# Patient Record
Sex: FEMALE | Race: OTHER | Employment: PART TIME | URBAN - METROPOLITAN AREA
[De-identification: names, ages, dates, MRNs, and addresses within clinical notes are randomized per-mention and may not be internally consistent; named-entity substitution may affect disease eponyms.]

---

## 2017-03-10 ENCOUNTER — HOSPITAL ENCOUNTER (OUTPATIENT)
Age: 27
Discharge: HOME | End: 2017-03-10
Attending: EMERGENCY MEDICINE
Payer: COMMERCIAL

## 2017-03-10 VITALS
BODY MASS INDEX: 28.32 KG/M2 | OXYGEN SATURATION: 99 % | RESPIRATION RATE: 20 BRPM | HEIGHT: 61 IN | TEMPERATURE: 98 F | SYSTOLIC BLOOD PRESSURE: 115 MMHG | DIASTOLIC BLOOD PRESSURE: 76 MMHG | WEIGHT: 150 LBS | HEART RATE: 78 BPM

## 2017-03-10 DIAGNOSIS — J06.9 VIRAL URI: Primary | ICD-10-CM

## 2017-03-10 PROCEDURE — 99204 OFFICE O/P NEW MOD 45 MIN: CPT

## 2017-03-10 PROCEDURE — 99213 OFFICE O/P EST LOW 20 MIN: CPT

## 2017-03-10 RX ORDER — METHYLPREDNISOLONE 4 MG/1
TABLET ORAL
Qty: 1 PACKAGE | Refills: 0 | Status: SHIPPED | OUTPATIENT
Start: 2017-03-10 | End: 2017-08-21 | Stop reason: ALTCHOICE

## 2017-03-10 RX ORDER — BENZONATATE 200 MG/1
200 CAPSULE ORAL 3 TIMES DAILY PRN
Qty: 30 CAPSULE | Refills: 0 | Status: SHIPPED | OUTPATIENT
Start: 2017-03-10 | End: 2017-04-09

## 2017-03-10 RX ORDER — ALBUTEROL SULFATE 90 UG/1
2 AEROSOL, METERED RESPIRATORY (INHALATION) EVERY 4 HOURS PRN
Qty: 1 INHALER | Refills: 0 | Status: SHIPPED | OUTPATIENT
Start: 2017-03-10 | End: 2017-04-09

## 2017-03-10 RX ORDER — HYDROCODONE POLISTIREX AND CHLORPHENIRAMINE POLISTIREX 10; 8 MG/5ML; MG/5ML
5 SUSPENSION, EXTENDED RELEASE ORAL 2 TIMES DAILY PRN
Qty: 115 ML | Refills: 0 | Status: SHIPPED | OUTPATIENT
Start: 2017-03-10 | End: 2017-08-21 | Stop reason: ALTCHOICE

## 2017-03-11 NOTE — ED PROVIDER NOTES
Patient presents with:  Cough/URI    HPI:     Reyna Zhong is a 31year old female who presents with chief complaint of cough. Started 2-3 days ago with a scratchy throat. No sore throat. Has progressed to congestion and a cough. No CP or SOB.   Has PCP in 3-5 days for re-evaluation, sooner if symptoms worsen (fever, CP or SOB)      All results reviewed and discussed with patient. See AVS for detailed discharge instructions.

## 2017-03-11 NOTE — ED INITIAL ASSESSMENT (HPI)
Started w scratchy throat, Coughed up brown secretions once this AM.  Minimal relief w mucimex.   Chills on & off.

## 2017-08-17 ENCOUNTER — TELEPHONE (OUTPATIENT)
Dept: FAMILY MEDICINE CLINIC | Facility: CLINIC | Age: 27
End: 2017-08-17

## 2017-08-17 DIAGNOSIS — Z00.00 ANNUAL PHYSICAL EXAM: Primary | ICD-10-CM

## 2017-08-17 NOTE — TELEPHONE ENCOUNTER
Pt called she has a px with Dr Gonzalez Headley on Monday and would like to get her labs drawn before then. 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

## 2017-08-18 ENCOUNTER — LAB ENCOUNTER (OUTPATIENT)
Dept: LAB | Facility: HOSPITAL | Age: 27
End: 2017-08-18
Attending: FAMILY MEDICINE
Payer: COMMERCIAL

## 2017-08-18 DIAGNOSIS — Z00.00 ANNUAL PHYSICAL EXAM: ICD-10-CM

## 2017-08-18 LAB
25-HYDROXYVITAMIN D (TOTAL): 30.8 NG/ML (ref 30–100)
ALBUMIN SERPL-MCNC: 3.4 G/DL (ref 3.5–4.8)
ALP LIVER SERPL-CCNC: 84 U/L (ref 37–98)
ALT SERPL-CCNC: 23 U/L (ref 14–54)
AST SERPL-CCNC: 12 U/L (ref 15–41)
BASOPHILS # BLD AUTO: 0.05 X10(3) UL (ref 0–0.1)
BASOPHILS NFR BLD AUTO: 0.6 %
BILIRUB SERPL-MCNC: 0.4 MG/DL (ref 0.1–2)
BUN BLD-MCNC: 13 MG/DL (ref 8–20)
CALCIUM BLD-MCNC: 8.7 MG/DL (ref 8.3–10.3)
CHLORIDE: 107 MMOL/L (ref 101–111)
CHOLEST SMN-MCNC: 151 MG/DL (ref ?–200)
CO2: 25 MMOL/L (ref 22–32)
CREAT BLD-MCNC: 0.63 MG/DL (ref 0.55–1.02)
EOSINOPHIL # BLD AUTO: 0.21 X10(3) UL (ref 0–0.3)
EOSINOPHIL NFR BLD AUTO: 2.7 %
ERYTHROCYTE [DISTWIDTH] IN BLOOD BY AUTOMATED COUNT: 13.5 % (ref 11.5–16)
GLUCOSE BLD-MCNC: 87 MG/DL (ref 70–99)
HCT VFR BLD AUTO: 37.2 % (ref 34–50)
HDLC SERPL-MCNC: 50 MG/DL (ref 45–?)
HDLC SERPL: 3.02 {RATIO} (ref ?–4.44)
HGB BLD-MCNC: 12.2 G/DL (ref 12–16)
IMMATURE GRANULOCYTE COUNT: 0.01 X10(3) UL (ref 0–1)
IMMATURE GRANULOCYTE RATIO %: 0.1 %
LDLC SERPL CALC-MCNC: 83 MG/DL (ref ?–130)
LDLC SERPL-MCNC: 18 MG/DL (ref 5–40)
LYMPHOCYTES # BLD AUTO: 2.59 X10(3) UL (ref 0.9–4)
LYMPHOCYTES NFR BLD AUTO: 33.5 %
M PROTEIN MFR SERPL ELPH: 7 G/DL (ref 6.1–8.3)
MCH RBC QN AUTO: 27.5 PG (ref 27–33.2)
MCHC RBC AUTO-ENTMCNC: 32.8 G/DL (ref 31–37)
MCV RBC AUTO: 84 FL (ref 81–100)
MONOCYTES # BLD AUTO: 0.58 X10(3) UL (ref 0.1–0.6)
MONOCYTES NFR BLD AUTO: 7.5 %
NEUTROPHIL ABS PRELIM: 4.3 X10 (3) UL (ref 1.3–6.7)
NEUTROPHILS # BLD AUTO: 4.3 X10(3) UL (ref 1.3–6.7)
NEUTROPHILS NFR BLD AUTO: 55.6 %
NONHDLC SERPL-MCNC: 101 MG/DL (ref ?–130)
PLATELET # BLD AUTO: 240 10(3)UL (ref 150–450)
POTASSIUM SERPL-SCNC: 4.1 MMOL/L (ref 3.6–5.1)
RBC # BLD AUTO: 4.43 X10(6)UL (ref 3.8–5.1)
RED CELL DISTRIBUTION WIDTH-SD: 41.8 FL (ref 35.1–46.3)
SODIUM SERPL-SCNC: 140 MMOL/L (ref 136–144)
TRIGLYCERIDES: 88 MG/DL (ref ?–150)
TSI SER-ACNC: 1.23 MIU/ML (ref 0.35–5.5)
WBC # BLD AUTO: 7.7 X10(3) UL (ref 4–13)

## 2017-08-18 PROCEDURE — 80061 LIPID PANEL: CPT

## 2017-08-18 PROCEDURE — 84443 ASSAY THYROID STIM HORMONE: CPT

## 2017-08-18 PROCEDURE — 82306 VITAMIN D 25 HYDROXY: CPT

## 2017-08-18 PROCEDURE — 85025 COMPLETE CBC W/AUTO DIFF WBC: CPT

## 2017-08-18 PROCEDURE — 80053 COMPREHEN METABOLIC PANEL: CPT

## 2017-08-21 ENCOUNTER — OFFICE VISIT (OUTPATIENT)
Dept: FAMILY MEDICINE CLINIC | Facility: CLINIC | Age: 27
End: 2017-08-21

## 2017-08-21 VITALS
HEIGHT: 62 IN | TEMPERATURE: 99 F | WEIGHT: 173.19 LBS | DIASTOLIC BLOOD PRESSURE: 74 MMHG | RESPIRATION RATE: 14 BRPM | OXYGEN SATURATION: 98 % | SYSTOLIC BLOOD PRESSURE: 116 MMHG | HEART RATE: 96 BPM | BODY MASS INDEX: 31.87 KG/M2

## 2017-08-21 DIAGNOSIS — Z23 NEED FOR TDAP VACCINATION: ICD-10-CM

## 2017-08-21 DIAGNOSIS — Z00.00 ANNUAL PHYSICAL EXAM: Primary | ICD-10-CM

## 2017-08-21 PROCEDURE — 99395 PREV VISIT EST AGE 18-39: CPT | Performed by: FAMILY MEDICINE

## 2017-08-21 PROCEDURE — 90471 IMMUNIZATION ADMIN: CPT | Performed by: FAMILY MEDICINE

## 2017-08-21 PROCEDURE — 90715 TDAP VACCINE 7 YRS/> IM: CPT | Performed by: FAMILY MEDICINE

## 2017-08-21 NOTE — PROGRESS NOTES
HPI:   Pamela Bueno is a 28year old female who presents for a complete physical exam.   UTD on pap. Has gyne. Dr. Martinez Renee. No concerns today.       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 Children: no.   Exercise: has personal training, cardio and strenght   Diet: low carb.  Less sugars     REVIEW OF SYSTEMS:   GENERAL: feels well otherwise  SKIN: denies the plan. The patient is asked to return for CPX in 1 year.

## 2017-08-30 ENCOUNTER — OFFICE VISIT (OUTPATIENT)
Dept: OBGYN CLINIC | Facility: CLINIC | Age: 27
End: 2017-08-30

## 2017-08-30 VITALS — WEIGHT: 174 LBS | DIASTOLIC BLOOD PRESSURE: 68 MMHG | SYSTOLIC BLOOD PRESSURE: 94 MMHG | BODY MASS INDEX: 32 KG/M2

## 2017-08-30 DIAGNOSIS — Z30.011 ENCOUNTER FOR INITIAL PRESCRIPTION OF CONTRACEPTIVE PILLS: Primary | ICD-10-CM

## 2017-08-30 DIAGNOSIS — Z01.419 WELL WOMAN EXAM WITH ROUTINE GYNECOLOGICAL EXAM: ICD-10-CM

## 2017-08-30 DIAGNOSIS — Z12.4 ENCOUNTER FOR PAPANICOLAOU SMEAR FOR CERVICAL CANCER SCREENING: ICD-10-CM

## 2017-08-30 DIAGNOSIS — Z30.432 ENCOUNTER FOR IUD REMOVAL: ICD-10-CM

## 2017-08-30 PROCEDURE — 88175 CYTOPATH C/V AUTO FLUID REDO: CPT | Performed by: OBSTETRICS & GYNECOLOGY

## 2017-08-30 PROCEDURE — 99214 OFFICE O/P EST MOD 30 MIN: CPT | Performed by: OBSTETRICS & GYNECOLOGY

## 2017-08-30 RX ORDER — NORETHINDRONE ACETATE AND ETHINYL ESTRADIOL 1MG-20(21)
1 KIT ORAL DAILY
Qty: 3 PACKAGE | Refills: 3 | Status: SHIPPED | OUTPATIENT
Start: 2017-08-30 | End: 2017-11-28

## 2017-08-30 NOTE — PROGRESS NOTES
[facility]  Obstetrics and Gynecology  History & Physical    CC: Patient is here for annual well woman exam     Subjective:     HPI: Isrrael Mcnamara is a 28year old [place] [facility] female here for annual well women exam and IUD removal. The patient was N/A    PSH:  Past Surgical History:  2013: COLPOSCOPY, CERVIX W/UPPER ADJACENT VAGINA; W/*  2007: OTHER SURGICAL HISTORY      Comment: LEFT BREAST CYST REMOVED  2013: OTHER SURGICAL HISTORY      Comment: Lasik    Social History:    Social History  Social H skin changes, pain, lumps or discharge   Respiratory:  denies SOB, dyspnea, cough or wheezing  Cardiovascular:  denies chest pain, palpitations  GI: denies abdominal pain, diarrhea, constipation  :  denies dysuria, hematuria, increased urinary frequency. to IUD   Health maintenance  - encouraged to maintain weight at healthy BMI  - discussed importance of exercise and healthy eating    All of the findings and plan were discussed with the patient. She notes understanding and agrees with the plan of care.

## 2017-08-30 NOTE — PROCEDURES
Procedure Note: IUD removal     Preoperative Diagnosis:  IUD in place     Postoperative Diagnosis:  S/p IUD removal     Indications:  29 y/o [place] female with [facility] IUD in placed since 10/2015 who presents for IUD removal per patient request.     Procedu

## 2017-09-01 NOTE — PROGRESS NOTES
Please informed patient that pap smear is normal and routine screening recommended.  Repeat pap smear in 3 years and return in 1 year for an annual well woman exam.

## 2017-10-31 ENCOUNTER — HOSPITAL ENCOUNTER (OUTPATIENT)
Age: 27
Discharge: HOME | End: 2017-10-31
Attending: FAMILY MEDICINE
Payer: COMMERCIAL

## 2017-10-31 VITALS
DIASTOLIC BLOOD PRESSURE: 74 MMHG | TEMPERATURE: 98 F | HEART RATE: 71 BPM | WEIGHT: 173.94 LBS | RESPIRATION RATE: 16 BRPM | OXYGEN SATURATION: 100 % | SYSTOLIC BLOOD PRESSURE: 125 MMHG | BODY MASS INDEX: 32 KG/M2

## 2017-10-31 DIAGNOSIS — H65.02 ACUTE SEROUS OTITIS MEDIA OF LEFT EAR, RECURRENCE NOT SPECIFIED: Primary | ICD-10-CM

## 2017-10-31 PROCEDURE — 99214 OFFICE O/P EST MOD 30 MIN: CPT

## 2017-10-31 PROCEDURE — 99213 OFFICE O/P EST LOW 20 MIN: CPT

## 2017-10-31 RX ORDER — AMOXICILLIN 875 MG/1
875 TABLET, COATED ORAL EVERY 12 HOURS
Qty: 20 TABLET | Refills: 0 | Status: SHIPPED | OUTPATIENT
Start: 2017-10-31 | End: 2017-11-10

## 2017-10-31 NOTE — ED INITIAL ASSESSMENT (HPI)
Recent air flight felt left ear getting plugged up, no going to r no relief w OTC swimmer ear drops.

## 2017-11-01 NOTE — ED PROVIDER NOTES
Patient Seen in: [facility]    History   Patient presents with:  Ear Problem Pain (neurosensory): left ear plugged    Stated Complaint: Both ear problem x 3 days    HPI    24-year-old female presents for both ears plugged. agreed except as otherwise stated in HPI. Physical Exam   ED Triage Vitals [10/31/17 2714]  BP: 125/74  Pulse: 71  Resp: 16  Temp: 98.4 °F (36.9 °C)  Temp src: Temporal  SpO2: 100 %  O2 Device: None (Room air)    Current:/74   Pulse 71   Temp 98. 4 (primary encounter diagnosis)    Disposition:  Discharge    Follow-up:  Hola Polanco, [facility] 25     In 1 week  If symptoms worsen      Medications Prescribed:  Discharge Medication List as of 10/31/2017  6:09 PM

## 2017-12-12 ENCOUNTER — OFFICE VISIT (OUTPATIENT)
Dept: FAMILY MEDICINE CLINIC | Facility: CLINIC | Age: 27
End: 2017-12-12

## 2017-12-12 VITALS
OXYGEN SATURATION: 98 % | TEMPERATURE: 98 F | RESPIRATION RATE: 16 BRPM | HEART RATE: 83 BPM | SYSTOLIC BLOOD PRESSURE: 102 MMHG | DIASTOLIC BLOOD PRESSURE: 60 MMHG

## 2017-12-12 DIAGNOSIS — J01.10 ACUTE NON-RECURRENT FRONTAL SINUSITIS: Primary | ICD-10-CM

## 2017-12-12 DIAGNOSIS — R51.9 ACUTE NONINTRACTABLE HEADACHE, UNSPECIFIED HEADACHE TYPE: ICD-10-CM

## 2017-12-12 DIAGNOSIS — R05.9 COUGH: ICD-10-CM

## 2017-12-12 PROCEDURE — 99213 OFFICE O/P EST LOW 20 MIN: CPT | Performed by: PHYSICIAN ASSISTANT

## 2017-12-12 RX ORDER — FLUTICASONE PROPIONATE 50 MCG
2 SPRAY, SUSPENSION (ML) NASAL DAILY
Qty: 3 BOTTLE | Refills: 3 | Status: SHIPPED | OUTPATIENT
Start: 2017-12-12 | End: 2018-07-23 | Stop reason: ALTCHOICE

## 2017-12-12 RX ORDER — AMOXICILLIN AND CLAVULANATE POTASSIUM 875; 125 MG/1; MG/1
1 TABLET, FILM COATED ORAL 2 TIMES DAILY
Qty: 20 TABLET | Refills: 0 | Status: SHIPPED | OUTPATIENT
Start: 2017-12-12 | End: 2018-05-12 | Stop reason: ALTCHOICE

## 2017-12-12 RX ORDER — BENZONATATE 200 MG/1
200 CAPSULE ORAL 3 TIMES DAILY PRN
Qty: 30 CAPSULE | Refills: 0 | Status: SHIPPED | OUTPATIENT
Start: 2017-12-12 | End: 2018-05-12 | Stop reason: ALTCHOICE

## 2017-12-12 RX ORDER — ALBUTEROL SULFATE 90 UG/1
AEROSOL, METERED RESPIRATORY (INHALATION) EVERY 6 HOURS PRN
COMMUNITY
End: 2018-07-23 | Stop reason: ALTCHOICE

## 2017-12-12 NOTE — PROGRESS NOTES
CHIEF COMPLAINT:   Patient presents with:  URI: cough, congestion for past week and now getting worse.           HPI:   Lawerance Ryan is a 28year old female who presents for cough and congestion for past  Week but in the past few days cough has gotten wo 102/60   Pulse 83   Temp 97.9 °F (36.6 °C) (Oral)   Resp 16   LMP 11/28/2017   SpO2 98%   GENERAL: well developed, well nourished,in no apparent distress  SKIN: no rashes, no suspicious lesions  EYES: Conjunctiva clear. No scleral icterus.   HENT: Atraumat

## 2018-05-12 ENCOUNTER — OFFICE VISIT (OUTPATIENT)
Dept: FAMILY MEDICINE CLINIC | Facility: CLINIC | Age: 28
End: 2018-05-12

## 2018-05-12 VITALS
DIASTOLIC BLOOD PRESSURE: 62 MMHG | OXYGEN SATURATION: 97 % | HEIGHT: 63 IN | TEMPERATURE: 99 F | SYSTOLIC BLOOD PRESSURE: 102 MMHG | BODY MASS INDEX: 29.23 KG/M2 | RESPIRATION RATE: 16 BRPM | WEIGHT: 165 LBS | HEART RATE: 74 BPM

## 2018-05-12 DIAGNOSIS — Z00.00 ANNUAL PHYSICAL EXAM: Primary | ICD-10-CM

## 2018-05-12 DIAGNOSIS — N92.3 SPOTTING BETWEEN MENSES: ICD-10-CM

## 2018-05-12 DIAGNOSIS — Z30.41 ENCOUNTER FOR SURVEILLANCE OF CONTRACEPTIVE PILLS: ICD-10-CM

## 2018-05-12 DIAGNOSIS — Z11.3 SCREENING FOR STD (SEXUALLY TRANSMITTED DISEASE): ICD-10-CM

## 2018-05-12 PROCEDURE — 81025 URINE PREGNANCY TEST: CPT | Performed by: FAMILY MEDICINE

## 2018-05-12 PROCEDURE — 99395 PREV VISIT EST AGE 18-39: CPT | Performed by: FAMILY MEDICINE

## 2018-05-12 PROCEDURE — 87491 CHLMYD TRACH DNA AMP PROBE: CPT | Performed by: FAMILY MEDICINE

## 2018-05-12 PROCEDURE — 87591 N.GONORRHOEAE DNA AMP PROB: CPT | Performed by: FAMILY MEDICINE

## 2018-05-12 PROCEDURE — 87389 HIV-1 AG W/HIV-1&-2 AB AG IA: CPT | Performed by: FAMILY MEDICINE

## 2018-05-12 PROCEDURE — 36415 COLL VENOUS BLD VENIPUNCTURE: CPT | Performed by: FAMILY MEDICINE

## 2018-05-12 PROCEDURE — 80053 COMPREHEN METABOLIC PANEL: CPT | Performed by: FAMILY MEDICINE

## 2018-05-12 PROCEDURE — 85025 COMPLETE CBC W/AUTO DIFF WBC: CPT | Performed by: FAMILY MEDICINE

## 2018-05-12 RX ORDER — NORETHINDRONE ACETATE AND ETHINYL ESTRADIOL AND FERROUS FUMARATE 1MG-20(21)
KIT ORAL
COMMUNITY
Start: 2018-04-13 | End: 2018-05-12

## 2018-05-12 RX ORDER — NORETHINDRONE ACETATE AND ETHINYL ESTRADIOL AND FERROUS FUMARATE 1MG-20(21)
1 KIT ORAL DAILY
Qty: 1 PACKAGE | Refills: 0 | Status: SHIPPED | OUTPATIENT
Start: 2018-05-12 | End: 2018-06-18

## 2018-05-12 NOTE — PROGRESS NOTES
HPI:   Ester Sagastume is a 28year old female who presents for a complete physical exam.   UTD on pap.     c/o Mole on breast.  Right breast.  Onset: 1 month  No change in size. Not itchy or painful. No nipple discharge.     Had IUD removed in 8/2017  Cur Comment: LEFT BREAST CYST REMOVED  2013: OTHER SURGICAL HISTORY      Comment: Cesar   Family History   Problem Relation Age of Onset   • Heart Disease Maternal Grandfather    • Diabetes Maternal Grandfather    • Lipids Father    • Hypertension Father    • discharge. 4mm dark brown mole medial to areola. Flat. Border regular. Homogenous color. No erythema or dryness. Non-tender. No axillary lymphadenopathy.   LUNGS: clear to auscultation  CARDIO: RRR without murmur  GI: soft, good BS's,no masses, HSM or tend

## 2018-05-14 ENCOUNTER — TELEPHONE (OUTPATIENT)
Dept: FAMILY MEDICINE CLINIC | Facility: CLINIC | Age: 28
End: 2018-05-14

## 2018-05-14 RX ORDER — BUTALBITAL, ACETAMINOPHEN AND CAFFEINE 300; 40; 50 MG/1; MG/1; MG/1
1 CAPSULE ORAL DAILY PRN
Qty: 10 CAPSULE | Refills: 0 | Status: SHIPPED | OUTPATIENT
Start: 2018-05-14 | End: 2019-03-09 | Stop reason: ALTCHOICE

## 2018-06-09 ENCOUNTER — OFFICE VISIT (OUTPATIENT)
Dept: FAMILY MEDICINE CLINIC | Facility: CLINIC | Age: 28
End: 2018-06-09

## 2018-06-09 ENCOUNTER — TELEPHONE (OUTPATIENT)
Dept: FAMILY MEDICINE CLINIC | Facility: CLINIC | Age: 28
End: 2018-06-09

## 2018-06-09 VITALS
TEMPERATURE: 98 F | SYSTOLIC BLOOD PRESSURE: 102 MMHG | RESPIRATION RATE: 16 BRPM | DIASTOLIC BLOOD PRESSURE: 70 MMHG | HEART RATE: 68 BPM

## 2018-06-09 DIAGNOSIS — N76.0 ACUTE VAGINITIS: ICD-10-CM

## 2018-06-09 DIAGNOSIS — N30.01 ACUTE CYSTITIS WITH HEMATURIA: Primary | ICD-10-CM

## 2018-06-09 PROCEDURE — 99214 OFFICE O/P EST MOD 30 MIN: CPT | Performed by: NURSE PRACTITIONER

## 2018-06-09 PROCEDURE — 81003 URINALYSIS AUTO W/O SCOPE: CPT | Performed by: NURSE PRACTITIONER

## 2018-06-09 RX ORDER — FLUCONAZOLE 150 MG/1
TABLET ORAL
Qty: 2 TABLET | Refills: 0 | Status: SHIPPED | OUTPATIENT
Start: 2018-06-09 | End: 2018-07-23 | Stop reason: ALTCHOICE

## 2018-06-09 RX ORDER — NITROFURANTOIN 25; 75 MG/1; MG/1
CAPSULE ORAL
Qty: 14 CAPSULE | Refills: 0 | Status: SHIPPED | OUTPATIENT
Start: 2018-06-09 | End: 2018-07-23 | Stop reason: ALTCHOICE

## 2018-06-09 RX ORDER — NITROFURANTOIN 25; 75 MG/1; MG/1
CAPSULE ORAL
Qty: 14 CAPSULE | Refills: 0 | Status: SHIPPED | OUTPATIENT
Start: 2018-06-09 | End: 2018-06-09 | Stop reason: CLARIF

## 2018-06-09 RX ORDER — FLUCONAZOLE 150 MG/1
TABLET ORAL
Qty: 2 TABLET | Refills: 0 | Status: SHIPPED | OUTPATIENT
Start: 2018-06-09 | End: 2018-06-09

## 2018-06-09 NOTE — TELEPHONE ENCOUNTER
Patient states that she is having urine frequency and pain with urination on Wednesday. Patient has a friend that called in [address] for her. She has been taking Cipro since Wednesday.  Patient states Urinary symptoms are better but now she has vaginal dischar

## 2018-06-09 NOTE — PROGRESS NOTES
CHIEF COMPLAINT:   Patient presents with:  UTI    HPI:   Andi Prado is a 28year old female who presents with symptoms of UTI. Patient states that she developed urinary urgency, frequency and burning 6 days ago.   She also has a sense of incomplete Alcohol use: Yes           0.0 oz/week     Comment: ONCE EVERY 2-3 MONTHS        REVIEW OF SYSTEMS:   GENERAL: See above  SKIN: no rashes, no skin wounds or ulcers. GI: See HPI. : See HPI. NEURO: no headaches.     EXAM:   /70   Pulse 68   Temp - fluconazole (DIFLUCAN) 150 MG Oral Tab; Take 1 tablet po today. May repeat in 3 days if needed. Dispense: 2 tablet; Refill: 0  - CHLAMYDIA/GONOCOCCUS, JANINA  - Pt sexually active with male partner.   Pt requests GC/Chlamydia test.    Advised patient to fo

## 2018-06-10 ENCOUNTER — TELEPHONE (OUTPATIENT)
Dept: FAMILY MEDICINE CLINIC | Facility: CLINIC | Age: 28
End: 2018-06-10

## 2018-06-10 NOTE — TELEPHONE ENCOUNTER
Pt reports she was seen at the [facility] yesterday for UTI. States her partner is now having symptoms and is wondering if he needs to be treated. Advised results of gonorrhea and chlamydia culture are still pending.  Advised pt we will call her with those results

## 2018-06-11 ENCOUNTER — TELEPHONE (OUTPATIENT)
Dept: FAMILY MEDICINE CLINIC | Facility: CLINIC | Age: 28
End: 2018-06-11

## 2018-06-11 NOTE — TELEPHONE ENCOUNTER
Patient called back clinic. She reports her UTI symptoms are improved. From previous note, it appears patient already completed 3 days of 500mg ciprofloxacin. Given urine culture, bacteria appears to be contamination rather than true UTI.   May stop macro

## 2018-06-14 ENCOUNTER — PATIENT MESSAGE (OUTPATIENT)
Dept: FAMILY MEDICINE CLINIC | Facility: CLINIC | Age: 28
End: 2018-06-14

## 2018-06-14 NOTE — TELEPHONE ENCOUNTER
From: Emanuel Jerez  To: Janes Ngo MD  Sent: 6/14/2018 6:03 AM CDT  Subject: Test Results Question    Where can I see my test results for the STD panel? I had one with you on May 12th and just another one on June 9.

## 2018-06-15 NOTE — TELEPHONE ENCOUNTER
From: Dusty Vargas  To: Talya Patel MD  Sent: 6/14/2018 8:07 PM CDT  Subject: Test Results Question    Renae Camejo the RN stated I could have my STD panel mailed to me if we could make this happen that would be great thank you.  I’d like the most up to [employer]

## 2018-06-18 DIAGNOSIS — Z11.3 SCREENING FOR STD (SEXUALLY TRANSMITTED DISEASE): ICD-10-CM

## 2018-06-18 DIAGNOSIS — Z30.41 ENCOUNTER FOR SURVEILLANCE OF CONTRACEPTIVE PILLS: ICD-10-CM

## 2018-06-18 DIAGNOSIS — N92.3 SPOTTING BETWEEN MENSES: ICD-10-CM

## 2018-06-18 DIAGNOSIS — Z00.00 ANNUAL PHYSICAL EXAM: ICD-10-CM

## 2018-06-18 NOTE — TELEPHONE ENCOUNTER
LOV: 5/12/18 for annual physical  Last pap: 8/30/17      JUNEL FE 1/20 1-20 MG-MCG Oral Tab 1 Package 0 5/12/2018    Sig :  Take 1 tablet by mouth daily. Route:   Oral     ALLEN:   Yes       No future appointments. Please advise.

## 2018-06-18 NOTE — TELEPHONE ENCOUNTER
From: Marilia Moore  Sent: 7/08/0689 12:05 PM CDT  Subject: Medication Renewal Request    Marilia Moore would like a refill of the following medications:     Teodoro Kwon FE 1/20 1-20 MG-MCG Oral Tab Khurram Pena MD]    [facility]erred pharmacy: [facility]

## 2018-06-20 RX ORDER — NORETHINDRONE ACETATE AND ETHINYL ESTRADIOL AND FERROUS FUMARATE 1MG-20(21)
1 KIT ORAL DAILY
Qty: 1 PACKAGE | Refills: 5 | Status: SHIPPED
Start: 2018-06-20 | End: 2018-06-20

## 2018-06-20 RX ORDER — NORETHINDRONE ACETATE AND ETHINYL ESTRADIOL AND FERROUS FUMARATE 1MG-20(21)
1 KIT ORAL DAILY
Qty: 1 PACKAGE | Refills: 5 | Status: SHIPPED | OUTPATIENT
Start: 2018-06-20 | End: 2018-12-08

## 2018-06-20 NOTE — TELEPHONE ENCOUNTER
Pt stated the rx needs to be rewritten to take 3 months consecutively and then stop for 1 week and then start it again. Pt stated the Ins is giving her a hard time because they telling her she is picking it up to early.

## 2018-07-11 ENCOUNTER — TELEPHONE (OUTPATIENT)
Dept: FAMILY MEDICINE CLINIC | Facility: CLINIC | Age: 28
End: 2018-07-11

## 2018-07-11 NOTE — TELEPHONE ENCOUNTER
Spoke with pt,  Pt states she had yeast infection in June and she went to [facility] for this. Pt states now she is having like bloody brownish discharge x 2 days. Pt states is a little itching and burning when she whip her self bot no odor or pain.   Pt states s

## 2018-07-11 NOTE — TELEPHONE ENCOUNTER
Patient advised and verbalized understanding. Patient is going to try the monistat cream on outer vaginal area  Patient states the irritation is not inside it is on the outside. Patient will call with an update if symptoms do no resolve.

## 2018-07-11 NOTE — TELEPHONE ENCOUNTER
I can see her on Friday (noon) and test for yeast/bacterial infection. Sounds like the discharge may also be remnant from her period. If she can't come in on Friday, she can try OTC monistat and monitor for few more days, then call with update next week.

## 2018-07-11 NOTE — TELEPHONE ENCOUNTER
Pt called stated she had a yeast infection in June and was seen in the [address] [place] [address] for this. Now it has been 2 days since her period finished and she does use pads. Pt stated now she is getting dark discharge and it's more coming out of the sides.  She stated she is

## 2018-07-20 ENCOUNTER — TELEPHONE (OUTPATIENT)
Dept: INTERNAL MEDICINE CLINIC | Facility: CLINIC | Age: 28
End: 2018-07-20

## 2018-07-20 NOTE — TELEPHONE ENCOUNTER
Following up on yeast infection symptoms. Symptoms went away and have now returned. Looking for Monday night appt at 6:30 which is the only time she can come in. Not in any medication right now.   IC put her on antiobiotic in June but was told it was not

## 2018-07-20 NOTE — TELEPHONE ENCOUNTER
Patient advised and verbalized understanding. Appointment scheduled.   Future Appointments  Date Time Provider Best Laura   7/23/2018 6:20 PM Martha Myrick MD [facility]

## 2018-07-20 NOTE — TELEPHONE ENCOUNTER
She can be squeezed in at 6:30pm on Monday but please make sure pt understands it will be focused visit for vaginitis panel only.

## 2018-07-23 ENCOUNTER — OFFICE VISIT (OUTPATIENT)
Dept: FAMILY MEDICINE CLINIC | Facility: CLINIC | Age: 28
End: 2018-07-23
Payer: COMMERCIAL

## 2018-07-23 VITALS
RESPIRATION RATE: 16 BRPM | BODY MASS INDEX: 29 KG/M2 | WEIGHT: 166 LBS | HEART RATE: 80 BPM | TEMPERATURE: 99 F | SYSTOLIC BLOOD PRESSURE: 100 MMHG | DIASTOLIC BLOOD PRESSURE: 76 MMHG

## 2018-07-23 DIAGNOSIS — N89.8 VAGINAL DISCHARGE: Primary | ICD-10-CM

## 2018-07-23 PROCEDURE — 87480 CANDIDA DNA DIR PROBE: CPT | Performed by: FAMILY MEDICINE

## 2018-07-23 PROCEDURE — 87660 TRICHOMONAS VAGIN DIR PROBE: CPT | Performed by: FAMILY MEDICINE

## 2018-07-23 PROCEDURE — 99213 OFFICE O/P EST LOW 20 MIN: CPT | Performed by: FAMILY MEDICINE

## 2018-07-23 PROCEDURE — 87510 GARDNER VAG DNA DIR PROBE: CPT | Performed by: FAMILY MEDICINE

## 2018-07-23 NOTE — PROGRESS NOTES
HPI:    Patient ID: Micheal Rogers is a 33year old female. Patient presents with:  Vaginal Discharge    HPI  C/o vaginal discharge for 5 days. White , mucoid. No odor. No itching. Started after menses. Sexually active. No new partners.     Review of S Mouth/Throat: Oropharynx is clear and moist.   Cardiovascular: Normal rate. Pulmonary/Chest: Effort normal and breath sounds normal.   Abdominal: Soft. There is no tenderness. Genitourinary: Vagina normal. There is no lesion on the right labia.  Ther

## 2018-07-24 RX ORDER — FLUCONAZOLE 150 MG/1
150 TABLET ORAL ONCE
Qty: 1 TABLET | Refills: 0 | Status: SHIPPED | OUTPATIENT
Start: 2018-07-24 | End: 2018-07-24

## 2018-07-24 RX ORDER — METRONIDAZOLE 500 MG/1
500 TABLET ORAL 2 TIMES DAILY
Qty: 14 TABLET | Refills: 0 | Status: SHIPPED | OUTPATIENT
Start: 2018-07-24 | End: 2018-12-08 | Stop reason: ALTCHOICE

## 2018-12-04 ENCOUNTER — TELEPHONE (OUTPATIENT)
Dept: FAMILY MEDICINE CLINIC | Facility: CLINIC | Age: 28
End: 2018-12-04

## 2018-12-04 NOTE — TELEPHONE ENCOUNTER
Menstrual cycle started Sunday night  Patient states this menstrual cycle has been more \"clotty\"  Patient states this started last night - when patient is urinating she can feel clots coming out. Patient has been off of birth control since septJeni Keenan

## 2018-12-04 NOTE — TELEPHONE ENCOUNTER
Pt states her Menstrual Cycle is has lots of clots,  Does have an appt with Dr Chantelle Rice this Saturday,  Dr Dejan West is more familiar with her history and wants to talk to the nurse. Please call.

## 2018-12-08 ENCOUNTER — OFFICE VISIT (OUTPATIENT)
Dept: OBGYN CLINIC | Facility: CLINIC | Age: 28
End: 2018-12-08
Payer: COMMERCIAL

## 2018-12-08 VITALS
DIASTOLIC BLOOD PRESSURE: 72 MMHG | WEIGHT: 173 LBS | HEART RATE: 84 BPM | SYSTOLIC BLOOD PRESSURE: 120 MMHG | BODY MASS INDEX: 31.83 KG/M2 | HEIGHT: 62 IN

## 2018-12-08 DIAGNOSIS — N89.8 VAGINAL LEUKORRHEA: ICD-10-CM

## 2018-12-08 DIAGNOSIS — Z11.3 SCREEN FOR STD (SEXUALLY TRANSMITTED DISEASE): Primary | ICD-10-CM

## 2018-12-08 DIAGNOSIS — Z30.011 BCP (BIRTH CONTROL PILLS) INITIATION: ICD-10-CM

## 2018-12-08 PROCEDURE — 99213 OFFICE O/P EST LOW 20 MIN: CPT | Performed by: NURSE PRACTITIONER

## 2018-12-08 NOTE — PROGRESS NOTES
Gyne note     S:   Patient is a 33year old yo  here for a number of concerns. She was treated initially for a UTI with her PCP 6 months ago. She had a friend treat her with medications for a yeast and bacterial infection as well.  She was seen by he noted.                                Vagina: normal pink mucosa, no lesions, moderate white discharge.                       Cervix: nulliparous, no lesions                                  A/P:  1. Screen for STD (sexually transmitted disease)    - CHLAMY

## 2018-12-11 RX ORDER — METRONIDAZOLE 500 MG/1
500 TABLET ORAL 2 TIMES DAILY WITH MEALS
Qty: 14 TABLET | Refills: 1 | Status: SHIPPED | OUTPATIENT
Start: 2018-12-11 | End: 2018-12-18

## 2019-01-02 ENCOUNTER — TELEPHONE (OUTPATIENT)
Dept: OBGYN CLINIC | Facility: CLINIC | Age: 29
End: 2019-01-02

## 2019-01-02 RX ORDER — METRONIDAZOLE 7.5 MG/G
1 GEL VAGINAL NIGHTLY
Qty: 1 TUBE | Refills: 0 | Status: SHIPPED | OUTPATIENT
Start: 2019-01-02 | End: 2019-01-07

## 2019-01-02 NOTE — TELEPHONE ENCOUNTER
Last OV date: 12/8/18 with Edwin Birch for gyn problem      Spoke with patient. Edwin Birch had instructed her to treat her yeast and BV a second time after finishing her menses.  She just finished her menses and would like to know if something different could be ordered t

## 2019-02-28 ENCOUNTER — HOSPITAL ENCOUNTER (OUTPATIENT)
Age: 29
Discharge: HOME | End: 2019-02-28
Attending: FAMILY MEDICINE
Payer: COMMERCIAL

## 2019-02-28 VITALS
TEMPERATURE: 100 F | DIASTOLIC BLOOD PRESSURE: 68 MMHG | SYSTOLIC BLOOD PRESSURE: 110 MMHG | OXYGEN SATURATION: 100 % | RESPIRATION RATE: 16 BRPM | HEART RATE: 66 BPM

## 2019-02-28 DIAGNOSIS — G43.009 MIGRAINE WITHOUT AURA AND WITHOUT STATUS MIGRAINOSUS, NOT INTRACTABLE: Primary | ICD-10-CM

## 2019-02-28 LAB — POCT URINE PREGNANCY: NEGATIVE

## 2019-02-28 PROCEDURE — 81025 URINE PREGNANCY TEST: CPT | Performed by: FAMILY MEDICINE

## 2019-02-28 PROCEDURE — 99214 OFFICE O/P EST MOD 30 MIN: CPT

## 2019-02-28 PROCEDURE — 96372 THER/PROPH/DIAG INJ SC/IM: CPT

## 2019-02-28 RX ORDER — METHYLPREDNISOLONE 4 MG/1
TABLET ORAL
Qty: 1 PACKAGE | Refills: 0 | Status: SHIPPED | OUTPATIENT
Start: 2019-02-28 | End: 2019-03-09 | Stop reason: ALTCHOICE

## 2019-02-28 RX ORDER — KETOROLAC TROMETHAMINE 30 MG/ML
30 INJECTION, SOLUTION INTRAMUSCULAR; INTRAVENOUS ONCE
Status: COMPLETED | OUTPATIENT
Start: 2019-02-28 | End: 2019-02-28

## 2019-03-01 NOTE — ED PROVIDER NOTES
Patient Seen in: [id number]    History   Patient presents with:  Headache (neurologic)    Stated Complaint: Headache    HPI    25-year-old female with previous history of migraine headaches presents with complaint of right-sided head °C) (Temporal)   Resp 16   LMP 01/28/2019   SpO2 100%         Physical Exam    Patient is alert oriented ×3 in no acute distress   conjunctiva clear no icterus, pupils equally react to light bilaterally, extraocular movement intact  Oropharynx: No erythema

## 2019-03-01 NOTE — ED INITIAL ASSESSMENT (HPI)
Pt states hx of migraines. Took her migraine med with no relief. 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 States that worsened her migraine.  Also noticed a knot in a muscle in the right side of her neck 2 weeks ago that also

## 2019-03-09 ENCOUNTER — OFFICE VISIT (OUTPATIENT)
Dept: OBGYN CLINIC | Facility: CLINIC | Age: 29
End: 2019-03-09
Payer: COMMERCIAL

## 2019-03-09 VITALS
SYSTOLIC BLOOD PRESSURE: 116 MMHG | BODY MASS INDEX: 33.34 KG/M2 | HEIGHT: 62 IN | DIASTOLIC BLOOD PRESSURE: 68 MMHG | WEIGHT: 181.19 LBS | HEART RATE: 85 BPM

## 2019-03-09 DIAGNOSIS — Z30.41 SURVEILLANCE FOR BIRTH CONTROL, ORAL CONTRACEPTIVES: Primary | ICD-10-CM

## 2019-03-09 PROCEDURE — 99213 OFFICE O/P EST LOW 20 MIN: CPT | Performed by: NURSE PRACTITIONER

## 2019-03-09 RX ORDER — NORETHINDRONE ACETATE AND ETHINYL ESTRADIOL 1MG-20(21)
KIT ORAL
Qty: 84 TABLET | Refills: 3 | Status: SHIPPED | OUTPATIENT
Start: 2019-03-09 | End: 2019-06-18

## 2019-03-09 RX ORDER — NORETHINDRONE ACETATE AND ETHINYL ESTRADIOL 1MG-20(21)
1 KIT ORAL
COMMUNITY
Start: 2018-05-12 | End: 2019-03-09

## 2019-03-09 RX ORDER — ONDANSETRON 4 MG/1
4 TABLET, ORALLY DISINTEGRATING ORAL EVERY 8 HOURS PRN
COMMUNITY

## 2019-03-09 RX ORDER — SUMATRIPTAN 25 MG/1
25 TABLET, FILM COATED ORAL EVERY 2 HOUR PRN
COMMUNITY

## 2019-03-09 NOTE — PROGRESS NOTES
S:  Patient was here to follow up after starting oral contraception. She is happy, has not noticed any side effects. She notes she has had breakthrough bleeding. She has not been compliant with her pill taking so she has experienced breakthrough bleeding.

## 2019-05-03 ENCOUNTER — TELEPHONE (OUTPATIENT)
Dept: FAMILY MEDICINE CLINIC | Facility: CLINIC | Age: 29
End: 2019-05-03

## 2019-05-03 NOTE — TELEPHONE ENCOUNTER
Spoke to patient regarding FMLA paperwork, patient states that she has appt with Dr. Altagracia Godinez 05/11/2019. Her employer states she needs FMLA paperwork done for her migraines.

## 2019-05-03 NOTE — TELEPHONE ENCOUNTER
Received [facility] paperwork for patient from [facility]  Called to check what paperwork was regarding. Patient has not been seen since 7/23/18.

## 2019-05-04 NOTE — TELEPHONE ENCOUNTER
Has ainsley  Future Appointments   Date Time Provider Best Laura   5/11/2019 10:30 AM Roberto Carlos George MD [facility] [place]   12/14/2019 11:30 AM ARELI Montano [facility]

## 2019-05-11 ENCOUNTER — OFFICE VISIT (OUTPATIENT)
Dept: FAMILY MEDICINE CLINIC | Facility: CLINIC | Age: 29
End: 2019-05-11
Payer: COMMERCIAL

## 2019-05-11 ENCOUNTER — TELEPHONE (OUTPATIENT)
Dept: FAMILY MEDICINE CLINIC | Facility: CLINIC | Age: 29
End: 2019-05-11

## 2019-05-11 VITALS
WEIGHT: 183 LBS | SYSTOLIC BLOOD PRESSURE: 118 MMHG | BODY MASS INDEX: 33.68 KG/M2 | HEART RATE: 86 BPM | DIASTOLIC BLOOD PRESSURE: 78 MMHG | RESPIRATION RATE: 18 BRPM | TEMPERATURE: 98 F | HEIGHT: 62 IN

## 2019-05-11 DIAGNOSIS — G43.909 MIGRAINE WITHOUT STATUS MIGRAINOSUS, NOT INTRACTABLE, UNSPECIFIED MIGRAINE TYPE: Primary | ICD-10-CM

## 2019-05-11 PROCEDURE — 99213 OFFICE O/P EST LOW 20 MIN: CPT | Performed by: FAMILY MEDICINE

## 2019-05-11 NOTE — TELEPHONE ENCOUNTER
Pt signed a release of records at her OV today. I faxed the request to the [address] [place] [address] at [phone].

## 2019-05-11 NOTE — PROGRESS NOTES
Patient presents with:  Complete Form: [employer] FMLA forms       HPI:    Patient ID: Aubrie Argueta is a 33year old female. Requesting FMLA for migraine headaches. Here to discuss migraines. Asymptomatic currently. No other concerns today.     Frequency: Grandmother    • Hypertension Maternal Grandmother    • Cancer Maternal Grandmother 72        colon cancer   • Diabetes Paternal Grandmother    • Diabetes Paternal Grandfather    • Stroke Neg       Social History: Social History    Tobacco Use      Smoking

## 2019-05-11 NOTE — TELEPHONE ENCOUNTER
Pt saw Neurologist Dr Kendrick Mendes on June 4th 2018  [facility]    Also Pt saw Sachin Munroe on July 10, 2018  [facility]. [phone]

## 2019-05-23 ENCOUNTER — TELEPHONE (OUTPATIENT)
Dept: FAMILY MEDICINE CLINIC | Facility: CLINIC | Age: 29
End: 2019-05-23

## 2019-05-23 NOTE — TELEPHONE ENCOUNTER
Patient advised still waiting on neuro consult for paperwork to be completed.   Patient states she will call and get notes from neurologist.

## 2019-06-18 ENCOUNTER — TELEPHONE (OUTPATIENT)
Dept: OBGYN CLINIC | Facility: CLINIC | Age: 29
End: 2019-06-18

## 2019-06-18 DIAGNOSIS — Z30.011 BCP (BIRTH CONTROL PILLS) INITIATION: ICD-10-CM

## 2019-06-18 NOTE — TELEPHONE ENCOUNTER
rx sent for Lo Loestrin Fe 90-day supply with one refill to get pt to next annual.    Contacted patient and notified her that rx has been sent.

## 2019-06-18 NOTE — TELEPHONE ENCOUNTER
33year old patient complaining of feeling acosta with current pill. States that the pharmacy filled Junel Fe this last time.  She would like to go back to the one Veena Martin ordered in December- Lo Loestrin Fe  Last OV date: 3/9/19 with Veena Martin for medication f/u; ne

## 2019-06-18 NOTE — TELEPHONE ENCOUNTER
Patient saw Guillermina Ramirez and she put her on a new birth control medication in March. She does not like how it makes her feel and would like to be put back on the previous medication she was on before.

## 2019-10-15 DIAGNOSIS — Z30.011 BCP (BIRTH CONTROL PILLS) INITIATION: ICD-10-CM

## 2019-10-15 NOTE — TELEPHONE ENCOUNTER
Last OV: 3/9/19 with Kalyani Patient for med f/u  Last refill date: 6/18/19  Follow-up: 12/2019 for annual  Next appt.: 12/14/19    Refill sent.

## 2019-12-21 ENCOUNTER — OFFICE VISIT (OUTPATIENT)
Dept: FAMILY MEDICINE CLINIC | Facility: CLINIC | Age: 29
End: 2019-12-21
Payer: COMMERCIAL

## 2019-12-21 VITALS
HEART RATE: 74 BPM | RESPIRATION RATE: 16 BRPM | OXYGEN SATURATION: 99 % | DIASTOLIC BLOOD PRESSURE: 66 MMHG | BODY MASS INDEX: 34.41 KG/M2 | WEIGHT: 187 LBS | TEMPERATURE: 98 F | HEIGHT: 62 IN | SYSTOLIC BLOOD PRESSURE: 100 MMHG

## 2019-12-21 DIAGNOSIS — Z00.00 ANNUAL PHYSICAL EXAM: Primary | ICD-10-CM

## 2019-12-21 DIAGNOSIS — Z30.41 ENCOUNTER FOR SURVEILLANCE OF CONTRACEPTIVE PILLS: ICD-10-CM

## 2019-12-21 PROCEDURE — 99395 PREV VISIT EST AGE 18-39: CPT | Performed by: FAMILY MEDICINE

## 2019-12-21 NOTE — PROGRESS NOTES
HPI:   Aubrie Argueta is a 26year old female who presents for a complete physical exam.   UTD on pap. Has gyne. Needs refill on birth control. No med s/e. Hx of RAD  Uses albuterol occ with exertion  No nocturnal cough.   ACT 25    No concerns today CERVIX W/UPPER ADJACENT VAGINA; W/BIOPSY(S), CERVIX  2013   • OTHER SURGICAL HISTORY  2007    LEFT BREAST CYST REMOVED   • OTHER SURGICAL HISTORY  2013    Lasik      Family History   Problem Relation Age of Onset   • Heart Disease Maternal Grandfather    • clear  NECK: supple,no adenopathy,no bruits, no thyromegaly  BREAST: no masses. No nipple discharge. 4mm dark brown mole medial to areola. Flat. Border regular. Homogenous color. No erythema or dryness. Non-tender. No axillary lymphadenopathy.   LUNGS: rashad

## 2019-12-23 ENCOUNTER — TELEPHONE (OUTPATIENT)
Dept: FAMILY MEDICINE CLINIC | Facility: CLINIC | Age: 29
End: 2019-12-23

## 2019-12-23 NOTE — TELEPHONE ENCOUNTER
Pt notified and verbalized understanding.    Reminder placed to repeat TSH - per pt she will go to [facility].

## 2019-12-23 NOTE — TELEPHONE ENCOUNTER
----- Message from Giovanni Severe, MD sent at 12/23/2019 11:42 AM CST -----  TSH slightly now. Repeat in 4 weeks. Will refer to endo if still low. Rest of BW wnl.

## 2020-01-15 ENCOUNTER — TELEPHONE (OUTPATIENT)
Dept: FAMILY MEDICINE CLINIC | Facility: CLINIC | Age: 30
End: 2020-01-15

## 2020-01-15 DIAGNOSIS — R79.89 LOW TSH LEVEL: Primary | ICD-10-CM

## 2020-01-15 NOTE — TELEPHONE ENCOUNTER
Pt called stated she is supposed to repeat labs in a month and has not received a call about doing these again. So if she needs to get these done then she needs the orders sent to [address] [place] [address] in [facility].

## 2020-01-15 NOTE — TELEPHONE ENCOUNTER
See lab results note from 12/21/19. Repeat TSH in 4 weeks. Order placed.  Faxed to [facility]    [address] [place] [address] Fax: [phone]    LM for patient to call back

## 2020-01-19 LAB — TSH W/REFLEX TO FT4: 0.59 MIU/L

## 2020-01-20 ENCOUNTER — TELEPHONE (OUTPATIENT)
Dept: FAMILY MEDICINE CLINIC | Facility: CLINIC | Age: 30
End: 2020-01-20

## 2020-01-20 NOTE — TELEPHONE ENCOUNTER
----- Message from Emily Starks MD sent at 1/20/2020 12:20 PM CST -----  TSH now wnl. No med needed.  Recheck in 1 year

## 2020-03-09 NOTE — TELEPHONE ENCOUNTER
Gynecology Medication Protocol Passed3/9 2:03 PM   PASS-PENDING LAST PAP WNL--VIA MANUAL LOOKUP    Physical or Pelvic/Breast in past 12 or next 3 mos--VIA MANUAL LOOKUP     Last refilled on 12/21/19 for # 84 with 0 refills  Last OV 12/23/19 for physical  N

## 2020-04-15 ENCOUNTER — TELEPHONE (OUTPATIENT)
Dept: FAMILY MEDICINE CLINIC | Facility: CLINIC | Age: 30
End: 2020-04-15

## 2020-04-15 NOTE — TELEPHONE ENCOUNTER
Spoke to patient. Needs medical documentation from PCP that states she has asthma. Wants it sent to [address] [place] [address] [address].

## 2020-04-15 NOTE — TELEPHONE ENCOUNTER
Pt states she has to fill out a form online for her employer because she has asthma,  States she needs medical documentation from her PCP to go along with the form stating she has Asthma,  Please advise

## 2020-06-02 NOTE — TELEPHONE ENCOUNTER
Please advise if ok to send script for migraine.
Pt stated she had another migrane after she saw you Saturday. She is working on finding a new neurologist but in the interim is there a script you can give her?
Rx for Fioricet left for pt .
Spoke with patient.   Patient would like script faxed to [facility] in [place] [address]  Rx faxed to patient's preferred pharmacy [phone]
Patient requests all Lab and Radiology Results on their Discharge Instructions

## 2020-06-11 NOTE — TELEPHONE ENCOUNTER
Medication Quantity Refills Start End   Albuterol Sulfate 108 (90 Base) MCG/ACT Inhalation Aerosol Powder, Breath Activated 1 each 0 12/21/2019      Last OV 12/21/19 for physical   No future appointments.       Asthma & COPD Medication Protocol Failed6/11 1

## 2020-06-11 NOTE — TELEPHONE ENCOUNTER
[facility] from [employer] called in on behalf of mutual patient to request refill of :     Albuterol Sulfate 108 (90 Base) MCG/ACT Inhalation Aerosol Powder, Breath Activated    To be sent to:  [facility]  [address] Aneta Vickers

## 2020-06-17 ENCOUNTER — TELEPHONE (OUTPATIENT)
Dept: FAMILY MEDICINE CLINIC | Facility: CLINIC | Age: 30
End: 2020-06-17

## 2020-06-17 RX ORDER — ALBUTEROL SULFATE 90 UG/1
2 AEROSOL, METERED RESPIRATORY (INHALATION) EVERY 4 HOURS PRN
Qty: 1 INHALER | Refills: 0 | Status: SHIPPED | OUTPATIENT
Start: 2020-06-17

## 2020-06-17 NOTE — TELEPHONE ENCOUNTER
Pt called stated the pharmacy still has not heard anything regarding her rx for the Albuterol (Pro air) Pt stated it was written for the powder base not the aerosol. She stated she does not want the powder base.

## 2020-10-28 ENCOUNTER — TELEPHONE (OUTPATIENT)
Dept: FAMILY MEDICINE CLINIC | Facility: CLINIC | Age: 30
End: 2020-10-28

## 2020-10-28 DIAGNOSIS — Z01.89 PATIENT REQUESTED DIAGNOSTIC TESTING: Primary | ICD-10-CM

## 2020-10-28 NOTE — TELEPHONE ENCOUNTER
Pt notified.   Order faxed to [id number] in the heart [facility] building per pts request.

## 2020-10-28 NOTE — TELEPHONE ENCOUNTER
Pt states she could not get a hold of anyone at "[employer].  She is willing to pay out of pocket for this test if they do not cover it. I looked at the "[employer] website and ordered the test below that best matched what pt is looking for.   Please review, unsure wh

## 2020-10-28 NOTE — TELEPHONE ENCOUNTER
Pt notified we do not routinely do blood typing. Pt wondering her blood type due to the studies showing certain blood types are more susceptible to COVID.   She is wondering how she can go about checking her blood type - she does not want to donate blood a

## 2020-12-19 ENCOUNTER — IMMUNIZATION (OUTPATIENT)
Dept: LAB | Age: 30
End: 2020-12-19

## 2020-12-19 DIAGNOSIS — Z23 NEED FOR VACCINATION: Primary | ICD-10-CM

## 2020-12-19 PROCEDURE — 0001A COVID 19 PFIZER-BIONTECH: CPT

## 2020-12-19 PROCEDURE — 91300 COVID 19 PFIZER-BIONTECH: CPT

## 2021-01-06 ENCOUNTER — IMMUNIZATION (OUTPATIENT)
Dept: LAB | Age: 31
End: 2021-01-06

## 2021-01-06 DIAGNOSIS — Z23 NEED FOR VACCINATION: Primary | ICD-10-CM

## 2021-01-06 PROCEDURE — 0002A COVID 19 PFIZER-BIONTECH: CPT

## 2021-01-06 PROCEDURE — 91300 COVID 19 PFIZER-BIONTECH: CPT

## 2021-03-03 ENCOUNTER — EMPLOYEE HEALTH (OUTPATIENT)
Dept: OTHER | Age: 31
End: 2021-03-03

## 2021-09-28 ENCOUNTER — TELEPHONE (OUTPATIENT)
Dept: SCHEDULING | Age: 31
End: 2021-09-28

## 2021-09-28 ENCOUNTER — OFFICE VISIT (OUTPATIENT)
Dept: FAMILY MEDICINE | Age: 31
End: 2021-09-28

## 2021-09-28 VITALS — RESPIRATION RATE: 16 BRPM | WEIGHT: 187 LBS | HEIGHT: 63 IN | BODY MASS INDEX: 33.13 KG/M2

## 2021-09-28 DIAGNOSIS — J45.20 MILD INTERMITTENT ASTHMA WITHOUT COMPLICATION: ICD-10-CM

## 2021-09-28 DIAGNOSIS — E01.0 THYROMEGALY: ICD-10-CM

## 2021-09-28 DIAGNOSIS — Z23 FLU VACCINE NEED: ICD-10-CM

## 2021-09-28 DIAGNOSIS — Z00.00 ANNUAL PHYSICAL EXAM: Primary | ICD-10-CM

## 2021-09-28 DIAGNOSIS — G43.909 MIGRAINE WITHOUT STATUS MIGRAINOSUS, NOT INTRACTABLE, UNSPECIFIED MIGRAINE TYPE: ICD-10-CM

## 2021-09-28 PROBLEM — M25.371 RIGHT ANKLE INSTABILITY: Status: ACTIVE | Noted: 2020-09-30

## 2021-09-28 PROBLEM — J45.909 ASTHMA: Status: ACTIVE | Noted: 2021-09-28

## 2021-09-28 PROCEDURE — 83036 HEMOGLOBIN GLYCOSYLATED A1C: CPT | Performed by: FAMILY MEDICINE

## 2021-09-28 PROCEDURE — 80050 GENERAL HEALTH PANEL: CPT | Performed by: FAMILY MEDICINE

## 2021-09-28 PROCEDURE — 99385 PREV VISIT NEW AGE 18-39: CPT | Performed by: FAMILY MEDICINE

## 2021-09-28 PROCEDURE — 36415 COLL VENOUS BLD VENIPUNCTURE: CPT

## 2021-09-28 PROCEDURE — 82607 VITAMIN B-12: CPT | Performed by: FAMILY MEDICINE

## 2021-09-28 PROCEDURE — 80061 LIPID PANEL: CPT | Performed by: FAMILY MEDICINE

## 2021-09-28 PROCEDURE — 82306 VITAMIN D 25 HYDROXY: CPT | Performed by: FAMILY MEDICINE

## 2021-09-28 PROCEDURE — 90686 IIV4 VACC NO PRSV 0.5 ML IM: CPT

## 2021-09-28 PROCEDURE — 90471 IMMUNIZATION ADMIN: CPT

## 2021-09-28 RX ORDER — ALBUTEROL SULFATE 90 UG/1
2 AEROSOL, METERED RESPIRATORY (INHALATION) EVERY 4 HOURS PRN
COMMUNITY
End: 2021-09-28 | Stop reason: SDUPTHER

## 2021-09-28 RX ORDER — IBUPROFEN AND FAMOTIDINE 26.6; 8 MG/1; MG/1
TABLET ORAL
COMMUNITY

## 2021-09-28 RX ORDER — ONDANSETRON 4 MG/1
4 TABLET, ORALLY DISINTEGRATING ORAL EVERY 8 HOURS PRN
COMMUNITY
End: 2021-09-29 | Stop reason: SDUPTHER

## 2021-09-28 RX ORDER — ALBUTEROL SULFATE 90 UG/1
2 AEROSOL, METERED RESPIRATORY (INHALATION) EVERY 4 HOURS PRN
Qty: 1 EACH | Refills: 3 | Status: SHIPPED | OUTPATIENT
Start: 2021-09-28

## 2021-09-28 ASSESSMENT — ENCOUNTER SYMPTOMS
SEIZURES: 0
SINUS PRESSURE: 0
DIZZINESS: 0
HALLUCINATIONS: 0
FACIAL ASYMMETRY: 0
FACIAL SWELLING: 0
HEADACHES: 1
WHEEZING: 0
CHILLS: 0
CONFUSION: 0
NAUSEA: 0
AGITATION: 0
NUMBNESS: 0
ABDOMINAL DISTENTION: 0
WOUND: 0
CONSTIPATION: 0
ADENOPATHY: 0
SHORTNESS OF BREATH: 0
SLEEP DISTURBANCE: 0
BACK PAIN: 0
SPEECH DIFFICULTY: 0
APPETITE CHANGE: 0
SORE THROAT: 0
SINUS PAIN: 0
COUGH: 0
CHEST TIGHTNESS: 0
VOMITING: 0
FEVER: 0
TROUBLE SWALLOWING: 0
RHINORRHEA: 0
ABDOMINAL PAIN: 0
FATIGUE: 0
LIGHT-HEADEDNESS: 0
ACTIVITY CHANGE: 0
NERVOUS/ANXIOUS: 0
EYES NEGATIVE: 1
WEAKNESS: 0
DIARRHEA: 0
BLOOD IN STOOL: 0

## 2021-09-28 ASSESSMENT — PATIENT HEALTH QUESTIONNAIRE - PHQ9
2. FEELING DOWN, DEPRESSED OR HOPELESS: NOT AT ALL
SUM OF ALL RESPONSES TO PHQ9 QUESTIONS 1 AND 2: 0
1. LITTLE INTEREST OR PLEASURE IN DOING THINGS: NOT AT ALL
SUM OF ALL RESPONSES TO PHQ9 QUESTIONS 1 AND 2: 0
CLINICAL INTERPRETATION OF PHQ2 SCORE: NO FURTHER SCREENING NEEDED
CLINICAL INTERPRETATION OF PHQ9 SCORE: NO FURTHER SCREENING NEEDED

## 2021-09-29 LAB
25(OH)D3+25(OH)D2 SERPL-MCNC: 23 NG/ML (ref 30–100)
ALBUMIN SERPL-MCNC: 3.7 G/DL (ref 3.6–5.1)
ALBUMIN/GLOB SERPL: 1.1 {RATIO} (ref 1–2.4)
ALP SERPL-CCNC: 81 UNITS/L (ref 45–117)
ALT SERPL-CCNC: 30 UNITS/L
ANION GAP SERPL CALC-SCNC: 13 MMOL/L (ref 10–20)
AST SERPL-CCNC: 15 UNITS/L
BASOPHILS # BLD: 0.1 K/MCL (ref 0–0.3)
BASOPHILS NFR BLD: 1 %
BILIRUB SERPL-MCNC: 0.4 MG/DL (ref 0.2–1)
BUN SERPL-MCNC: 14 MG/DL (ref 6–20)
BUN/CREAT SERPL: 21 (ref 7–25)
CALCIUM SERPL-MCNC: 8.9 MG/DL (ref 8.4–10.2)
CHLORIDE SERPL-SCNC: 109 MMOL/L (ref 98–107)
CHOLEST SERPL-MCNC: 188 MG/DL
CHOLEST/HDLC SERPL: 3.4 {RATIO}
CO2 SERPL-SCNC: 23 MMOL/L (ref 21–32)
CREAT SERPL-MCNC: 0.66 MG/DL (ref 0.51–0.95)
DEPRECATED RDW RBC: 44.5 FL (ref 39–50)
EOSINOPHIL # BLD: 0.1 K/MCL (ref 0–0.5)
EOSINOPHIL NFR BLD: 2 %
ERYTHROCYTE [DISTWIDTH] IN BLOOD: 13.8 % (ref 11–15)
FASTING DURATION TIME PATIENT: 12 HOURS (ref 0–999)
FASTING DURATION TIME PATIENT: NORMAL H
GFR SERPLBLD BASED ON 1.73 SQ M-ARVRAT: >90 ML/MIN
GLOBULIN SER-MCNC: 3.5 G/DL (ref 2–4)
GLUCOSE SERPL-MCNC: 83 MG/DL (ref 70–99)
HBA1C MFR BLD: 5.4 % (ref 4.5–5.6)
HCT VFR BLD CALC: 40.9 % (ref 36–46.5)
HDLC SERPL-MCNC: 55 MG/DL
HGB BLD-MCNC: 13 G/DL (ref 12–15.5)
IMM GRANULOCYTES # BLD AUTO: 0 K/MCL (ref 0–0.2)
IMM GRANULOCYTES # BLD: 0 %
LDLC SERPL CALC-MCNC: 115 MG/DL
LYMPHOCYTES # BLD: 1.8 K/MCL (ref 1–4.8)
LYMPHOCYTES NFR BLD: 30 %
MCH RBC QN AUTO: 27.7 PG (ref 26–34)
MCHC RBC AUTO-ENTMCNC: 31.8 G/DL (ref 32–36.5)
MCV RBC AUTO: 87 FL (ref 78–100)
MONOCYTES # BLD: 0.4 K/MCL (ref 0.3–0.9)
MONOCYTES NFR BLD: 6 %
NEUTROPHILS # BLD: 3.8 K/MCL (ref 1.8–7.7)
NEUTROPHILS NFR BLD: 61 %
NONHDLC SERPL-MCNC: 133 MG/DL
NRBC BLD MANUAL-RTO: 0 /100 WBC
PLATELET # BLD AUTO: 281 K/MCL (ref 140–450)
POTASSIUM SERPL-SCNC: 4.3 MMOL/L (ref 3.4–5.1)
PROT SERPL-MCNC: 7.2 G/DL (ref 6.4–8.2)
RBC # BLD: 4.7 MIL/MCL (ref 4–5.2)
SODIUM SERPL-SCNC: 141 MMOL/L (ref 135–145)
TRIGL SERPL-MCNC: 88 MG/DL
TSH SERPL-ACNC: 0.83 MCUNITS/ML (ref 0.35–5)
VIT B12 SERPL-MCNC: 674 PG/ML (ref 211–911)
WBC # BLD: 6.1 K/MCL (ref 4.2–11)

## 2021-09-29 RX ORDER — ONDANSETRON 4 MG/1
4 TABLET, ORALLY DISINTEGRATING ORAL EVERY 8 HOURS PRN
Qty: 30 TABLET | Refills: 1 | Status: SHIPPED | OUTPATIENT
Start: 2021-09-29

## 2021-10-05 ENCOUNTER — IMMUNIZATION (OUTPATIENT)
Dept: LAB | Age: 31
End: 2021-10-05

## 2021-10-05 DIAGNOSIS — Z23 NEED FOR VACCINATION: Primary | ICD-10-CM

## 2021-10-05 PROCEDURE — 91300 COVID 19 PFIZER-BIONTECH: CPT | Performed by: HOSPITALIST

## 2021-10-05 PROCEDURE — 0003A COVID 19 PFIZER-BIONTECH: CPT | Performed by: HOSPITALIST

## 2021-12-02 ENCOUNTER — NURSE ONLY (OUTPATIENT)
Dept: INTERNAL MEDICINE CLINIC | Facility: HOSPITAL | Age: 31
End: 2021-12-02
Attending: EMERGENCY MEDICINE

## 2021-12-02 DIAGNOSIS — Z00.00 WELLNESS EXAMINATION: Primary | ICD-10-CM

## 2021-12-02 LAB
HBV SURFACE AB SER QL: REACTIVE
HBV SURFACE AB SERPL IA-ACNC: 81.42 MIU/ML

## 2021-12-02 PROCEDURE — 86706 HEP B SURFACE ANTIBODY: CPT

## 2021-12-02 PROCEDURE — 86480 TB TEST CELL IMMUN MEASURE: CPT

## 2021-12-02 PROCEDURE — 86787 VARICELLA-ZOSTER ANTIBODY: CPT

## 2021-12-03 LAB
M TB IFN-G CD4+ T-CELLS BLD-ACNC: 0.02 IU/ML
M TB TUBERC IFN-G BLD QL: NEGATIVE
M TB TUBERC IGNF/MITOGEN IGNF CONTROL: >10 IU/ML
QFT TB1 AG MINUS NIL: 0.03 IU/ML
QFT TB2 AG MINUS NIL: 0.04 IU/ML
VZV IGG SER IA-ACNC: 758.4 (ref 165–?)

## 2022-02-25 ENCOUNTER — OFFICE VISIT (OUTPATIENT)
Dept: FAMILY MEDICINE CLINIC | Facility: CLINIC | Age: 32
End: 2022-02-25
Payer: COMMERCIAL

## 2022-02-25 ENCOUNTER — HOSPITAL ENCOUNTER (OUTPATIENT)
Dept: ULTRASOUND IMAGING | Age: 32
Discharge: HOME | End: 2022-02-25
Attending: FAMILY MEDICINE
Payer: COMMERCIAL

## 2022-02-25 VITALS
OXYGEN SATURATION: 98 % | DIASTOLIC BLOOD PRESSURE: 80 MMHG | SYSTOLIC BLOOD PRESSURE: 124 MMHG | TEMPERATURE: 98 F | WEIGHT: 190 LBS | HEIGHT: 62 IN | RESPIRATION RATE: 18 BRPM | BODY MASS INDEX: 34.96 KG/M2 | HEART RATE: 73 BPM

## 2022-02-25 DIAGNOSIS — Z83.49 FHX: THYROID DISEASE: ICD-10-CM

## 2022-02-25 DIAGNOSIS — Z00.00 ANNUAL PHYSICAL EXAM: Primary | ICD-10-CM

## 2022-02-25 LAB
ALBUMIN SERPL-MCNC: 3.6 G/DL (ref 3.4–5)
ALBUMIN/GLOB SERPL: 1 {RATIO} (ref 1–2)
ALP LIVER SERPL-CCNC: 82 U/L
ALT SERPL-CCNC: 19 U/L
ANION GAP SERPL CALC-SCNC: 6 MMOL/L (ref 0–18)
AST SERPL-CCNC: 8 U/L (ref 15–37)
BASOPHILS # BLD AUTO: 0.03 X10(3) UL (ref 0–0.2)
BASOPHILS NFR BLD AUTO: 0.4 %
BILIRUB SERPL-MCNC: 0.3 MG/DL (ref 0.1–2)
BUN BLD-MCNC: 9 MG/DL (ref 7–18)
CALCIUM BLD-MCNC: 9.1 MG/DL (ref 8.5–10.1)
CHLORIDE SERPL-SCNC: 109 MMOL/L (ref 98–112)
CHOLEST SERPL-MCNC: 152 MG/DL (ref ?–200)
CO2 SERPL-SCNC: 27 MMOL/L (ref 21–32)
CREAT BLD-MCNC: 0.74 MG/DL
EOSINOPHIL # BLD AUTO: 0.08 X10(3) UL (ref 0–0.7)
EOSINOPHIL NFR BLD AUTO: 1.1 %
ERYTHROCYTE [DISTWIDTH] IN BLOOD BY AUTOMATED COUNT: 14 %
EST. AVERAGE GLUCOSE BLD GHB EST-MCNC: 114 MG/DL (ref 68–126)
FASTING PATIENT LIPID ANSWER: YES
FASTING STATUS PATIENT QL REPORTED: YES
GLOBULIN PLAS-MCNC: 3.5 G/DL (ref 2.8–4.4)
HBA1C MFR BLD: 5.6 % (ref ?–5.7)
HCT VFR BLD AUTO: 41 %
HDLC SERPL-MCNC: 48 MG/DL (ref 40–59)
HGB BLD-MCNC: 12.7 G/DL
IMM GRANULOCYTES # BLD AUTO: 0.02 X10(3) UL (ref 0–1)
IMM GRANULOCYTES NFR BLD: 0.3 %
LDLC SERPL CALC-MCNC: 88 MG/DL (ref ?–100)
LYMPHOCYTES # BLD AUTO: 1.92 X10(3) UL (ref 1–4)
LYMPHOCYTES NFR BLD AUTO: 25.8 %
MCH RBC QN AUTO: 27.9 PG (ref 26–34)
MCV RBC AUTO: 89.9 FL
MONOCYTES # BLD AUTO: 0.43 X10(3) UL (ref 0.1–1)
MONOCYTES NFR BLD AUTO: 5.8 %
NEUTROPHILS # BLD AUTO: 4.95 X10 (3) UL (ref 1.5–7.7)
NEUTROPHILS # BLD AUTO: 4.95 X10(3) UL (ref 1.5–7.7)
NEUTROPHILS NFR BLD AUTO: 66.6 %
NONHDLC SERPL-MCNC: 104 MG/DL (ref ?–130)
OSMOLALITY SERPL CALC.SUM OF ELEC: 292 MOSM/KG (ref 275–295)
PLATELET # BLD AUTO: 279 10(3)UL (ref 150–450)
POTASSIUM SERPL-SCNC: 4.2 MMOL/L (ref 3.5–5.1)
PROT SERPL-MCNC: 7.1 G/DL (ref 6.4–8.2)
RBC # BLD AUTO: 4.56 X10(6)UL
SODIUM SERPL-SCNC: 142 MMOL/L (ref 136–145)
THYROGLOB SERPL-MCNC: 102 U/ML (ref ?–60)
THYROPEROXIDASE AB SERPL-ACNC: 93 U/ML (ref ?–60)
TRIGL SERPL-MCNC: 84 MG/DL (ref 30–149)
TSI SER-ACNC: 0.61 MIU/ML (ref 0.36–3.74)
VLDLC SERPL CALC-MCNC: 13 MG/DL (ref 0–30)
WBC # BLD AUTO: 7.4 X10(3) UL (ref 4–11)

## 2022-02-25 PROCEDURE — 76536 US EXAM OF HEAD AND NECK: CPT | Performed by: FAMILY MEDICINE

## 2022-02-25 PROCEDURE — 80050 GENERAL HEALTH PANEL: CPT | Performed by: FAMILY MEDICINE

## 2022-02-25 PROCEDURE — 86376 MICROSOMAL ANTIBODY EACH: CPT | Performed by: FAMILY MEDICINE

## 2022-02-25 PROCEDURE — 99395 PREV VISIT EST AGE 18-39: CPT | Performed by: FAMILY MEDICINE

## 2022-02-25 PROCEDURE — 3074F SYST BP LT 130 MM HG: CPT | Performed by: FAMILY MEDICINE

## 2022-02-25 PROCEDURE — 83036 HEMOGLOBIN GLYCOSYLATED A1C: CPT | Performed by: FAMILY MEDICINE

## 2022-02-25 PROCEDURE — 3079F DIAST BP 80-89 MM HG: CPT | Performed by: FAMILY MEDICINE

## 2022-02-25 PROCEDURE — 86800 THYROGLOBULIN ANTIBODY: CPT | Performed by: FAMILY MEDICINE

## 2022-02-25 PROCEDURE — 3008F BODY MASS INDEX DOCD: CPT | Performed by: FAMILY MEDICINE

## 2022-02-25 PROCEDURE — 80061 LIPID PANEL: CPT | Performed by: FAMILY MEDICINE

## 2022-02-25 RX ORDER — IBUPROFEN AND FAMOTIDINE 26.6; 8 MG/1; MG/1
TABLET, FILM COATED ORAL
COMMUNITY
Start: 2020-10-13

## 2022-03-18 ENCOUNTER — PATIENT MESSAGE (OUTPATIENT)
Dept: FAMILY MEDICINE CLINIC | Facility: CLINIC | Age: 32
End: 2022-03-18

## 2022-03-18 NOTE — TELEPHONE ENCOUNTER
From: Saintclair Hurry  To: Yoli Card MD  Sent: 3/18/2022 12:06 PM CDT  Subject: Doctor referral    Hello during our visit we discussed a new ob/gyn and a neurologist but I can not find the names in my chart. Can you please tell me the names again? Thank you!

## 2022-03-21 NOTE — TELEPHONE ENCOUNTER
Dr. Gabriel Santos or [facility] for neuro  Dr. Bentley Malhotra at [facility] or Dr. Katt Marc at [facility] for gyne

## 2022-03-26 ENCOUNTER — TELEPHONE (OUTPATIENT)
Dept: FAMILY MEDICINE CLINIC | Facility: CLINIC | Age: 32
End: 2022-03-26

## 2022-03-26 NOTE — TELEPHONE ENCOUNTER
Pt states that Dr. Lanny Alcocer is thru [address]- pt has [employer] PPO ins, she doesn't believe that she can go there.

## 2022-03-26 NOTE — TELEPHONE ENCOUNTER
Advised Dr Elana Phillips is on [facility] list as in network provider. Patient advised. Verbalized understanding.

## 2022-03-26 NOTE — TELEPHONE ENCOUNTER
----- Message from Greg Carlson MD sent at 3/26/2022  2:41 AM CDT -----  Thyroid Ab elevated. Thyroid ultrasound wnl. TSH is nl so thyroid function nl. No meds needed at this time. Refer to endo for consult for recs on follow up.  Dr. Vladimir Lee or Jose Estrada

## 2022-04-12 ENCOUNTER — OFFICE VISIT (OUTPATIENT)
Dept: NEUROLOGY | Facility: CLINIC | Age: 32
End: 2022-04-12
Payer: COMMERCIAL

## 2022-04-12 VITALS
BODY MASS INDEX: 34.78 KG/M2 | WEIGHT: 189 LBS | SYSTOLIC BLOOD PRESSURE: 112 MMHG | HEIGHT: 62 IN | DIASTOLIC BLOOD PRESSURE: 76 MMHG | HEART RATE: 70 BPM

## 2022-04-12 DIAGNOSIS — G43.109 MIGRAINE WITH AURA AND WITHOUT STATUS MIGRAINOSUS, NOT INTRACTABLE: Primary | ICD-10-CM

## 2022-04-12 PROCEDURE — 3074F SYST BP LT 130 MM HG: CPT | Performed by: OTHER

## 2022-04-12 PROCEDURE — 3008F BODY MASS INDEX DOCD: CPT | Performed by: OTHER

## 2022-04-12 PROCEDURE — 3078F DIAST BP <80 MM HG: CPT | Performed by: OTHER

## 2022-04-12 PROCEDURE — 99204 OFFICE O/P NEW MOD 45 MIN: CPT | Performed by: OTHER

## 2022-04-12 RX ORDER — ONDANSETRON 4 MG/1
4 TABLET, ORALLY DISINTEGRATING ORAL EVERY 8 HOURS PRN
Qty: 30 TABLET | Refills: 2 | Status: SHIPPED | OUTPATIENT
Start: 2022-04-12

## 2022-04-12 RX ORDER — CLINDAMYCIN PHOSPHATE 10 UG/ML
LOTION TOPICAL
COMMUNITY
Start: 2022-03-30

## 2022-04-21 ENCOUNTER — TELEPHONE (OUTPATIENT)
Dept: NEUROLOGY | Facility: CLINIC | Age: 32
End: 2022-04-21

## 2022-05-09 ENCOUNTER — PATIENT MESSAGE (OUTPATIENT)
Dept: FAMILY MEDICINE CLINIC | Facility: CLINIC | Age: 32
End: 2022-05-09

## 2022-05-17 ENCOUNTER — PATIENT MESSAGE (OUTPATIENT)
Dept: NEUROLOGY | Facility: CLINIC | Age: 32
End: 2022-05-17

## 2022-05-17 DIAGNOSIS — G43.109 MIGRAINE WITH AURA AND WITHOUT STATUS MIGRAINOSUS, NOT INTRACTABLE: Primary | ICD-10-CM

## 2022-05-17 NOTE — TELEPHONE ENCOUNTER
Patient has history of eczema  Has only tried Aquaphor  Advised trying over-the-counter hydrocortisone 1 twice daily for 1 week  Call back if not resolved

## 2022-05-20 NOTE — TELEPHONE ENCOUNTER
Elkin Ellington MD  [facility] Nurse 40 minutes ago (12:57 PM)         It a combo with Ibuprofen and famotodine and I can prescribe but where should I sent the script. Thanks    Message text      Unable to read dosing schedule on bottle in picture. Sent pt message asking for verification. RX started and correct pharmacy added for when pt responds.

## 2022-05-23 ENCOUNTER — PATIENT MESSAGE (OUTPATIENT)
Dept: FAMILY MEDICINE CLINIC | Facility: CLINIC | Age: 32
End: 2022-05-23

## 2022-05-23 RX ORDER — IBUPROFEN AND FAMOTIDINE 26.6; 8 MG/1; MG/1
TABLET, FILM COATED ORAL
Qty: 90 TABLET | Refills: 0 | Status: SHIPPED | OUTPATIENT
Start: 2022-05-23

## 2022-05-25 ENCOUNTER — OFFICE VISIT (OUTPATIENT)
Dept: OBGYN CLINIC | Facility: CLINIC | Age: 32
End: 2022-05-25
Payer: COMMERCIAL

## 2022-05-25 VITALS
BODY MASS INDEX: 36 KG/M2 | WEIGHT: 197.13 LBS | DIASTOLIC BLOOD PRESSURE: 68 MMHG | SYSTOLIC BLOOD PRESSURE: 114 MMHG | HEART RATE: 98 BPM

## 2022-05-25 DIAGNOSIS — Z12.4 ENCOUNTER FOR SCREENING FOR CERVICAL CANCER: ICD-10-CM

## 2022-05-25 DIAGNOSIS — Z01.419 WELL WOMAN EXAM WITH ROUTINE GYNECOLOGICAL EXAM: Primary | ICD-10-CM

## 2022-05-25 PROCEDURE — 3074F SYST BP LT 130 MM HG: CPT | Performed by: OBSTETRICS & GYNECOLOGY

## 2022-05-25 PROCEDURE — 88175 CYTOPATH C/V AUTO FLUID REDO: CPT | Performed by: OBSTETRICS & GYNECOLOGY

## 2022-05-25 PROCEDURE — 3078F DIAST BP <80 MM HG: CPT | Performed by: OBSTETRICS & GYNECOLOGY

## 2022-05-25 PROCEDURE — 99395 PREV VISIT EST AGE 18-39: CPT | Performed by: OBSTETRICS & GYNECOLOGY

## 2022-05-31 LAB — HPV I/H RISK 1 DNA SPEC QL NAA+PROBE: NEGATIVE

## 2022-06-08 ENCOUNTER — TELEPHONE (OUTPATIENT)
Dept: NEUROLOGY | Facility: CLINIC | Age: 32
End: 2022-06-08

## 2022-06-08 NOTE — TELEPHONE ENCOUNTER
Received determination from [employer]. Ubrelvy 50mg tablets has been granted 6/8/22-6/8/23. The patient has been informed. Form has been sent to scanning. Reviewed and electronically signed by:  [address], [facility]

## 2022-06-08 NOTE — TELEPHONE ENCOUNTER
Received PA form from [employer] for Ubrelvy 50 mg.  PA form filled out and faxed back to [employer] today. Will await determination to be faxed to office.

## 2022-06-09 ENCOUNTER — LAB ENCOUNTER (OUTPATIENT)
Dept: LAB | Age: 32
End: 2022-06-09
Attending: PREVENTIVE MEDICINE

## 2022-06-09 ENCOUNTER — TELEPHONE (OUTPATIENT)
Dept: INTERNAL MEDICINE CLINIC | Facility: HOSPITAL | Age: 32
End: 2022-06-09

## 2022-06-09 DIAGNOSIS — Z20.822 SUSPECTED COVID-19 VIRUS INFECTION: Primary | ICD-10-CM

## 2022-06-09 DIAGNOSIS — Z20.822 SUSPECTED COVID-19 VIRUS INFECTION: ICD-10-CM

## 2022-06-09 LAB — SARS-COV-2 RNA RESP QL NAA+PROBE: DETECTED

## 2022-06-09 NOTE — TELEPHONE ENCOUNTER
Results and RTW guidelines:    COVID RESULT:    [x] Viewed by employee in [facility]. RTW plan and instructions as indicated on triage call. Manager notified. Estimated RTW date: 6/13  [x] Discussed with employee   [] Unable to reach by phone. Sent via [employer] message      Test type:    [x] Rapid         [] Alinity         [] Outside test:       [x] Positive  Is employee unvaccinated? Yes []   No [x]          If yes:  Enter Covid Positive Medical exemption on Exemption Spreadsheet    Did you have close contact with someone on your unit while not wearing a mask? (e.g., during meal breaks):  Yes []   No []     If yes, who:     - Employee should quarantine at home for at least 5 days (day 1 is day after sx onset) , follow the CDC guidelines for cleaning and                              quarantining; see CDC.gov   -This employee may RTW on day 6 if asymptomatic or mildly symptomatic (with improving symptoms). Call Employee Health on day 5 if unable to return on day 6 after                      symptom onset.    -This employee needs to call [employer] on day 5 after symptom onset. The employee needs to be cleared by [facility]. - Monitor symptoms and temperature                 - Notify PCP of result                 - Seek emergent care with worsening symptoms   - If employee is still experiencing severe symptoms on day 5 must make a RTW appt with [employer], Employee will not be cleared if:    1. Has consistent cough, shortness of breath or fatigue that restricts your physical activities    2. Is still feeling \"unwell\"    3. Within 15 days of hospitalization for COVID    4. Within 20 days of intubation for COVID    5.  Still has a fever, vomiting or diarrhea   - Keep communication open with management about RTW and if symptoms worsen                - If outside testing completed, bring a copy of result to RTW appointment           Notes:     RTW PLAN:    [x]  If COVID positive results, off work minimum of 5 days from positive test or onset of symptoms (day 0)        On day 5, if asymptomatic or mildly symptomatic (with improving symptoms) may return to work day 6          On day 5, if symptomatic, call Employee Health for RTW screening        []  COVID positive result - call Employee Health on day 5 after symptom onset. The employee needs to be cleared by Employee Health to RTW. [] RTW immediately, continue to monitor for sx  [] RTW when sx improve; must be fever free for 24 hours w/o medications, Diarrhea/Vomiting free for 24 hours w/o medications  [] Alinity ordered; continue to monitor sx and call for new/worsening sx.   Discuss RTW guidelines with manager  [] May continue to work  [x] Follow up with PCP  [] Home until further instruction from hotline with Alinity results  INSTRUCTIONS PROVIDED:  [x]  Plan as noted above  []  Length of time to obtain results   [x]  Quarantine instructions  [x]  Masking protocol   []  S/S of worsening infection/condition and importance of prompt medical re-evaluation including when to seek emergency care  [] If symptoms develop, stay home and call hotline for rapid test order    Estimated RTW date:  6/13    [x] The employee voiced understanding of above plan/instructions  [x] Manager Notified

## 2022-06-27 ENCOUNTER — TELEPHONE (OUTPATIENT)
Dept: NEUROLOGY | Facility: CLINIC | Age: 32
End: 2022-06-27

## 2022-06-27 NOTE — TELEPHONE ENCOUNTER
pt states she rec'd a call for a prior authorization for the Catrachito Pontiff however pt states she needed a prior authorization for the Duexis because it works better for her and pt stated she had a migrain yesturday and actually happened to take the Catrachito Pontiff but it did not work for her like the Chun Carter did.

## 2022-10-07 ENCOUNTER — OFFICE VISIT (OUTPATIENT)
Dept: OBGYN CLINIC | Facility: CLINIC | Age: 32
End: 2022-10-07
Payer: COMMERCIAL

## 2022-10-07 ENCOUNTER — TELEPHONE (OUTPATIENT)
Dept: OBGYN CLINIC | Facility: CLINIC | Age: 32
End: 2022-10-07

## 2022-10-07 VITALS
HEART RATE: 74 BPM | WEIGHT: 202.5 LBS | SYSTOLIC BLOOD PRESSURE: 112 MMHG | DIASTOLIC BLOOD PRESSURE: 74 MMHG | BODY MASS INDEX: 37 KG/M2

## 2022-10-07 DIAGNOSIS — Z30.09 ENCOUNTER FOR GENERAL COUNSELING AND ADVICE ON CONTRACEPTIVE MANAGEMENT: Primary | ICD-10-CM

## 2022-10-07 PROCEDURE — 3078F DIAST BP <80 MM HG: CPT | Performed by: NURSE PRACTITIONER

## 2022-10-07 PROCEDURE — 99213 OFFICE O/P EST LOW 20 MIN: CPT | Performed by: NURSE PRACTITIONER

## 2022-10-07 PROCEDURE — 3074F SYST BP LT 130 MM HG: CPT | Performed by: NURSE PRACTITIONER

## 2022-10-07 RX ORDER — IBUPROFEN 400 MG/1
TABLET ORAL
COMMUNITY
Start: 2022-08-24 | End: 2022-10-07

## 2022-10-07 RX ORDER — NORETHINDRONE ACETATE AND ETHINYL ESTRADIOL AND FERROUS FUMARATE 1MG-20(24)
1 KIT ORAL DAILY
COMMUNITY
Start: 2022-08-24

## 2022-10-07 RX ORDER — MISOPROSTOL 200 UG/1
TABLET ORAL
COMMUNITY
Start: 2022-08-24

## 2022-10-07 NOTE — PROGRESS NOTES
Subjective:  34year old    No chief complaint on file. Patient here today to discuss contraceptive options  Currently on OCP and does not feel that it working with her body  Had Keira Yang in the past but experienced persistent pelvic pain until Cornelia removed  Used depo but experienced weight gain  Looking for implanted option    Review of Systems:  Pertinent items are noted in the HPI. Objective:  /74   Pulse 74   Wt 202 lb 8 oz (91.9 kg)   LMP 2022     Physical Examination:  General appearance: Well dressed, well nourished in no apparent distress  Neurologic/Psychiatric: Alert and oriented to person, place and time, mood normal, affect appropriate    Assessment/Plan:    Diagnoses and all orders for this visit:    Encounter for general counseling and advice on contraceptive management  - discussed options including pill, depo, implant and IUD  - patient thinks she would like either implant, Mirena IUD or Renetta [place] IUD  - Discussed risks/benefits and pros/cons of IUD and implant  - brochures given  - patient to take tylenol prior to procedure  - to come with full bladder for UPT before procudure  - will call to schedule when decision has been reached  - follow up as needed or for placement      Return for for Nexplanon or IUD placement.

## 2022-10-07 NOTE — TELEPHONE ENCOUNTER
Patient completed Nexplanon form, placed in nurse bin in [facility]. Appointment scheduled for November 18th.

## 2022-10-17 ENCOUNTER — IMMUNIZATION (OUTPATIENT)
Age: 32
End: 2022-10-17
Attending: PREVENTIVE MEDICINE
Payer: COMMERCIAL

## 2022-10-17 DIAGNOSIS — Z23 NEED FOR VACCINATION: Primary | ICD-10-CM

## 2022-10-17 PROCEDURE — 90471 IMMUNIZATION ADMIN: CPT

## 2022-10-20 ENCOUNTER — IMMUNIZATION (OUTPATIENT)
Age: 32
End: 2022-10-20
Attending: PREVENTIVE MEDICINE
Payer: COMMERCIAL

## 2022-10-20 DIAGNOSIS — Z23 NEED FOR VACCINATION: Primary | ICD-10-CM

## 2022-10-20 PROCEDURE — 0124A SARSCOV2 VAC BVL 30MCG/0.3ML: CPT

## 2022-11-14 ENCOUNTER — HOSPITAL ENCOUNTER (OUTPATIENT)
Dept: GENERAL RADIOLOGY | Age: 32
Discharge: HOME | End: 2022-11-14
Attending: PHYSICIAN ASSISTANT
Payer: COMMERCIAL

## 2022-11-14 ENCOUNTER — TELEPHONE (OUTPATIENT)
Dept: ORTHOPEDICS CLINIC | Facility: CLINIC | Age: 32
End: 2022-11-14

## 2022-11-14 DIAGNOSIS — M25.559 HIP PAIN: Primary | ICD-10-CM

## 2022-11-14 DIAGNOSIS — M25.559 HIP PAIN: ICD-10-CM

## 2022-11-14 PROCEDURE — 73522 X-RAY EXAM HIPS BI 3-4 VIEWS: CPT | Performed by: PHYSICIAN ASSISTANT

## 2022-11-14 NOTE — TELEPHONE ENCOUNTER
Lashonda Trujillo,    This new patient is on your schedule for about a month from now, your 1st opening in [place], as she works here in the [employer] [facility]. She mentioned that she had been previously diagnosised with Femoroacetabular impingement or \"GABRIELE. \"    Her Xrays were done today and I wanted to make sure that you were an appropriate provider for her to see. Future Appointments   Date Time Provider Best Sanchez   12/14/2022  9:30 AM Alicia JACOBS PA-C [facility] [place]         I'll be happy to call her if I need to reschedule her. Please advise. Thanks!

## 2022-11-14 NOTE — TELEPHONE ENCOUNTER
Please enter an Xray RX for a DEISI HIPS for  this patient's upcoming appointment, as the patient has not had any imaging yet. Patient was instructed to get X-rays before appt, so they will be calling [phone] to get scheduled for Xrays before their appointment. Thank you very much!     Future Appointments   Date Time Provider Best Sanchez   11/18/2022 11:00 AM SHERMAN Modi [facility] [address]   12/14/2022  9:30 AM Harper Muñoz PA-C [facility] [place]

## 2022-11-18 ENCOUNTER — OFFICE VISIT (OUTPATIENT)
Dept: OBGYN CLINIC | Facility: CLINIC | Age: 32
End: 2022-11-18
Payer: COMMERCIAL

## 2022-11-18 VITALS
DIASTOLIC BLOOD PRESSURE: 78 MMHG | BODY MASS INDEX: 36.95 KG/M2 | WEIGHT: 200.81 LBS | HEIGHT: 62 IN | HEART RATE: 80 BPM | SYSTOLIC BLOOD PRESSURE: 100 MMHG

## 2022-11-18 DIAGNOSIS — Z30.017 INSERTION OF IMPLANTABLE SUBDERMAL CONTRACEPTIVE: Primary | ICD-10-CM

## 2022-11-18 DIAGNOSIS — Z01.812 PRE-PROCEDURAL LABORATORY EXAMINATION: ICD-10-CM

## 2022-11-18 LAB
CONTROL LINE PRESENT WITH A CLEAR BACKGROUND (YES/NO): YES YES/NO
KIT LOT #: NORMAL NUMERIC

## 2022-11-18 PROCEDURE — 11981 INSERTION DRUG DLVR IMPLANT: CPT | Performed by: NURSE PRACTITIONER

## 2022-11-18 PROCEDURE — 81025 URINE PREGNANCY TEST: CPT | Performed by: NURSE PRACTITIONER

## 2022-11-18 NOTE — PROGRESS NOTES
Subjective:  34year old    Patient presents with:  Procedure: Nexplanon insert    Review of Systems:  Pertinent items are noted in the HPI. Objective:  /78   Pulse 80   Ht 62\"   Wt 200 lb 12.8 oz (91.1 kg)   LMP 2022     Physical Examination:  General appearance: Well dressed, well nourished in no apparent distress  Neurologic/Psychiatric: Alert and oriented to person, place and time, mood normal, affect appropriate    Assessment/Plan:    Diagnoses and all orders for this visit:    Insertion of implantable subdermal contraceptive  -     Etonogestrel IMPL 1 each  -     INSERTION, NON-BIODEGRADABLE DRUG DELIVERY IMPLANT - see procedure note    Pre-procedural laboratory examination  -     URINE PREGNANCY TEST - negative      Return in about 6 months (around 2023) for well women exam or sooner if needed.

## 2022-11-18 NOTE — PROCEDURES
Nexplanon Insertion Procedure Note    Post-Operative Diagnosis and Indication: Desires contraception    Procedure Details:   Urine pregnancy test negative. Consent signed for Nexplanon. The risks including infection, scarring, bleeding, difficulty with removal, migration, side effects and failure rate were explained to the patient and informed consent obtained. Procedure performed under sterile conditions with betadine prep. The patient reported she is right hand dominant. Therefore, decision was made to place the Nexplanon implant on the patient's left arm. Incision site marked 8 cm proximal to the medial epicondyle of the upper left arm, 3 cm posterior to the sulcus between the biceps and triceps muscle, with second osiris 5 cm proximal to insertion site, and the insertion site infiltrated with 1% lidocaine solution. The Nexplanon applicator was then removed from the package. The transparent cap was removed and the applicator was examined to confirm the presence of the Nexplanon device. The Nexplanon applicator was then brought towards the incision and inserted with the tip of the needle at about a 30 degree angle. The Nexplanon applicator was then lowered to a horizontal position and slowly advanced proximal to the insertion site until the full length of needle was noted to be just under the skin. The Nexplanon applicator was then deployed and subsequently removed. Implant palpated in correct subdermal space immediately after insertion. Steri-strips, bandaid and wrap bandage applied. The patient was instructed to keep the pressure bandage in place for 24 hours. The patient tolerated the procedure well. She was counseled on the recommendation for back up method of contraception for 7 days after placement. The patient verbalized understanding. Condition:  Stable    Complications:  None    Plan:  The patient was advised to call for any fever, redness, bruising, discharge or worsening pain.   Follow up as needed or for routine gynecologic examination.

## 2022-11-18 NOTE — PATIENT INSTRUCTIONS
[facility] [place]  After Care Instructions for Nexplanon       Bleeding    You may experience irregular bleeding for the first 3-6 months. If your bleeding becomes heavier than a normal menstrual cycle, please contact our office. Pain   You may experience mild pain to the arm typically lasting 24-48hrs. You may use Ibuprofen, Aleve and Tylenol to relieve the discomfort. If you experience severe or persistent pain contact our office. Restrictions    A bandage was placed on your arm to cover the site where the Nexplanon was inserted. Leave the larger bandage on for 12 hours and keep the smaller bandage clean, dry, and in place for 24-48 hours. When to contact our office   If you are experiencing discomfort described as worse than sore pain to your arm that's not relieved after taking Ibuprofen. Fever of 100.4 or greater with increase swelling or redness to your arm. Vaginal bleeding that is saturating 1 pad per hour. If you have additional questions or concerns, please call us at [phone].

## 2022-12-14 ENCOUNTER — OFFICE VISIT (OUTPATIENT)
Dept: ORTHOPEDICS CLINIC | Facility: CLINIC | Age: 32
End: 2022-12-14
Payer: COMMERCIAL

## 2022-12-14 DIAGNOSIS — M25.852 LEFT HIP IMPINGEMENT SYNDROME: Primary | ICD-10-CM

## 2022-12-14 PROCEDURE — 99204 OFFICE O/P NEW MOD 45 MIN: CPT | Performed by: PHYSICIAN ASSISTANT

## 2022-12-16 ENCOUNTER — OFFICE VISIT (OUTPATIENT)
Dept: DERMATOLOGY CLINIC | Facility: CLINIC | Age: 32
End: 2022-12-16
Payer: COMMERCIAL

## 2022-12-16 DIAGNOSIS — Z12.83 SCREENING EXAM FOR SKIN CANCER: Primary | ICD-10-CM

## 2022-12-16 DIAGNOSIS — L20.82 FLEXURAL ECZEMA: ICD-10-CM

## 2022-12-16 DIAGNOSIS — L82.1 SEBORRHEIC KERATOSIS: ICD-10-CM

## 2022-12-16 PROCEDURE — 99203 OFFICE O/P NEW LOW 30 MIN: CPT | Performed by: PHYSICIAN ASSISTANT

## 2022-12-16 RX ORDER — FLUOCINONIDE 0.5 MG/G
1 OINTMENT TOPICAL 2 TIMES DAILY
Qty: 60 G | Refills: 2 | Status: SHIPPED | OUTPATIENT
Start: 2022-12-16

## 2023-01-03 ENCOUNTER — HOSPITAL ENCOUNTER (OUTPATIENT)
Dept: MRI IMAGING | Age: 33
Discharge: HOME | End: 2023-01-03
Attending: PHYSICIAN ASSISTANT
Payer: COMMERCIAL

## 2023-01-03 DIAGNOSIS — M25.852 LEFT HIP IMPINGEMENT SYNDROME: ICD-10-CM

## 2023-01-03 PROCEDURE — 73721 MRI JNT OF LWR EXTRE W/O DYE: CPT | Performed by: PHYSICIAN ASSISTANT

## 2023-01-11 ENCOUNTER — OFFICE VISIT (OUTPATIENT)
Dept: ORTHOPEDICS CLINIC | Facility: CLINIC | Age: 33
End: 2023-01-11
Payer: COMMERCIAL

## 2023-01-11 DIAGNOSIS — S73.192A TEAR OF LEFT ACETABULAR LABRUM, INITIAL ENCOUNTER: Primary | ICD-10-CM

## 2023-01-11 DIAGNOSIS — M25.852 LEFT HIP IMPINGEMENT SYNDROME: ICD-10-CM

## 2023-01-11 PROCEDURE — 99214 OFFICE O/P EST MOD 30 MIN: CPT | Performed by: PHYSICIAN ASSISTANT

## 2023-01-11 NOTE — PROGRESS NOTES
[facility] Progress Note      Chief Complaint:  Left hip pain      Subjective: Porfirio Samson is a 34year old female who is here for reevaluation of her left hip. She recently underwent MRI scan in preparation of possible arthroscopy due to previous diagnoses of impingement of the left hip and possible labral tear. She continues to have pain in the left hip that radiates into the left groin. 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 She does note episodes of instability where it feels like the hip is going to give out. She has failed conservative treatment including oral anti-inflammatories, activity modification, and exercises that she had learned previously in physical therapy. She also enjoys weightlifting and has been avoiding activities like deep squats and lunges. She is here to review the MRI scan and neck steps. Objective: Exam of the left hip and lower extremity reveals that she has no significant pain with logroll. She has pain with flexion past 90 degrees. She has pain with internal and external rotation of the hip. Sensation is present to light touch. Mild tenderness to palpation over the greater trochanteric bursa. MRI HIPS, LEFT (CPT=73721)    Result Date: 1/3/2023  CONCLUSION: Anterosuperior labrum tear on a background of labral degeneration. 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 I discussed conservative treatment including continued oral anti-inflammatories, avoidance of activities like deep squats and lunges, and possible cortisone injection.   I also discussed surgical intervention with a left hip arthroscopy with debridement versus repair. She is a busy radiology technician in our office and is unable to take off a significant amount of time from work right now. She would like to try the cortisone injection and this will be done by the radiologist at [facility].  She will see how effective the injection is and for how long. If symptoms are recurrent within a short period of time, I would like her to follow-up with Dr. Ivy Franklin for surgical discussion. She understands and will follow up with him on an as-needed basis. A student was present during the visit after the patient's consent.         Olga Cosby PA-C  Orthopedic Surgery   [facility]  [address]   t: [phone]  f: [phone]

## 2023-01-18 ENCOUNTER — TELEPHONE (OUTPATIENT)
Dept: FAMILY MEDICINE CLINIC | Facility: CLINIC | Age: 33
End: 2023-01-18

## 2023-01-18 DIAGNOSIS — Z00.00 ANNUAL PHYSICAL EXAM: Primary | ICD-10-CM

## 2023-01-18 NOTE — TELEPHONE ENCOUNTER
Pt scheduled an appt on 3/3/23,  Pt wants to have the labs done before her appt and go over @ px. Pt wants a full panel for her bloodwork, including STD testing (hepatitis)   Future Appointments   Date Time Provider Best Sanchez   2/10/2023 10:30 AM  XR [facility] ([address] [place] [address]) [id number] [facility]   3/3/2023 11:30 AM Real Cabral MD [facility] [place]     Please call pt when orders are placed, she will go to a different lab.

## 2023-01-18 NOTE — TELEPHONE ENCOUNTER
Normal labs pended along with HSV and HIV. Unsure what other labs you would want to add to this. Please advise.

## 2023-01-19 ENCOUNTER — LAB ENCOUNTER (OUTPATIENT)
Dept: LAB | Age: 33
End: 2023-01-19
Attending: FAMILY MEDICINE
Payer: COMMERCIAL

## 2023-01-19 DIAGNOSIS — Z00.00 ANNUAL PHYSICAL EXAM: ICD-10-CM

## 2023-01-19 LAB
ALBUMIN SERPL-MCNC: 3.6 G/DL (ref 3.4–5)
ALBUMIN/GLOB SERPL: 1 {RATIO} (ref 1–2)
ALP LIVER SERPL-CCNC: 76 U/L
ALT SERPL-CCNC: 23 U/L
ANION GAP SERPL CALC-SCNC: 8 MMOL/L (ref 0–18)
AST SERPL-CCNC: 14 U/L (ref 15–37)
BASOPHILS # BLD AUTO: 0.04 X10(3) UL (ref 0–0.2)
BASOPHILS NFR BLD AUTO: 0.6 %
BILIRUB SERPL-MCNC: 0.6 MG/DL (ref 0.1–2)
BUN BLD-MCNC: 14 MG/DL (ref 7–18)
CALCIUM BLD-MCNC: 8.9 MG/DL (ref 8.5–10.1)
CHLORIDE SERPL-SCNC: 110 MMOL/L (ref 98–112)
CHOLEST SERPL-MCNC: 154 MG/DL (ref ?–200)
CO2 SERPL-SCNC: 22 MMOL/L (ref 21–32)
CREAT BLD-MCNC: 0.72 MG/DL
EOSINOPHIL # BLD AUTO: 0.15 X10(3) UL (ref 0–0.7)
EOSINOPHIL NFR BLD AUTO: 2.1 %
ERYTHROCYTE [DISTWIDTH] IN BLOOD BY AUTOMATED COUNT: 14.6 %
EST. AVERAGE GLUCOSE BLD GHB EST-MCNC: 117 MG/DL (ref 68–126)
FASTING PATIENT LIPID ANSWER: YES
FASTING STATUS PATIENT QL REPORTED: YES
GFR SERPLBLD BASED ON 1.73 SQ M-ARVRAT: 113 ML/MIN/1.73M2 (ref 60–?)
GLOBULIN PLAS-MCNC: 3.7 G/DL (ref 2.8–4.4)
GLUCOSE BLD-MCNC: 92 MG/DL (ref 70–99)
HBA1C MFR BLD: 5.7 % (ref ?–5.7)
HCT VFR BLD AUTO: 40.5 %
HDLC SERPL-MCNC: 41 MG/DL (ref 40–59)
HGB BLD-MCNC: 13.2 G/DL
IMM GRANULOCYTES # BLD AUTO: 0.02 X10(3) UL (ref 0–1)
IMM GRANULOCYTES NFR BLD: 0.3 %
LDLC SERPL CALC-MCNC: 97 MG/DL (ref ?–100)
LYMPHOCYTES # BLD AUTO: 2.25 X10(3) UL (ref 1–4)
LYMPHOCYTES NFR BLD AUTO: 31.9 %
MCH RBC QN AUTO: 28.1 PG (ref 26–34)
MCHC RBC AUTO-ENTMCNC: 32.6 G/DL (ref 31–37)
MCV RBC AUTO: 86.4 FL
MONOCYTES # BLD AUTO: 0.4 X10(3) UL (ref 0.1–1)
MONOCYTES NFR BLD AUTO: 5.7 %
NEUTROPHILS # BLD AUTO: 4.19 X10 (3) UL (ref 1.5–7.7)
NEUTROPHILS # BLD AUTO: 4.19 X10(3) UL (ref 1.5–7.7)
NEUTROPHILS NFR BLD AUTO: 59.4 %
NONHDLC SERPL-MCNC: 113 MG/DL (ref ?–130)
OSMOLALITY SERPL CALC.SUM OF ELEC: 290 MOSM/KG (ref 275–295)
PLATELET # BLD AUTO: 232 10(3)UL (ref 150–450)
POTASSIUM SERPL-SCNC: 3.7 MMOL/L (ref 3.5–5.1)
PROT SERPL-MCNC: 7.3 G/DL (ref 6.4–8.2)
RBC # BLD AUTO: 4.69 X10(6)UL
SODIUM SERPL-SCNC: 140 MMOL/L (ref 136–145)
T PALLIDUM AB SER QL IA: NONREACTIVE
TRIGL SERPL-MCNC: 81 MG/DL (ref 30–149)
TSI SER-ACNC: 0.79 MIU/ML (ref 0.36–3.74)
VLDLC SERPL CALC-MCNC: 13 MG/DL (ref 0–30)
WBC # BLD AUTO: 7.1 X10(3) UL (ref 4–11)

## 2023-01-19 PROCEDURE — 36415 COLL VENOUS BLD VENIPUNCTURE: CPT

## 2023-01-19 PROCEDURE — 84443 ASSAY THYROID STIM HORMONE: CPT

## 2023-01-19 PROCEDURE — 86695 HERPES SIMPLEX TYPE 1 TEST: CPT

## 2023-01-19 PROCEDURE — 80061 LIPID PANEL: CPT

## 2023-01-19 PROCEDURE — 86696 HERPES SIMPLEX TYPE 2 TEST: CPT

## 2023-01-19 PROCEDURE — 85025 COMPLETE CBC W/AUTO DIFF WBC: CPT

## 2023-01-19 PROCEDURE — 80053 COMPREHEN METABOLIC PANEL: CPT

## 2023-01-19 PROCEDURE — 87389 HIV-1 AG W/HIV-1&-2 AB AG IA: CPT

## 2023-01-19 PROCEDURE — 83036 HEMOGLOBIN GLYCOSYLATED A1C: CPT

## 2023-01-19 PROCEDURE — 86780 TREPONEMA PALLIDUM: CPT

## 2023-01-20 LAB
HSV 1 GLYCOPROTEIN G, IGG: NEGATIVE
HSV 2 GLYCOPROTEIN G, IGG: NEGATIVE

## 2023-01-23 ENCOUNTER — PATIENT MESSAGE (OUTPATIENT)
Dept: ORTHOPEDICS CLINIC | Facility: CLINIC | Age: 33
End: 2023-01-23

## 2023-02-08 ENCOUNTER — HOSPITAL ENCOUNTER (OUTPATIENT)
Age: 33
Discharge: HOME | End: 2023-02-08
Payer: COMMERCIAL

## 2023-02-08 VITALS
SYSTOLIC BLOOD PRESSURE: 122 MMHG | TEMPERATURE: 98 F | DIASTOLIC BLOOD PRESSURE: 70 MMHG | HEART RATE: 78 BPM | OXYGEN SATURATION: 98 % | RESPIRATION RATE: 18 BRPM

## 2023-02-08 DIAGNOSIS — S13.9XXA NECK SPRAIN, INITIAL ENCOUNTER: ICD-10-CM
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
## 2023-02-08 DIAGNOSIS — S13.4XXA WHIPLASH INJURIES, INITIAL ENCOUNTER: ICD-10-CM

## 2023-02-08 PROCEDURE — 99203 OFFICE O/P NEW LOW 30 MIN: CPT

## 2023-02-08 PROCEDURE — 99213 OFFICE O/P EST LOW 20 MIN: CPT

## 2023-02-08 RX ORDER — NAPROXEN 500 MG/1
500 TABLET ORAL 2 TIMES DAILY PRN
Qty: 20 TABLET | Refills: 0 | Status: SHIPPED | OUTPATIENT
Start: 2023-02-08 | End: 2023-02-18

## 2023-02-08 RX ORDER — IBUPROFEN 600 MG/1
600 TABLET ORAL ONCE
Status: COMPLETED | OUTPATIENT
Start: 2023-02-08 | End: 2023-02-08

## 2023-02-08 RX ORDER — CYCLOBENZAPRINE HCL 10 MG
10 TABLET ORAL 3 TIMES DAILY PRN
Qty: 15 TABLET | Refills: 0 | Status: SHIPPED | OUTPATIENT
Start: 2023-02-08 | End: 2023-02-13

## 2023-02-08 RX ORDER — METHYLPREDNISOLONE 4 MG/1
TABLET ORAL
Qty: 1 EACH | Refills: 0 | Status: SHIPPED | OUTPATIENT
Start: 2023-02-08

## 2023-02-08 NOTE — DISCHARGE INSTRUCTIONS
Follow-up with your primary care doctor and/or your orthopedic physician. As discussed I have prescribed the following medications: Naproxen to be taken twice a day for 10 days. This medication is similar to other NSAIDs including: Motrin, Advil, Aleve, ibuprofen. Please do not take these NSAIDs while you are taking naproxen. Tylenol okay for breakthrough pain. Flexeril  as needed for muscle relaxation. Please be aware that this medication can cause dizziness and drowsiness. You should not work, drink, drive on this medication. Dosepak as prescribed. Apply ice and heat 4 times a day for at least 15 minutes. Go to nearest ER if you have any new or worsening symptoms of: Numbness, tingling, weakness in upper arms, sudden onset of headache, severe headache, headache worse with exertion.
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
## 2023-02-10 ENCOUNTER — HOSPITAL ENCOUNTER (OUTPATIENT)
Dept: GENERAL RADIOLOGY | Facility: HOSPITAL | Age: 33
Discharge: HOME | End: 2023-02-10
Attending: PHYSICIAN ASSISTANT
Payer: COMMERCIAL

## 2023-02-10 DIAGNOSIS — M25.852 LEFT HIP IMPINGEMENT SYNDROME: ICD-10-CM

## 2023-02-10 DIAGNOSIS — S73.192A TEAR OF LEFT ACETABULAR LABRUM, INITIAL ENCOUNTER: ICD-10-CM

## 2023-02-10 PROCEDURE — 20610 DRAIN/INJ JOINT/BURSA W/O US: CPT | Performed by: PHYSICIAN ASSISTANT

## 2023-02-10 PROCEDURE — 77002 NEEDLE LOCALIZATION BY XRAY: CPT | Performed by: PHYSICIAN ASSISTANT

## 2023-02-10 RX ORDER — TRIAMCINOLONE ACETONIDE 40 MG/ML
INJECTION, SUSPENSION INTRA-ARTICULAR; INTRAMUSCULAR
Status: COMPLETED
Start: 2023-02-10 | End: 2023-02-10

## 2023-02-10 RX ADMIN — TRIAMCINOLONE ACETONIDE 40 MG: 40 INJECTION, SUSPENSION INTRA-ARTICULAR; INTRAMUSCULAR at 11:05:00

## 2023-03-03 ENCOUNTER — OFFICE VISIT (OUTPATIENT)
Dept: FAMILY MEDICINE CLINIC | Facility: CLINIC | Age: 33
End: 2023-03-03
Payer: COMMERCIAL

## 2023-03-03 VITALS
TEMPERATURE: 98 F | DIASTOLIC BLOOD PRESSURE: 80 MMHG | HEIGHT: 62 IN | HEART RATE: 76 BPM | WEIGHT: 202 LBS | SYSTOLIC BLOOD PRESSURE: 120 MMHG | BODY MASS INDEX: 37.17 KG/M2

## 2023-03-03 DIAGNOSIS — Z00.00 ANNUAL PHYSICAL EXAM: Primary | ICD-10-CM

## 2023-03-03 PROCEDURE — 3079F DIAST BP 80-89 MM HG: CPT | Performed by: FAMILY MEDICINE

## 2023-03-03 PROCEDURE — 3008F BODY MASS INDEX DOCD: CPT | Performed by: FAMILY MEDICINE

## 2023-03-03 PROCEDURE — 99395 PREV VISIT EST AGE 18-39: CPT | Performed by: FAMILY MEDICINE

## 2023-03-03 PROCEDURE — 3074F SYST BP LT 130 MM HG: CPT | Performed by: FAMILY MEDICINE

## 2023-05-02 ENCOUNTER — PATIENT MESSAGE (OUTPATIENT)
Dept: ORTHOPEDICS CLINIC | Facility: CLINIC | Age: 33
End: 2023-05-02

## 2023-05-02 DIAGNOSIS — S73.192A TEAR OF LEFT ACETABULAR LABRUM, INITIAL ENCOUNTER: Primary | ICD-10-CM

## 2023-05-02 DIAGNOSIS — M25.852 LEFT HIP IMPINGEMENT SYNDROME: ICD-10-CM

## 2023-05-03 NOTE — TELEPHONE ENCOUNTER
Olinda Mejía RN 5/3/2023 8:28 AM CDT      ----- Message -----  From: Chel Goss  Sent: 2/0/3341 4:67 PM CDT  To: [facility] Clinical Pool  Subject: Injection     Thank you so much for this information. My last injection however was done intra-articular by a radiologist at [facility] not in [facility]. Sorry if I did not clarify that I assumed my chart would be looked at prior to a response. I planned to get an injection June 2nd and I leave for vacation on June 23. Please let me know what the next step is. Thank you!

## 2023-05-05 ENCOUNTER — OFFICE VISIT (OUTPATIENT)
Dept: OBGYN CLINIC | Facility: CLINIC | Age: 33
End: 2023-05-05
Payer: COMMERCIAL

## 2023-05-05 VITALS
SYSTOLIC BLOOD PRESSURE: 114 MMHG | DIASTOLIC BLOOD PRESSURE: 74 MMHG | WEIGHT: 200.19 LBS | BODY MASS INDEX: 37 KG/M2 | HEART RATE: 80 BPM

## 2023-05-05 DIAGNOSIS — Z97.5 BREAKTHROUGH BLEEDING ON NEXPLANON: Primary | ICD-10-CM

## 2023-05-05 DIAGNOSIS — N92.1 BREAKTHROUGH BLEEDING ON NEXPLANON: Primary | ICD-10-CM

## 2023-05-05 PROCEDURE — 3078F DIAST BP <80 MM HG: CPT | Performed by: NURSE PRACTITIONER

## 2023-05-05 PROCEDURE — 99213 OFFICE O/P EST LOW 20 MIN: CPT | Performed by: NURSE PRACTITIONER

## 2023-05-05 PROCEDURE — 3074F SYST BP LT 130 MM HG: CPT | Performed by: NURSE PRACTITIONER

## 2023-05-05 RX ORDER — MULTIVITAMIN
TABLET ORAL
COMMUNITY

## 2023-05-05 RX ORDER — OMEGA-3/DHA/EPA/FISH OIL 60 MG-90MG
CAPSULE ORAL
COMMUNITY

## 2023-05-05 NOTE — PROGRESS NOTES
Subjective:  34year old    Patient presents with:  Procedure: Nexplanon removal  BC consult    Pt scheduled appointment today for removal of Nexplanon  States that for the first couple of months she had no bleeding with the Nexplanon  Now has had spotting for over one month  Panty liners are causing irritation and not enough to fill a tampon  Notes that this is the best mentally she has felt on a contracpetive    Review of Systems:  Pertinent items are noted in the HPI.     Objective:  /74 (BP Location: Left arm, Patient Position: Sitting, Cuff Size: adult)   Pulse 80   Wt 200 lb 3.2 oz (90.8 kg)   LMP 2022 (Exact Date)     Physical Examination:  General appearance: Well dressed, well nourished in no apparent distress  Neurologic/Psychiatric: Alert and oriented to person, place and time, mood normal, affect appropriate    Assessment/Plan:    Diagnoses and all orders for this visit:    Breakthrough bleeding on Nexplanon  - discussed nexplanon and common side effects  - pt decided to defer removal today to continue to monitor bleeding  - pt is aware that she can call to reschedule procedure if she desires      Return for annual well woman exam.

## 2023-06-12 ENCOUNTER — HOSPITAL ENCOUNTER (OUTPATIENT)
Dept: GENERAL RADIOLOGY | Facility: HOSPITAL | Age: 33
Discharge: HOME | End: 2023-06-12
Attending: PHYSICIAN ASSISTANT
Payer: COMMERCIAL

## 2023-06-12 DIAGNOSIS — M25.852 LEFT HIP IMPINGEMENT SYNDROME: ICD-10-CM

## 2023-06-12 DIAGNOSIS — S73.192A TEAR OF LEFT ACETABULAR LABRUM, INITIAL ENCOUNTER: ICD-10-CM

## 2023-06-12 PROCEDURE — 20610 DRAIN/INJ JOINT/BURSA W/O US: CPT | Performed by: PHYSICIAN ASSISTANT

## 2023-06-12 PROCEDURE — 77002 NEEDLE LOCALIZATION BY XRAY: CPT | Performed by: PHYSICIAN ASSISTANT

## 2023-06-12 RX ORDER — TRIAMCINOLONE ACETONIDE 40 MG/ML
INJECTION, SUSPENSION INTRA-ARTICULAR; INTRAMUSCULAR
Status: COMPLETED
Start: 2023-06-12 | End: 2023-06-12

## 2023-07-25 ENCOUNTER — PATIENT MESSAGE (OUTPATIENT)
Dept: FAMILY MEDICINE CLINIC | Facility: CLINIC | Age: 33
End: 2023-07-25

## 2023-07-25 ENCOUNTER — APPOINTMENT (OUTPATIENT)
Dept: GENERAL RADIOLOGY | Age: 33
End: 2023-07-25
Attending: NURSE PRACTITIONER
Payer: COMMERCIAL

## 2023-07-25 ENCOUNTER — HOSPITAL ENCOUNTER (OUTPATIENT)
Age: 33
Discharge: HOME | End: 2023-07-25
Payer: COMMERCIAL

## 2023-07-25 VITALS
OXYGEN SATURATION: 100 % | RESPIRATION RATE: 16 BRPM | TEMPERATURE: 98 F | HEART RATE: 78 BPM | SYSTOLIC BLOOD PRESSURE: 128 MMHG | DIASTOLIC BLOOD PRESSURE: 80 MMHG

## 2023-07-25 DIAGNOSIS — R07.89 CHEST TIGHTNESS: Primary | ICD-10-CM

## 2023-07-25 DIAGNOSIS — J45.909 ASTHMA DUE TO ENVIRONMENTAL ALLERGIES: ICD-10-CM

## 2023-07-25 DIAGNOSIS — R09.82 POST-NASAL DRIP: ICD-10-CM

## 2023-07-25 LAB
#MXD IC: 0.7 X10ˆ3/UL (ref 0.1–1)
ATRIAL RATE: 74 BPM
BUN BLD-MCNC: 15 MG/DL (ref 7–18)
CHLORIDE BLD-SCNC: 105 MMOL/L (ref 98–112)
CO2 BLD-SCNC: 22 MMOL/L (ref 21–32)
CREAT BLD-MCNC: 0.8 MG/DL
DDIMER WHOLE BLOOD: <200 NG/ML DDU (ref ?–400)
EGFRCR SERPLBLD CKD-EPI 2021: 99 ML/MIN/1.73M2 (ref 60–?)
GLUCOSE BLD-MCNC: 95 MG/DL (ref 70–99)
HCT VFR BLD AUTO: 39.5 %
HCT VFR BLD CALC: 42 %
HGB BLD-MCNC: 12.7 G/DL
ISTAT IONIZED CALCIUM FOR CHEM 8: 1.22 MMOL/L (ref 1.12–1.32)
LYMPHOCYTES # BLD AUTO: 3 X10ˆ3/UL (ref 1–4)
LYMPHOCYTES NFR BLD AUTO: 38.7 %
MCH RBC QN AUTO: 27.3 PG (ref 26–34)
MCHC RBC AUTO-ENTMCNC: 32.2 G/DL (ref 31–37)
MCV RBC AUTO: 84.8 FL (ref 80–100)
MIXED CELL %: 8.8 %
NEUTROPHILS # BLD AUTO: 4.1 X10ˆ3/UL (ref 1.5–7.7)
NEUTROPHILS NFR BLD AUTO: 52.5 %
P AXIS: 34 DEGREES
P-R INTERVAL: 134 MS
PLATELET # BLD AUTO: 252 X10ˆ3/UL (ref 150–450)
POTASSIUM BLD-SCNC: 4 MMOL/L (ref 3.6–5.1)
Q-T INTERVAL: 384 MS
QRS DURATION: 80 MS
QTC CALCULATION (BEZET): 426 MS
R AXIS: 25 DEGREES
RBC # BLD AUTO: 4.66 X10ˆ6/UL
SODIUM BLD-SCNC: 140 MMOL/L (ref 136–145)
T AXIS: 33 DEGREES
TROPONIN I BLD-MCNC: <0.02 NG/ML
VENTRICULAR RATE: 74 BPM
WBC # BLD AUTO: 7.8 X10ˆ3/UL (ref 4–11)

## 2023-07-25 PROCEDURE — 36415 COLL VENOUS BLD VENIPUNCTURE: CPT

## 2023-07-25 PROCEDURE — 93010 ELECTROCARDIOGRAM REPORT: CPT

## 2023-07-25 PROCEDURE — 84484 ASSAY OF TROPONIN QUANT: CPT

## 2023-07-25 PROCEDURE — 99215 OFFICE O/P EST HI 40 MIN: CPT

## 2023-07-25 PROCEDURE — 71046 X-RAY EXAM CHEST 2 VIEWS: CPT | Performed by: NURSE PRACTITIONER

## 2023-07-25 PROCEDURE — 85378 FIBRIN DEGRADE SEMIQUANT: CPT | Performed by: NURSE PRACTITIONER

## 2023-07-25 PROCEDURE — 94640 AIRWAY INHALATION TREATMENT: CPT

## 2023-07-25 PROCEDURE — 93005 ELECTROCARDIOGRAM TRACING: CPT

## 2023-07-25 PROCEDURE — 85025 COMPLETE CBC W/AUTO DIFF WBC: CPT | Performed by: NURSE PRACTITIONER

## 2023-07-25 PROCEDURE — 80047 BASIC METABLC PNL IONIZED CA: CPT

## 2023-07-25 RX ORDER — MONTELUKAST SODIUM 10 MG/1
10 TABLET ORAL NIGHTLY
Qty: 30 TABLET | Refills: 0 | Status: SHIPPED | OUTPATIENT
Start: 2023-07-25 | End: 2023-08-24

## 2023-07-25 RX ORDER — FLUTICASONE PROPIONATE 50 MCG
2 SPRAY, SUSPENSION (ML) NASAL DAILY
Qty: 16 G | Refills: 0 | Status: SHIPPED | OUTPATIENT
Start: 2023-07-25

## 2023-07-25 RX ORDER — ALBUTEROL SULFATE 90 UG/1
2 AEROSOL, METERED RESPIRATORY (INHALATION) EVERY 4 HOURS PRN
Qty: 1 EACH | Refills: 0 | Status: SHIPPED | OUTPATIENT
Start: 2023-07-25 | End: 2023-08-24

## 2023-07-25 RX ORDER — IPRATROPIUM BROMIDE AND ALBUTEROL SULFATE 2.5; .5 MG/3ML; MG/3ML
3 SOLUTION RESPIRATORY (INHALATION) ONCE
Status: COMPLETED | OUTPATIENT
Start: 2023-07-25 | End: 2023-07-25

## 2023-07-25 NOTE — DISCHARGE INSTRUCTIONS
Follow-up with primary care provider 2 to 3 days you may discuss allergy testing as you do have the history of asthma and often people who have asthma have some form of an allergy whether it be indoor or outdoor. Continue use the albuterol inhaler as needed to help with any wheezing or chest tightness or shortness of breath. Start the montelukast at bedtime start the Flonase nasal spray in the morning to try to help with the postnasal drip. If you develop any new or worsening symptoms or feel any symptoms you did not initially have you may return to the [facility] or go to the nearest emergency department.

## 2023-07-25 NOTE — ED INITIAL ASSESSMENT (HPI)
Patient with 3 weeks of chest congestion and tightness   - fevers  Patient states when she stands in the shower it helps her cough up some thick yellow sputum and she feels better  Has inhaler at home

## 2023-10-18 ENCOUNTER — HOSPITAL ENCOUNTER (OUTPATIENT)
Age: 33
Discharge: HOME | End: 2023-10-18
Payer: COMMERCIAL

## 2023-10-18 VITALS
RESPIRATION RATE: 16 BRPM | TEMPERATURE: 98 F | HEART RATE: 83 BPM | SYSTOLIC BLOOD PRESSURE: 137 MMHG | OXYGEN SATURATION: 100 % | DIASTOLIC BLOOD PRESSURE: 80 MMHG

## 2023-10-18 DIAGNOSIS — R42 VERTIGO: Primary | ICD-10-CM

## 2023-10-18 LAB — B-HCG UR QL: NEGATIVE

## 2023-10-18 PROCEDURE — 99213 OFFICE O/P EST LOW 20 MIN: CPT

## 2023-10-18 PROCEDURE — 81025 URINE PREGNANCY TEST: CPT

## 2023-10-18 RX ORDER — MECLIZINE HYDROCHLORIDE 25 MG/1
25 TABLET ORAL ONCE
Status: COMPLETED | OUTPATIENT
Start: 2023-10-18 | End: 2023-10-18

## 2023-10-18 NOTE — ED INITIAL ASSESSMENT (HPI)
Patient arrives ambulatory with c/o dizziness that began this morning. Also c/o nausea. Reports she feels like her body is spinning.

## 2023-10-18 NOTE — DISCHARGE INSTRUCTIONS
You can take meclizine 25mg three times daily  as needed for vertigo    Epley or half somersault maneuver    If you develop HA, persistent vomiting, vision changes, weakness- you should be seen in the ER

## 2024-01-31 ENCOUNTER — LAB ENCOUNTER (OUTPATIENT)
Dept: LAB | Age: 34
End: 2024-01-31
Attending: INTERNAL MEDICINE
Payer: COMMERCIAL

## 2024-01-31 ENCOUNTER — OFFICE VISIT (OUTPATIENT)
Dept: INTERNAL MEDICINE CLINIC | Facility: CLINIC | Age: 34
End: 2024-01-31
Payer: COMMERCIAL

## 2024-01-31 VITALS
WEIGHT: 207 LBS | HEIGHT: 62 IN | SYSTOLIC BLOOD PRESSURE: 101 MMHG | BODY MASS INDEX: 38.09 KG/M2 | HEART RATE: 74 BPM | DIASTOLIC BLOOD PRESSURE: 70 MMHG

## 2024-01-31 DIAGNOSIS — Z00.00 PE (PHYSICAL EXAM), ROUTINE: ICD-10-CM

## 2024-01-31 DIAGNOSIS — J30.9 ALLERGIC RHINITIS, UNSPECIFIED SEASONALITY, UNSPECIFIED TRIGGER: ICD-10-CM

## 2024-01-31 DIAGNOSIS — Z00.00 PE (PHYSICAL EXAM), ROUTINE: Primary | ICD-10-CM

## 2024-01-31 DIAGNOSIS — J45.20 MILD INTERMITTENT ASTHMA WITHOUT COMPLICATION: ICD-10-CM

## 2024-01-31 LAB
ALBUMIN SERPL-MCNC: 4.4 G/DL (ref 3.2–4.8)
ALBUMIN/GLOB SERPL: 1.4 {RATIO} (ref 1–2)
ALP LIVER SERPL-CCNC: 85 U/L
ALT SERPL-CCNC: 19 U/L
ANION GAP SERPL CALC-SCNC: 7 MMOL/L (ref 0–18)
AST SERPL-CCNC: 16 U/L (ref ?–34)
BASOPHILS # BLD AUTO: 0.05 X10(3) UL (ref 0–0.2)
BASOPHILS NFR BLD AUTO: 0.7 %
BILIRUB SERPL-MCNC: 0.5 MG/DL (ref 0.3–1.2)
BILIRUB UR QL: NEGATIVE
BUN BLD-MCNC: 11 MG/DL (ref 9–23)
BUN/CREAT SERPL: 14.5 (ref 10–20)
CALCIUM BLD-MCNC: 9.5 MG/DL (ref 8.7–10.4)
CHLORIDE SERPL-SCNC: 108 MMOL/L (ref 98–112)
CHOLEST SERPL-MCNC: 175 MG/DL (ref ?–200)
CLARITY UR: CLEAR
CO2 SERPL-SCNC: 23 MMOL/L (ref 21–32)
COLOR UR: YELLOW
CREAT BLD-MCNC: 0.76 MG/DL
DEPRECATED RDW RBC AUTO: 43 FL (ref 35.1–46.3)
EGFRCR SERPLBLD CKD-EPI 2021: 105 ML/MIN/1.73M2 (ref 60–?)
EOSINOPHIL # BLD AUTO: 0.21 X10(3) UL (ref 0–0.7)
EOSINOPHIL NFR BLD AUTO: 2.8 %
ERYTHROCYTE [DISTWIDTH] IN BLOOD BY AUTOMATED COUNT: 13.8 % (ref 11–15)
FASTING PATIENT LIPID ANSWER: YES
FASTING STATUS PATIENT QL REPORTED: YES
GLOBULIN PLAS-MCNC: 3.2 G/DL (ref 2.8–4.4)
GLUCOSE BLD-MCNC: 92 MG/DL (ref 70–99)
GLUCOSE UR-MCNC: NORMAL MG/DL
HCT VFR BLD AUTO: 38.1 %
HDLC SERPL-MCNC: 40 MG/DL (ref 40–59)
HGB BLD-MCNC: 12.8 G/DL
IMM GRANULOCYTES # BLD AUTO: 0 X10(3) UL (ref 0–1)
IMM GRANULOCYTES NFR BLD: 0 %
KETONES UR-MCNC: NEGATIVE MG/DL
LDLC SERPL CALC-MCNC: 114 MG/DL (ref ?–100)
LEUKOCYTE ESTERASE UR QL STRIP.AUTO: NEGATIVE
LYMPHOCYTES # BLD AUTO: 2.44 X10(3) UL (ref 1–4)
LYMPHOCYTES NFR BLD AUTO: 33.1 %
MCH RBC QN AUTO: 28.6 PG (ref 26–34)
MCHC RBC AUTO-ENTMCNC: 33.6 G/DL (ref 31–37)
MCV RBC AUTO: 85 FL
MONOCYTES # BLD AUTO: 0.49 X10(3) UL (ref 0.1–1)
MONOCYTES NFR BLD AUTO: 6.6 %
NEUTROPHILS # BLD AUTO: 4.18 X10 (3) UL (ref 1.5–7.7)
NEUTROPHILS # BLD AUTO: 4.18 X10(3) UL (ref 1.5–7.7)
NEUTROPHILS NFR BLD AUTO: 56.8 %
NITRITE UR QL STRIP.AUTO: NEGATIVE
NONHDLC SERPL-MCNC: 135 MG/DL (ref ?–130)
OSMOLALITY SERPL CALC.SUM OF ELEC: 285 MOSM/KG (ref 275–295)
PH UR: 6.5 [PH] (ref 5–8)
PLATELET # BLD AUTO: 277 10(3)UL (ref 150–450)
POTASSIUM SERPL-SCNC: 4.2 MMOL/L (ref 3.5–5.1)
PROT SERPL-MCNC: 7.6 G/DL (ref 5.7–8.2)
PROT UR-MCNC: 30 MG/DL
RBC # BLD AUTO: 4.48 X10(6)UL
RBC #/AREA URNS AUTO: >10 /HPF
SODIUM SERPL-SCNC: 138 MMOL/L (ref 136–145)
SP GR UR STRIP: 1.03 (ref 1–1.03)
TRIGL SERPL-MCNC: 114 MG/DL (ref 30–149)
TSI SER-ACNC: 0.75 MIU/ML (ref 0.55–4.78)
UROBILINOGEN UR STRIP-ACNC: NORMAL
VLDLC SERPL CALC-MCNC: 20 MG/DL (ref 0–30)
WBC # BLD AUTO: 7.4 X10(3) UL (ref 4–11)

## 2024-01-31 PROCEDURE — 84443 ASSAY THYROID STIM HORMONE: CPT

## 2024-01-31 PROCEDURE — 85025 COMPLETE CBC W/AUTO DIFF WBC: CPT

## 2024-01-31 PROCEDURE — 80061 LIPID PANEL: CPT

## 2024-01-31 PROCEDURE — 99203 OFFICE O/P NEW LOW 30 MIN: CPT | Performed by: INTERNAL MEDICINE

## 2024-01-31 PROCEDURE — 3074F SYST BP LT 130 MM HG: CPT | Performed by: INTERNAL MEDICINE

## 2024-01-31 PROCEDURE — 3078F DIAST BP <80 MM HG: CPT | Performed by: INTERNAL MEDICINE

## 2024-01-31 PROCEDURE — 81001 URINALYSIS AUTO W/SCOPE: CPT

## 2024-01-31 PROCEDURE — 3008F BODY MASS INDEX DOCD: CPT | Performed by: INTERNAL MEDICINE

## 2024-01-31 PROCEDURE — 36415 COLL VENOUS BLD VENIPUNCTURE: CPT

## 2024-01-31 PROCEDURE — 99385 PREV VISIT NEW AGE 18-39: CPT | Performed by: INTERNAL MEDICINE

## 2024-01-31 PROCEDURE — 80053 COMPREHEN METABOLIC PANEL: CPT

## 2024-01-31 RX ORDER — FLUTICASONE PROPIONATE 50 MCG
2 SPRAY, SUSPENSION (ML) NASAL DAILY
Qty: 16 G | Refills: 1 | Status: SHIPPED | OUTPATIENT
Start: 2024-01-31

## 2024-01-31 RX ORDER — ALBUTEROL SULFATE 90 UG/1
2 AEROSOL, METERED RESPIRATORY (INHALATION) EVERY 8 HOURS PRN
Qty: 1 EACH | Refills: 1 | Status: SHIPPED | OUTPATIENT
Start: 2024-01-31

## 2024-01-31 RX ORDER — MECLIZINE HYDROCHLORIDE 25 MG/1
25 TABLET ORAL AS NEEDED
COMMUNITY

## 2024-01-31 RX ORDER — MONTELUKAST SODIUM 10 MG/1
10 TABLET ORAL NIGHTLY
Qty: 30 TABLET | Refills: 1 | Status: SHIPPED | OUTPATIENT
Start: 2024-01-31

## 2024-01-31 NOTE — PROGRESS NOTES
Subjective:   Patient ID: Angela Chavez is a 33 year old female.  Chief Complaint   Patient presents with    Physical    Asthma     Patient presents today for physical exam, patient states she has history of asthma and she is using inhaler albuterol as needed patient stated usually does not use much but lately with the weather changes or exercise she is using more often.  Patient states she has not been using in the past and is steroid inhalers was on the montelukast/Singulair that was helping at that time but patient stopped using since she did not need to use it for   long time.  S patient states she has been also experiencing some postnasal drainage , congestion nasally occasional wheeze but mostly bronchial   Pates doing well otherwise, denies chest pain, shortness of breath, dyspnea on exertion or heart palpitations also denies nausea, vomiting, diarrhea, constipation or abdominal pain. No fever, chills, or UTI symptoms.   The rest of the review of systems, see below.      Allergic Rhinitis  This is a chronic problem. The problem has been waxing and waning. Associated symptoms include congestion. Pertinent negatives include no abdominal pain, arthralgias, chest pain, chills, coughing, fatigue, fever, headaches, nausea, sore throat, urinary symptoms or vomiting. Treatments tried: zyrtec. The treatment provided significant relief.   Asthma  This is a chronic problem. The problem has been waxing and waning. Associated symptoms include rhinorrhea and wheezing (with exercise occasionaly if  weather change). Pertinent negatives include no abdominal pain, chest pain, ear pain, fever, headaches, leg swelling, sore throat or vomiting. The symptoms are aggravated by weather changes and exercise (weather change , allergies). She has tried beta agonist inhalers for the symptoms. The treatment provided significant relief. Her past medical history is significant for asthma.       History/Other:   Review of Systems    Constitutional:  Negative for chills, fatigue and fever.   HENT:  Positive for congestion, postnasal drip and rhinorrhea. Negative for ear pain and sore throat.    Eyes:  Negative for pain and redness.   Respiratory:  Positive for wheezing (with exercise occasionaly if  weather change). Negative for cough and shortness of breath.    Cardiovascular:  Negative for chest pain, palpitations and leg swelling.   Gastrointestinal:  Negative for abdominal pain, constipation, diarrhea, nausea and vomiting.   Genitourinary:  Negative for dysuria and frequency.   Musculoskeletal:  Negative for arthralgias.   Skin:  Negative for pallor.   Neurological:  Negative for dizziness and headaches.   Psychiatric/Behavioral:  Negative for confusion. The patient is not nervous/anxious.      Current Outpatient Medications   Medication Sig Dispense Refill    meclizine 25 MG Oral Tab Take 1 tablet (25 mg total) by mouth as needed.      fluticasone propionate 50 MCG/ACT Nasal Suspension 2 sprays by Nasal route daily. 1-2 weeks  than as needed 16 g 1    montelukast 10 MG Oral Tab Take 1 tablet (10 mg total) by mouth nightly. 30 tablet 1    albuterol (PROAIR HFA) 108 (90 Base) MCG/ACT Inhalation Aero Soln Inhale 2 puffs into the lungs every 8 (eight) hours as needed for Wheezing. 1 each 1    fluocinonide 0.05 % External Ointment Apply 1 Application. topically 2 (two) times daily. 60 g 2    ondansetron 4 MG Oral Tablet Dispersible Take 1 tablet (4 mg total) by mouth every 8 (eight) hours as needed for Nausea. 30 tablet 2     Allergies:No Known Allergies    Objective:   Physical Exam  Vitals and nursing note reviewed.   Constitutional:       General: She is not in acute distress.  HENT:      Head: Normocephalic and atraumatic.      Right Ear: Tympanic membrane, ear canal and external ear normal. There is no impacted cerumen.      Left Ear: Tympanic membrane, ear canal and external ear normal. There is no impacted cerumen.      Nose: Rhinorrhea  present.      Mouth/Throat:      Mouth: Mucous membranes are moist.      Pharynx: Uvula midline. No oropharyngeal exudate or posterior oropharyngeal erythema.      Comments: Postnasal drainage   Eyes:      General: No scleral icterus.        Right eye: No discharge.         Left eye: No discharge.   Cardiovascular:      Rate and Rhythm: Normal rate and regular rhythm.      Heart sounds: Normal heart sounds. No murmur heard.  Pulmonary:      Effort: Pulmonary effort is normal. No respiratory distress.      Breath sounds: Normal breath sounds. No wheezing or rales.   Abdominal:      Palpations: Abdomen is soft. There is no mass.      Tenderness: There is no abdominal tenderness. There is no right CVA tenderness or left CVA tenderness.      Comments: No hepatomegaly, no splenomegaly   Musculoskeletal:      Cervical back: Neck supple.      Right lower leg: No edema.      Left lower leg: No edema.   Lymphadenopathy:      Cervical: No cervical adenopathy.   Skin:     General: Skin is warm and dry.   Neurological:      Mental Status: She is alert and oriented to person, place, and time.   Psychiatric:         Behavior: Behavior normal.       Blood pressure 101/70, pulse 74, height 5' 2\" (1.575 m), weight 207 lb (93.9 kg), last menstrual period 11/11/2022, not currently breastfeeding.    Assessment & Plan:   1. PE (physical exam), routine    2. Allergic rhinitis, unspecified seasonality, unspecified trigger    3. Mild intermittent asthma without complication      Physical exam   Maintain a healthy diet , low saturated fat and low sugar diet  Keep good hydration  Maintain a regular activity /walking as tolerated   Complete labs as ordered,   Pap  smear - 5/22 gyne  - f/u  annual   Preventative health maintenance tests reviewed   Immunizations reviewed    utd due to asthma might consider pneumonia shot - next visit time  Patient verbalized understanding and compliance        Allergic  rynitis   Zyrtec once daily -  Flonase 2  puffs in each nostril once daily for 1 to 2 weeks and then as needed  Add Singulair montelukast 10 mg daily      Asthma   Start montelukast 10 mg   Albuterol inhaler 2 puffs every 8 hours as needed for wheezing   Patient education  Side effects and directions of medication discussed with patient. Patient verbalized understanding and compliance.          F/u  visit 4-6  weeks     Orders Placed This Encounter   Procedures    CBC With Differential With Platelet    Comp Metabolic Panel (14)    Lipid Panel    TSH W Reflex To Free T4    Urinalysis with Culture Reflex       Meds This Visit:  Requested Prescriptions     Signed Prescriptions Disp Refills    fluticasone propionate 50 MCG/ACT Nasal Suspension 16 g 1     Si sprays by Nasal route daily. 1-2 weeks  than as needed    montelukast 10 MG Oral Tab 30 tablet 1     Sig: Take 1 tablet (10 mg total) by mouth nightly.    albuterol (PROAIR HFA) 108 (90 Base) MCG/ACT Inhalation Aero Soln 1 each 1     Sig: Inhale 2 puffs into the lungs every 8 (eight) hours as needed for Wheezing.       Imaging & Referrals:  None     Alert and oriented, no focal deficits, no motor or sensory deficits.

## 2024-02-07 ENCOUNTER — TELEPHONE (OUTPATIENT)
Dept: FAMILY MEDICINE CLINIC | Facility: CLINIC | Age: 34
End: 2024-02-07

## 2024-02-10 NOTE — TELEPHONE ENCOUNTER
Zafin message sent to pt informing her that form has been faxed.  Mailed original copy to pt for her records and will send copy of form to scanning.

## 2024-03-12 ENCOUNTER — OFFICE VISIT (OUTPATIENT)
Dept: INTERNAL MEDICINE CLINIC | Facility: CLINIC | Age: 34
End: 2024-03-12
Payer: COMMERCIAL

## 2024-03-12 VITALS
OXYGEN SATURATION: 98 % | HEIGHT: 62 IN | DIASTOLIC BLOOD PRESSURE: 81 MMHG | BODY MASS INDEX: 38.83 KG/M2 | SYSTOLIC BLOOD PRESSURE: 125 MMHG | HEART RATE: 80 BPM | WEIGHT: 211 LBS

## 2024-03-12 DIAGNOSIS — J30.9 ALLERGIC RHINITIS, UNSPECIFIED SEASONALITY, UNSPECIFIED TRIGGER: Primary | ICD-10-CM

## 2024-03-12 DIAGNOSIS — K21.9 GASTROESOPHAGEAL REFLUX DISEASE WITHOUT ESOPHAGITIS: ICD-10-CM

## 2024-03-12 PROCEDURE — 99214 OFFICE O/P EST MOD 30 MIN: CPT | Performed by: INTERNAL MEDICINE

## 2024-03-12 RX ORDER — MONTELUKAST SODIUM 10 MG/1
10 TABLET ORAL NIGHTLY
Qty: 90 TABLET | Refills: 1 | Status: SHIPPED | OUTPATIENT
Start: 2024-03-12

## 2024-03-12 RX ORDER — LEVOCETIRIZINE DIHYDROCHLORIDE 5 MG/1
5 TABLET, FILM COATED ORAL EVERY EVENING
Qty: 90 TABLET | Refills: 0 | Status: SHIPPED | OUTPATIENT
Start: 2024-03-12

## 2024-03-12 NOTE — PROGRESS NOTES
Subjective:   Patient ID: Angela Chavez is a 33 year old female.  Chief Complaint   Patient presents with    Asthma     Stts that montelukast ,flonase is burning her nose    Allergies     Patient presents today  f/u allergies -since yesterday some worsening ++Postnasal drainage patient denies fever chills  Patient denies wheezing since she is on montelukast patient states she was pretty good but then yesterday she started having a postnasal drainage -that is much worsening she denies much of the runny nose or congestion but does feel some sinus pressure.  history of asthma and she is using inhaler albuterol as needed and montelukast - denies cough or wheezing   Pates doing well otherwise, denies chest pain, shortness of breath, dyspnea on exertion or heart palpitations also denies nausea, vomiting, diarrhea, constipation or abdominal pain  The rest of the review of systems, see below.      Allergic Rhinitis  This is a chronic problem. Episode onset: yesterday  - worsening. The problem has been waxing and waning. Associated symptoms include congestion. Pertinent negatives include no abdominal pain, arthralgias, chest pain, chills, coughing, fatigue, fever, headaches, nausea, sore throat, urinary symptoms or vomiting. Treatments tried: zyrtec. The treatment provided significant relief.   Asthma  This is a chronic problem. The problem has been waxing and waning. Associated symptoms include rhinorrhea. Pertinent negatives include no abdominal pain, chest pain, ear pain, fever, headaches, leg swelling, sore throat, vomiting or wheezing. The symptoms are aggravated by weather changes and exercise (weather change , allergies). She has tried beta agonist inhalers for the symptoms. The treatment provided significant relief. Her past medical history is significant for asthma.       History/Other:   Review of Systems   Constitutional:  Negative for chills, fatigue and fever.   HENT:  Positive for congestion, postnasal drip,  rhinorrhea and sinus pressure. Negative for ear pain and sore throat.    Eyes:  Negative for pain and redness.   Respiratory:  Negative for cough, shortness of breath and wheezing.    Cardiovascular:  Negative for chest pain, palpitations and leg swelling.   Gastrointestinal:  Negative for abdominal pain, constipation, diarrhea, nausea and vomiting.        Heartburns    Genitourinary:  Negative for dysuria and frequency.   Musculoskeletal:  Negative for arthralgias.   Skin:  Negative for pallor.   Neurological:  Negative for dizziness and headaches.   Psychiatric/Behavioral:  Negative for confusion. The patient is not nervous/anxious.      Current Outpatient Medications   Medication Sig Dispense Refill    levocetirizine 5 MG Oral Tab Take 1 tablet (5 mg total) by mouth every evening. 90 tablet 0    montelukast 10 MG Oral Tab Take 1 tablet (10 mg total) by mouth nightly. 90 tablet 1    meclizine 25 MG Oral Tab Take 1 tablet (25 mg total) by mouth as needed.      albuterol (PROAIR HFA) 108 (90 Base) MCG/ACT Inhalation Aero Soln Inhale 2 puffs into the lungs every 8 (eight) hours as needed for Wheezing. 1 each 1    fluocinonide 0.05 % External Ointment Apply 1 Application. topically 2 (two) times daily. 60 g 2    ondansetron 4 MG Oral Tablet Dispersible Take 1 tablet (4 mg total) by mouth every 8 (eight) hours as needed for Nausea. 30 tablet 2    fluticasone propionate 50 MCG/ACT Nasal Suspension 2 sprays by Nasal route daily. 1-2 weeks  than as needed (Patient not taking: Reported on 3/12/2024) 16 g 1     Allergies:No Known Allergies    Objective:   Physical Exam  Vitals and nursing note reviewed.   Constitutional:       General: She is not in acute distress.  HENT:      Head: Normocephalic and atraumatic.      Right Ear: Tympanic membrane, ear canal and external ear normal. There is no impacted cerumen.      Left Ear: Tympanic membrane, ear canal and external ear normal. There is no impacted cerumen.      Nose:  Rhinorrhea present.      Mouth/Throat:      Mouth: Mucous membranes are moist.      Pharynx: Uvula midline. No oropharyngeal exudate or posterior oropharyngeal erythema.      Comments: Postnasal drainage   Eyes:      General: No scleral icterus.        Right eye: No discharge.         Left eye: No discharge.   Cardiovascular:      Rate and Rhythm: Normal rate and regular rhythm.      Heart sounds: Normal heart sounds. No murmur heard.  Pulmonary:      Effort: Pulmonary effort is normal. No respiratory distress.      Breath sounds: Normal breath sounds. No wheezing or rales.   Abdominal:      Palpations: Abdomen is soft. There is no mass.      Tenderness: There is no abdominal tenderness.      Comments: No hepatomegaly, no splenomegaly   Musculoskeletal:      Cervical back: Neck supple.      Right lower leg: No edema.      Left lower leg: No edema.   Lymphadenopathy:      Cervical: No cervical adenopathy.   Skin:     General: Skin is warm and dry.   Neurological:      Mental Status: She is alert and oriented to person, place, and time.   Psychiatric:         Behavior: Behavior normal.       Blood pressure 125/81, pulse 80, height 5' 2\" (1.575 m), weight 211 lb (95.7 kg), last menstrual period 11/11/2022, SpO2 98%, not currently breastfeeding.    Assessment & Plan:   1. Allergic rhinitis, unspecified seasonality, unspecified trigger    2. Gastroesophageal reflux disease without esophagitis        Allergic  rynitis   Patient can stop his Zyrtec and trial of levocetirizine 5 mg every evening  Flonase in is on hold patient-Developed some burning sensation in the nostrils so she will try to avoid and use the nasal saline  On regular basis twice daily   Continue with montelukast 10 mg daily   If any persistent worsening symptoms patient to let me know she might need to see me    Consider referral to see allergist in the future if the allergies are getting worse       Asthma   montelukast 10 mg   Albuterol inhaler 2 puffs  every 8 hours as needed for wheezing   Patient education  Patient tolerates montelukast daily continue with montelukast 10 mg  Daily   Patient verbalized understanding and compliance.    Stable cpm       Gerd  Patient advised to avoid spicy food, coffee, tea, caffeinated drinks, alcohol, acidic food/juices  Advised to avoid NSAID's - Aspirin-based medications  Advised to avoid wearing  tight clothes   Advised to elevate the head of the bed   Avoid eating at least 3 hours before bedtime   Counseling on ideal weight/BMI  Take Omeprazole 20 mg 1-2  tab - PPIs qd in the morning 30-60 minutes before breakfast always on empty stomach Side effects and directions of medication discussed with patient. Patient verbalized understanding and compliance.    Pt state has medications at home    F/u  visit 4-6  weeks     No orders of the defined types were placed in this encounter.      Meds This Visit:  Requested Prescriptions     Signed Prescriptions Disp Refills    levocetirizine 5 MG Oral Tab 90 tablet 0     Sig: Take 1 tablet (5 mg total) by mouth every evening.    montelukast 10 MG Oral Tab 90 tablet 1     Sig: Take 1 tablet (10 mg total) by mouth nightly.       Imaging & Referrals:  None

## 2024-05-24 ENCOUNTER — HOSPITAL ENCOUNTER (OUTPATIENT)
Age: 34
Discharge: HOME | End: 2024-05-24

## 2024-05-24 VITALS
OXYGEN SATURATION: 98 % | SYSTOLIC BLOOD PRESSURE: 126 MMHG | TEMPERATURE: 98 F | HEART RATE: 86 BPM | RESPIRATION RATE: 18 BRPM | DIASTOLIC BLOOD PRESSURE: 76 MMHG

## 2024-05-24 DIAGNOSIS — R59.0 CERVICAL LYMPHADENOPATHY: Primary | ICD-10-CM

## 2024-05-24 LAB — S PYO AG THROAT QL IA.RAPID: NEGATIVE

## 2024-05-24 PROCEDURE — 99212 OFFICE O/P EST SF 10 MIN: CPT

## 2024-05-24 PROCEDURE — 87651 STREP A DNA AMP PROBE: CPT

## 2024-05-24 NOTE — ED PROVIDER NOTES
Patient Seen in: [facility] [place]      History     Chief Complaint   Patient presents with    Lymph Node     Stated Complaint: Lump    Subjective:   HPI    34yo female pw tenderness to left lateral neck noticed yesterday evening.  Pain upon palpation.  Is noticed increased sinus drainage, and mild sore throat.  Denies f/c/n/v/d. No recent sick exposures. Denies recent infections/covid exposures.  No recent weight loss/weight gain.  No increase in swelling of the neck.  No shortness of breath/increased work of breathing. Is currently being treated for seasonal allergies and asthma.  Notes postnasal drip at night increased inflammation in back of throat related to postnasal drip.      Objective:   Past Medical History:    Anxiety state, unspecified    Fibroids    Human papilloma virus infection    Pap smear for cervical cancer screening    wnl              Past Surgical History:   Procedure Laterality Date    Colposcopy, cervix w/upper adjacent vagina; w/biopsy(s), cervix  2013    Other surgical history  2007    LEFT BREAST CYST REMOVED    Other surgical history  2013    [facility]                Social History     Socioeconomic History    Marital status: Single   Tobacco Use    Smoking status: Never    Smokeless tobacco: Never   Vaping Use    Vaping status: Never Used   Substance and Sexual Activity    Alcohol use: Yes     Alcohol/week: 0.0 standard drinks of alcohol     Comment: ONCE EVERY 2-3 MONTHS    Drug use: No    Sexual activity: Yes     Partners: Male     Birth control/protection: Condom   Other Topics Concern    Caffeine Concern Yes     Comment: soda    Exercise No    Seat Belt Yes    History of tanning Yes    Reaction to local anesthetic No    Pt has a pacemaker No    Pt has a defibrillator No              Review of Systems    Positive for stated complaint: Lump  Other systems are as noted in HPI.  Constitutional and vital signs reviewed.      All other systems reviewed and negative except as noted  above.    Physical Exam     ED Triage Vitals [05/24/24 1059]   /76   Pulse 86   Resp 18   Temp 97.5 °F (36.4 °C)   Temp src Temporal   SpO2 98 %   O2 Device None (Room air)       Current Vitals:   Vital Signs  BP: 126/76  Pulse: 86  Resp: 18  Temp: 97.5 °F (36.4 °C)  Temp src: Temporal    Oxygen Therapy  SpO2: 98 %  O2 Device: None (Room air)            Physical Exam  Vitals and nursing note reviewed.   Constitutional:       General: She is not in acute distress.     Appearance: She is not toxic-appearing.   HENT:      Head: Normocephalic.      Nose: Nose normal. No congestion or rhinorrhea.      Mouth/Throat:      Mouth: Mucous membranes are moist.   Pulmonary:      Effort: Pulmonary effort is normal. No respiratory distress.   Abdominal:      General: Abdomen is flat.   Musculoskeletal:         General: Normal range of motion.      Cervical back: Normal range of motion.   Lymphadenopathy:      Cervical: Cervical adenopathy present.      Right cervical: No superficial, deep or posterior cervical adenopathy.     Left cervical: Superficial cervical adenopathy present. No deep or posterior cervical adenopathy.      Upper Body:      Left upper body: No supraclavicular adenopathy.   Skin:     General: Skin is warm and dry.      Capillary Refill: Capillary refill takes less than 2 seconds.   Neurological:      General: No focal deficit present.      Mental Status: She is alert.               ED Course     Labs Reviewed   RAPID STREP A - Normal          ED Course as of 05/24/24 1344  ------------------------------------------------------------  Time: 05/24 1133  Value: POCT RAPID STREP: Negative  Comment: (Reviewed)              [facility]                                       Medical Decision Making  33-year-old female presents with palpable lymph nodes of left cervical chain, that patient notes are mildly tender.  2 lymph nodes noted less than 2 cm in diameter, are tender to patient upon palpation but are mobile and  soft.  Doubt cancer clinically.  Discussed may need to follow-up with PCP if these continue may be related to increased drainage from ear nose and throat area.  May continue Singulair, take Flonase daily if this is aggravating her nose or making her nose burn she may need to change to Sensa mist or Nasacort.  Continue with allergy medicine.  If continues may also need to follow-up with pulmonology versus ENT.  Does not describe any periods of apnea.  But does report increased snoring per boyfriend.    Physical exam remained stable over serial reexaminations as previously documented.      I have discussed with the patient the results of tests, differential diagnosis, and warning signs and symptoms that should prompt immediate return.  The patient understands these instructions and agrees to the follow-up plan provided.  There are no barriers to learning.   Appropriate f/u given.  Patient agrees to return for any concerns/problems/complications.          Disposition and Plan     Clinical Impression:  1. Cervical lymphadenopathy         Disposition:  Discharge  5/24/2024 12:32 pm    Follow-up:  Armando Arenas MD  [address] [place] [address]  [phone]                Medications Prescribed:  Discharge Medication List as of 5/24/2024 12:38 PM

## 2024-05-24 NOTE — DISCHARGE INSTRUCTIONS
Keep an eye on your lymphnodes, if they change in shape, size significantly or become more painful.     Follow up with your primary care doctor.

## 2024-05-24 NOTE — ED INITIAL ASSESSMENT (HPI)
Patient with intermittent sinus symptoms for the last 1-2 weeks.  Noted lumps x 2 to her left neck yesterday evening.  States they are mildly tender with palpation.  + mild sore throat.  Denies fevers, denies unintentional weight loss.

## 2024-08-08 ENCOUNTER — HOSPITAL ENCOUNTER (OUTPATIENT)
Age: 34
Discharge: ANOTHER HOSPITAL | End: 2024-08-08
Payer: COMMERCIAL

## 2024-08-08 ENCOUNTER — HOSPITAL ENCOUNTER (EMERGENCY)
Facility: HOSPITAL | Age: 34
Discharge: HOME | End: 2024-08-08
Attending: EMERGENCY MEDICINE
Payer: COMMERCIAL

## 2024-08-08 ENCOUNTER — APPOINTMENT (OUTPATIENT)
Dept: CT IMAGING | Facility: HOSPITAL | Age: 34
End: 2024-08-08
Attending: EMERGENCY MEDICINE
Payer: COMMERCIAL

## 2024-08-08 VITALS
OXYGEN SATURATION: 99 % | SYSTOLIC BLOOD PRESSURE: 111 MMHG | TEMPERATURE: 98 F | HEART RATE: 72 BPM | DIASTOLIC BLOOD PRESSURE: 65 MMHG | RESPIRATION RATE: 20 BRPM

## 2024-08-08 VITALS
SYSTOLIC BLOOD PRESSURE: 121 MMHG | TEMPERATURE: 98 F | RESPIRATION RATE: 18 BRPM | OXYGEN SATURATION: 98 % | HEART RATE: 74 BPM | DIASTOLIC BLOOD PRESSURE: 74 MMHG

## 2024-08-08 DIAGNOSIS — R51.9 NONINTRACTABLE HEADACHE, UNSPECIFIED CHRONICITY PATTERN, UNSPECIFIED HEADACHE TYPE: ICD-10-CM

## 2024-08-08 DIAGNOSIS — H53.9 VISUAL CHANGES: Primary | ICD-10-CM

## 2024-08-08 DIAGNOSIS — G44.209 TENSION HEADACHE: Primary | ICD-10-CM

## 2024-08-08 DIAGNOSIS — M54.2 CERVICALGIA: ICD-10-CM

## 2024-08-08 DIAGNOSIS — R42 DIZZINESS: ICD-10-CM

## 2024-08-08 LAB
ALBUMIN SERPL-MCNC: 4.4 G/DL (ref 3.2–4.8)
ALBUMIN/GLOB SERPL: 1.4 {RATIO} (ref 1–2)
ALP LIVER SERPL-CCNC: 94 U/L
ALT SERPL-CCNC: 19 U/L
ANION GAP SERPL CALC-SCNC: 6 MMOL/L (ref 0–18)
AST SERPL-CCNC: 17 U/L (ref ?–34)
B-HCG UR QL: NEGATIVE
BASOPHILS # BLD AUTO: 0.04 X10(3) UL (ref 0–0.2)
BASOPHILS NFR BLD AUTO: 0.4 %
BILIRUB SERPL-MCNC: 0.6 MG/DL (ref 0.3–1.2)
BILIRUB UR QL: NEGATIVE
BUN BLD-MCNC: 11 MG/DL (ref 9–23)
BUN/CREAT SERPL: 13.1 (ref 10–20)
CALCIUM BLD-MCNC: 9.7 MG/DL (ref 8.7–10.4)
CHLORIDE SERPL-SCNC: 109 MMOL/L (ref 98–112)
CLARITY UR: CLEAR
CO2 SERPL-SCNC: 26 MMOL/L (ref 21–32)
COLOR UR: YELLOW
CREAT BLD-MCNC: 0.84 MG/DL
DEPRECATED RDW RBC AUTO: 42.1 FL (ref 35.1–46.3)
EGFRCR SERPLBLD CKD-EPI 2021: 93 ML/MIN/1.73M2 (ref 60–?)
EOSINOPHIL # BLD AUTO: 0.11 X10(3) UL (ref 0–0.7)
EOSINOPHIL NFR BLD AUTO: 1.1 %
ERYTHROCYTE [DISTWIDTH] IN BLOOD BY AUTOMATED COUNT: 13.7 % (ref 11–15)
GLOBULIN PLAS-MCNC: 3.1 G/DL (ref 2–3.5)
GLUCOSE BLD-MCNC: 106 MG/DL (ref 70–99)
GLUCOSE BLDC GLUCOMTR-MCNC: 122 MG/DL (ref 70–99)
GLUCOSE UR-MCNC: NORMAL MG/DL
HCT VFR BLD AUTO: 38.1 %
HGB BLD-MCNC: 13 G/DL
IMM GRANULOCYTES # BLD AUTO: 0.03 X10(3) UL (ref 0–1)
IMM GRANULOCYTES NFR BLD: 0.3 %
KETONES UR-MCNC: NEGATIVE MG/DL
LEUKOCYTE ESTERASE UR QL STRIP.AUTO: NEGATIVE
LYMPHOCYTES # BLD AUTO: 1.72 X10(3) UL (ref 1–4)
LYMPHOCYTES NFR BLD AUTO: 17 %
MCH RBC QN AUTO: 28.6 PG (ref 26–34)
MCHC RBC AUTO-ENTMCNC: 34.1 G/DL (ref 31–37)
MCV RBC AUTO: 83.9 FL
MONOCYTES # BLD AUTO: 0.61 X10(3) UL (ref 0.1–1)
MONOCYTES NFR BLD AUTO: 6 %
NEUTROPHILS # BLD AUTO: 7.59 X10 (3) UL (ref 1.5–7.7)
NEUTROPHILS # BLD AUTO: 7.59 X10(3) UL (ref 1.5–7.7)
NEUTROPHILS NFR BLD AUTO: 75.2 %
NITRITE UR QL STRIP.AUTO: NEGATIVE
OSMOLALITY SERPL CALC.SUM OF ELEC: 292 MOSM/KG (ref 275–295)
PH UR: 7 [PH] (ref 5–8)
PLATELET # BLD AUTO: 255 10(3)UL (ref 150–450)
POTASSIUM SERPL-SCNC: 3.8 MMOL/L (ref 3.5–5.1)
PROT SERPL-MCNC: 7.5 G/DL (ref 5.7–8.2)
PROT UR-MCNC: 30 MG/DL
RBC # BLD AUTO: 4.54 X10(6)UL
RBC #/AREA URNS AUTO: >10 /HPF
SARS-COV-2 RNA RESP QL NAA+PROBE: NOT DETECTED
SODIUM SERPL-SCNC: 141 MMOL/L (ref 136–145)
SP GR UR STRIP: >1.03 (ref 1–1.03)
UROBILINOGEN UR STRIP-ACNC: 2
WBC # BLD AUTO: 10.1 X10(3) UL (ref 4–11)

## 2024-08-08 PROCEDURE — 80053 COMPREHEN METABOLIC PANEL: CPT

## 2024-08-08 PROCEDURE — 80053 COMPREHEN METABOLIC PANEL: CPT | Performed by: EMERGENCY MEDICINE

## 2024-08-08 PROCEDURE — 85025 COMPLETE CBC W/AUTO DIFF WBC: CPT

## 2024-08-08 PROCEDURE — 96374 THER/PROPH/DIAG INJ IV PUSH: CPT

## 2024-08-08 PROCEDURE — 82962 GLUCOSE BLOOD TEST: CPT

## 2024-08-08 PROCEDURE — 72125 CT NECK SPINE W/O DYE: CPT | Performed by: EMERGENCY MEDICINE

## 2024-08-08 PROCEDURE — 99285 EMERGENCY DEPT VISIT HI MDM: CPT

## 2024-08-08 PROCEDURE — 85025 COMPLETE CBC W/AUTO DIFF WBC: CPT | Performed by: EMERGENCY MEDICINE

## 2024-08-08 PROCEDURE — 96375 TX/PRO/DX INJ NEW DRUG ADDON: CPT

## 2024-08-08 PROCEDURE — 99284 EMERGENCY DEPT VISIT MOD MDM: CPT

## 2024-08-08 PROCEDURE — 81025 URINE PREGNANCY TEST: CPT

## 2024-08-08 PROCEDURE — 70450 CT HEAD/BRAIN W/O DYE: CPT | Performed by: EMERGENCY MEDICINE

## 2024-08-08 PROCEDURE — 81001 URINALYSIS AUTO W/SCOPE: CPT | Performed by: EMERGENCY MEDICINE

## 2024-08-08 PROCEDURE — 99213 OFFICE O/P EST LOW 20 MIN: CPT

## 2024-08-08 RX ORDER — KETOROLAC TROMETHAMINE 10 MG/1
10 TABLET, FILM COATED ORAL EVERY 6 HOURS PRN
Qty: 14 TABLET | Refills: 0 | Status: SHIPPED | OUTPATIENT
Start: 2024-08-08 | End: 2024-08-15

## 2024-08-08 RX ORDER — METOCLOPRAMIDE HYDROCHLORIDE 5 MG/ML
10 INJECTION INTRAMUSCULAR; INTRAVENOUS ONCE
Status: COMPLETED | OUTPATIENT
Start: 2024-08-08 | End: 2024-08-08

## 2024-08-08 RX ORDER — KETOROLAC TROMETHAMINE 30 MG/ML
30 INJECTION, SOLUTION INTRAMUSCULAR; INTRAVENOUS ONCE
Status: COMPLETED | OUTPATIENT
Start: 2024-08-08 | End: 2024-08-08

## 2024-08-08 RX ORDER — DIAZEPAM 5 MG/1
5 TABLET ORAL 3 TIMES DAILY PRN
Qty: 12 TABLET | Refills: 0 | Status: SHIPPED | OUTPATIENT
Start: 2024-08-08 | End: 2024-08-15

## 2024-08-08 RX ORDER — DIAZEPAM 5 MG/1
5 TABLET ORAL ONCE
Status: COMPLETED | OUTPATIENT
Start: 2024-08-08 | End: 2024-08-08
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
## 2024-08-08 NOTE — ED INITIAL ASSESSMENT (HPI)
Pt c/o pressure in head since this morning. Sent over from [facility] for CTA. + photosensitivity, pain, nausea. BEFAST (-), speech clear.    Took zofran ODT @ 10, 1g tylenol.

## 2024-08-08 NOTE — ED INITIAL ASSESSMENT (HPI)
Onset of headache and neck pain after \"looking up\" last night. Took Tylenol with some relief but headache remains present with dizziness and blurred vision. Pain to right lateral neck and top of head. Some nausea. Took Zofran and Flonase.

## 2024-08-08 NOTE — ED PROVIDER NOTES
Patient Seen in: [facility] Emergency Department    History     Chief Complaint   Patient presents with    Headache       HPI    34-year-old female presents to ER with complaints of headache.  Patient was sent from the urgent care for further evaluation.  Patient with past milligrams for anxiety and migraine headaches.  Patient states the pain started yesterday.  Patient states the pain radiates from her neck all the way to the top of her head.  Patient did note that she was outside staring at the cain for UFOs.\"  Patient states her head is in extension for long period of time and that is when her pain started.  Patient is taken Tylenol and Zofran for the nausea.  Patient went to urgent care sent to the ER for further evaluation.  Patient without any neurological deficits.    History reviewed.   Past Medical History:    Anxiety state, unspecified    Fibroids    Human papilloma virus infection    Pap smear for cervical cancer screening    wnl       History reviewed.   Past Surgical History:   Procedure Laterality Date    Colposcopy, cervix w/upper adjacent vagina; w/biopsy(s), cervix  2013    Other surgical history  2007    LEFT BREAST CYST REMOVED    Other surgical history  2013    Lasik         Medications :  (Not in a hospital admission)       Family History   Problem Relation Age of Onset    Heart Disease Maternal Grandfather     Diabetes Maternal Grandfather     Hypertension Father     Obesity Father     No Known Problems Mother     Diabetes Maternal Grandmother     Heart Disorder Maternal Grandmother     Hypertension Maternal Grandmother     Cancer Maternal Grandmother 65        colon cancer    Diabetes Paternal Grandmother     Diabetes Paternal Grandfather     Stroke Neg        Smoking Status:   Social History     Socioeconomic History    Marital status: Single   Tobacco Use    Smoking status: Never    Smokeless tobacco: Never   Vaping Use    Vaping status: Never Used   Substance and Sexual Activity     Alcohol use: Yes     Alcohol/week: 0.0 standard drinks of alcohol     Comment: ONCE EVERY 2-3 MONTHS    Drug use: No    Sexual activity: Yes     Partners: Male     Birth control/protection: Condom   Other Topics Concern    Caffeine Concern Yes     Comment: soda    Exercise No    Seat Belt Yes    History of tanning Yes    Reaction to local anesthetic No    Pt has a pacemaker No    Pt has a defibrillator No       ROS  All pertinent positives for the review of systems are mentioned in the HPI  All other organ systems are reviewed and are negative.    Constitutional and vital signs reviewed.      Social History and Family History elements reviewed from today, pertinent positives to the presenting problem noted.    Physical Exam     ED Triage Vitals   BP 08/08/24 1315 119/77   Pulse 08/08/24 1311 74   Resp 08/08/24 1311 18   Temp 08/08/24 1311 98.1 °F (36.7 °C)   Temp src --    SpO2 08/08/24 1311 100 %   O2 Device 08/08/24 1512 None (Room air)       All measures to prevent infection transmission during my interaction with the patient were taken. The patient was already wearing a droplet mask on my arrival to the room. Personal protective equipment including droplet mask, eye protection, and gloves were worn throughout the duration of the exam.  Handwashing was performed prior to and after the exam.  Stethoscope and any equipment used during my examination was cleaned with super sani-cloth germicidal wipes following the exam.     Physical Exam  Vitals and nursing note reviewed.   Constitutional:       Appearance: She is well-developed.   HENT:      Head: Normocephalic and atraumatic.      Right Ear: External ear normal.      Left Ear: External ear normal.      Nose: Nose normal.   Eyes:      Conjunctiva/sclera: Conjunctivae normal.      Pupils: Pupils are equal, round, and reactive to light.   Neck:      Comments: Positive tenderness to bilateral occiput of the scalp.  Cardiovascular:      Rate and Rhythm: Normal rate and  regular rhythm.      Heart sounds: Normal heart sounds.   Pulmonary:      Effort: Pulmonary effort is normal.      Breath sounds: Normal breath sounds.   Abdominal:      General: Bowel sounds are normal.      Palpations: Abdomen is soft.   Musculoskeletal:         General: Normal range of motion.      Cervical back: Normal range of motion and neck supple.   Skin:     General: Skin is warm and dry.   Neurological:      General: No focal deficit present.      Mental Status: She is alert and oriented to person, place, and time. Mental status is at baseline.      Cranial Nerves: No cranial nerve deficit.      Sensory: No sensory deficit.      Motor: No weakness.      Coordination: Coordination normal.      Deep Tendon Reflexes: Reflexes are normal and symmetric.   Psychiatric:         Behavior: Behavior normal.         Thought Content: Thought content normal.         Judgment: Judgment normal.         ED Course        Labs Reviewed   COMP METABOLIC PANEL (14) - Abnormal; Notable for the following components:       Result Value    Glucose 106 (*)     All other components within normal limits   URINALYSIS WITH CULTURE REFLEX - Abnormal; Notable for the following components:    Spec Gravity >1.030 (*)     Blood Urine 3+ (*)     Protein Urine 30 (*)     Urobilinogen Urine 2 (*)     RBC Urine >10 (*)     Bacteria Urine 1+ (*)     Squamous Epi. Cells Few (*)     All other components within normal limits   POCT PREGNANCY URINE - Normal   CBC WITH DIFFERENTIAL WITH PLATELET         Imaging Results Available and Reviewed while in ED: CT SPINE CERVICAL (CPT=72125)    Result Date: 8/8/2024  CONCLUSION: No acute fracture or dislocation within the cervical spine    Dictated by (CST): Bryant Plummer MD on 8/08/2024 at 4:15 PM     Finalized by (CST): Bryant Plummer MD on 8/08/2024 at 4:20 PM          CT BRAIN OR HEAD (CPT=70450)    Result Date: 8/8/2024  CONCLUSION:  1. Normal noncontrast CT brain.    Dictated by (CST): Fawad Duggan  MD ELISHA on 8/08/2024 at 4:09 PM     Finalized by (CST): Fawad Duggan MD on 8/08/2024 at 4:12 PM         ED Medications Administered:   Medications   metoclopramide (Reglan) 5 mg/mL injection 10 mg (10 mg Intravenous Given 8/8/24 1530)   ketorolac (Toradol) 30 MG/ML injection 30 mg (30 mg Intravenous Given 8/8/24 1530)   diazePAM (Valium) tab 5 mg (5 mg Oral Given 8/8/24 1530)         MDM     Vitals:    08/08/24 1311 08/08/24 1315 08/08/24 1512   BP:  119/77 127/84   Pulse: 74  82   Resp: 18  18   Temp: 98.1 °F (36.7 °C)     SpO2: 100%  98%     *I personally reviewed and interpreted all ED vitals.  I also personally reviewed all labs and imaging if ordered    Pulse Ox: 98%, Room air, Normal     Monitor Interpretation:   normal sinus rhythm    Differential Diagnosis/ Diagnostic Considerations: Migraine headache, tension headache, neck strain,    Medical Record Review: I personally reviewed available prior medical records for any recent pertinent discharge summaries, testing, and procedures and reviewed those reports.    Complicating Factors: The patient already has does not have any pertinent problems on file. to contribute to the complexity of this ED evaluation.    Medical Decision Making  34-year-old female presents ER with complaints of headache rating from her neck to her head.  Patient likely neurological deficits.  Patient CT head and cervical spine negative.  Patient given Reglan Toradol and Valium in the ER states her headache had completely resolved.  Patient likely with tension headache from staying in extension for a long period of time last night while staring at the cain for UFOs.  Patient okay to be discharged home and given a prescription for Toradol and Valium.  Patient's family at bedside made aware of the discharge planning and disposition.    Problems Addressed:  Tension headache: acute illness or injury    Amount and/or Complexity of Data Reviewed  External Data Reviewed: notes.     Details:  Urgent care notes reviewed.  Patient was sent to the ER for evaluation of her headache and possible CT angio of her head and neck.  Labs: ordered. Decision-making details documented in ED Course.  Radiology: ordered and independent interpretation performed. Decision-making details documented in ED Course.     Details: CT of the brain interpreted by myself shows no acute intracranial process.    Risk  Prescription drug management.        Condition upon leaving the department: Stable    Disposition and Plan     Clinical Impression:  1. Tension headache        Disposition:  Discharge    Follow-up:  Maryellen Gallardo DO  [address] [address]  [phone]    Schedule an appointment as soon as possible for a visit  If symptoms worsen      Medications Prescribed:  Current Discharge Medication List        START taking these medications    Details   diazePAM 5 MG Oral Tab Take 1 tablet (5 mg total) by mouth 3 (three) times daily as needed.  Qty: 12 tablet, Refills: 0    Associated Diagnoses: Tension headache      Ketorolac Tromethamine 10 MG Oral Tab Take 1 tablet (10 mg total) by mouth every 6 (six) hours as needed.  Qty: 14 tablet, Refills: 0    Associated Diagnoses: Tension headache

## 2024-08-21 NOTE — TELEPHONE ENCOUNTER
C    levocetirizine 5 MG Oral Tab, Take 1 tablet (5 mg total) by mouth every evening., Disp: 90 tablet, Rfl: 0

## 2024-08-22 RX ORDER — LEVOCETIRIZINE DIHYDROCHLORIDE 5 MG/1
5 TABLET, FILM COATED ORAL EVERY EVENING
Qty: 90 TABLET | Refills: 3 | Status: SHIPPED | OUTPATIENT
Start: 2024-08-22

## 2024-08-22 NOTE — TELEPHONE ENCOUNTER
Refill Per Protocol     Requested Prescriptions   Pending Prescriptions Disp Refills    levocetirizine 5 MG Oral Tab 90 tablet 0     Sig: Take 1 tablet (5 mg total) by mouth every evening.       Allergy Medication Protocol Passed - 8/21/2024  8:35 PM        Passed - In person appointment or virtual visit in the past 12 mos or appointment in next 3 mos     Recent Outpatient Visits              5 months ago Allergic rhinitis, unspecified seasonality, unspecified trigger    [facility], [place] Vukomanovic, Emela, MD    Office Visit    6 months ago PE (physical exam), routine    [facility], [place] Vukomanovic, Emela, MD    Office Visit    1 year ago Breakthrough bleeding on Nexplanon    [facility], [place] [address], [place] - OB/GYN Marci Hidalgo, SHERMAN    Office Visit    1 year ago Annual physical exam    [place] [address], Brittany Rush MD    Office Visit    1 year ago Tear of left acetabular labrum, initial encounter    [facility], [place] Krippinger, Shannon M, PA-C    Office Visit                               Recent Outpatient Visits              5 months ago Allergic rhinitis, unspecified seasonality, unspecified trigger    [facility], [place] Vukomanovic, Emela, MD    Office Visit    6 months ago PE (physical exam), routine    [facility], [place] Vukomanovic, Emela, MD    Office Visit    1 year ago Breakthrough bleeding on Nexplanon    [facility], [facility], [place] - OB/Marci Gregg APRN    Office Visit    1 year ago Annual physical exam    [place] [address], Brittany Rush MD    Office Visit    1 year ago Tear of left acetabular labrum, initial encounter    [facility], [place] Krippinger, Shannon M, PA-C     Office Visit

## 2024-11-08 ENCOUNTER — TELEPHONE (OUTPATIENT)
Dept: ORTHOPEDICS CLINIC | Facility: CLINIC | Age: 34
End: 2024-11-08

## 2024-11-08 DIAGNOSIS — M25.552 LEFT HIP PAIN: Primary | ICD-10-CM

## 2024-11-08 NOTE — TELEPHONE ENCOUNTER
XR ordered per ortho protocol. XR scheduled and patient was notified via [employer] to let them know that they should arrive 15-20 minutes early, in order for them to complete imaging.

## 2024-11-11 ENCOUNTER — HOSPITAL ENCOUNTER (OUTPATIENT)
Dept: GENERAL RADIOLOGY | Age: 34
Discharge: HOME | End: 2024-11-11
Attending: PHYSICIAN ASSISTANT
Payer: COMMERCIAL

## 2024-11-11 ENCOUNTER — OFFICE VISIT (OUTPATIENT)
Dept: ORTHOPEDICS CLINIC | Facility: CLINIC | Age: 34
End: 2024-11-11
Payer: COMMERCIAL

## 2024-11-11 VITALS — HEIGHT: 61.5 IN | BODY MASS INDEX: 37.28 KG/M2 | WEIGHT: 200 LBS

## 2024-11-11 DIAGNOSIS — M25.852 LEFT HIP IMPINGEMENT SYNDROME: ICD-10-CM

## 2024-11-11 DIAGNOSIS — M25.552 LEFT HIP PAIN: Primary | ICD-10-CM

## 2024-11-11 DIAGNOSIS — S73.192D TEAR OF LEFT ACETABULAR LABRUM, SUBSEQUENT ENCOUNTER: ICD-10-CM

## 2024-11-11 DIAGNOSIS — M25.552 LEFT HIP PAIN: ICD-10-CM

## 2024-11-11 PROCEDURE — 73502 X-RAY EXAM HIP UNI 2-3 VIEWS: CPT | Performed by: PHYSICIAN ASSISTANT

## 2024-11-11 RX ORDER — MELOXICAM 15 MG/1
15 TABLET ORAL DAILY
Qty: 14 TABLET | Refills: 0 | Status: SHIPPED | OUTPATIENT
Start: 2024-11-11

## 2024-11-11 NOTE — PROGRESS NOTES
[facility] Progress Note      Chief Complaint:  Left hip pain      Subjective: Angela Chavez is a 34 year old female who is here for reevaluation of her left hip.  She has a left hip labral tear and impingement syndrome and was seen approximately 2 years ago at which time she was advised to consider cortisone injection.  She did undergo 2 cortisone injections, one in February 2023 and another one in June 2023 that were temporarily beneficial.  Pain continues to be localized to the groin and worse with any hip flexion activities.  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  Occasionally when the pain is bad enough she will take Toradol.  She denies any radiculopathy, numbness or tingling.  She is thinking about proceeding with surgical intervention, possibly in January.  She is here for further evaluation.      Objective: She is a pleasant 34-year-old female.  Ambulates with a minimally antalgic gait.  Exam of the left hip and lower extremity reveals that she has no pain with logroll.  She has discomfort with hip flexion and internal rotation.  No pain with external rotation.  She is mildly tender to palpation over the greater trochanteric bursa.  Sensation is present to light touch.    Imaging: X-rays of the left hip were independently read and radiologically reviewed.  They show:  XR HIP W OR WO PELVIS 2 OR 3 VIEWS, LEFT (CPT=73502)    Result Date: 11/11/2024  CONCLUSION: Slight symmetric degenerative changes within both hips.  No acute or destructive bony process is seen.    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  She has tried two intra-articular cortisone injections with temporary relief of the pain has not responded  to conservative treatment.  At this time, I advised going ahead with a repeat MRI scan of the hip in preparation for surgical intervention.  This will be with and without contrast due to patient preference.  Once the MRI is completed, I recommend follow-up with Dr. Castaneda for surgical consultation and discussion of next steps.  She will continue with conservative management in the meantime and will follow-up with me sooner with questions, concerns, or worsening symptoms in the interim.        Elma Muñoz PA-C  Orthopedic Surgery   [address] [place] [id number]   t: [phone]  f: [phone]           This document was partially prepared using Dragon Medical voice recognition software.  Although every attempt is made to correct errors during dictation, discrepancies may still exist. Please contact me with any questions or clarifications.

## 2024-11-15 ENCOUNTER — OFFICE VISIT (OUTPATIENT)
Dept: OBGYN CLINIC | Facility: CLINIC | Age: 34
End: 2024-11-15
Payer: COMMERCIAL

## 2024-11-15 VITALS
DIASTOLIC BLOOD PRESSURE: 80 MMHG | HEIGHT: 61.5 IN | WEIGHT: 219.81 LBS | BODY MASS INDEX: 40.97 KG/M2 | SYSTOLIC BLOOD PRESSURE: 116 MMHG | HEART RATE: 69 BPM

## 2024-11-15 DIAGNOSIS — Z01.419 WELL WOMAN EXAM WITH ROUTINE GYNECOLOGICAL EXAM: Primary | ICD-10-CM

## 2024-11-15 DIAGNOSIS — Z97.5 NEXPLANON IN PLACE: ICD-10-CM

## 2024-11-15 DIAGNOSIS — R63.5 WEIGHT GAIN: ICD-10-CM

## 2024-11-15 DIAGNOSIS — N64.4 BREAST TENDERNESS: ICD-10-CM

## 2024-11-15 PROCEDURE — 99395 PREV VISIT EST AGE 18-39: CPT | Performed by: NURSE PRACTITIONER

## 2024-11-15 PROCEDURE — 99213 OFFICE O/P EST LOW 20 MIN: CPT | Performed by: NURSE PRACTITIONER

## 2024-11-15 PROCEDURE — 99459 PELVIC EXAMINATION: CPT | Performed by: NURSE PRACTITIONER

## 2024-11-15 NOTE — PROGRESS NOTES
Subjective:  Chief Complaint   Patient presents with    Annual     Patient would like to discuss removal of nexplanon and discuss other BC options.   Patient wants to talk about DEPO.      34 year old female  presents for annual.    Pt also with concerns of breast/nipple sensitivity  + pain  Denies discharge and changes to skin of breast  Had breast US at 17    Pt also considering removal of nexplanon   Feels that since she isn't getting menses that she is gaining weight  Also notes that she has been less active 2/2 hip pain    Pt also would like to discuss current litigation with depo  Notes she received 1 or 2 injections of depo in her teen years    No LMP recorded. Patient has had an implant.  Hx Prior Abnormal Pap: Yes (over a year ago)  Pap Date: 22  Pap Result Notes: wnl  Menarche: 12 (11/15/2024  9:55 AM)  Period Cycle (Days): Nexplanon (11/15/2024  9:55 AM)  Period Duration (Days): NA (11/15/2024  9:55 AM)  Period Flow: NA (11/15/2024  9:55 AM)  Use of Birth Control (if yes, specify type): Nexplanon (11/15/2024  9:55 AM)  Hx Prior Abnormal Pap: Yes (over a year ago) (11/15/2024  9:55 AM)  Pap Date: 22 (11/15/2024  9:55 AM)  Pap Result Notes: wnl (11/15/2024  9:55 AM)    HPV + - colposcopy negative over 10 years ago  Declines STD screen  Contraception: Nexplanon    Denies family history of breast, ovarian and colon CA.  MGM with history of colon CA (60s)  M aunt with history of colon CA (50s)    Feeling safe at home.    Most Recent Immunizations   Administered Date(s) Administered    >=3 YRS TRI  MULTIDOSE VIAL (38422) FLU CLINIC 10/12/2023    Covid-19 Vaccine Pfizer 30 mcg/0.3 ml 10/05/2021    Covid-19 Vaccine Pfizer Bivalent 30mcg/0.3mL 10/20/2022    FLULAVAL 6 months & older 0.5 ml Prefilled syringe (99875) 10/17/2022    FLUZONE 6 months and older PFS 0.5 ml (31037) 2021    Fluvirin, 3 Years & >, Im 10/09/2017    HEP B 1999    HPV (Gardasil) 01/15/2010    Influenza 10/04/2024     MMR 09/17/1993    TDAP 08/21/2017      reports that she has never smoked. She has never used smokeless tobacco.   reports current alcohol use.    Past Medical History:    Anxiety state, unspecified    Fibroids    Human papilloma virus infection    Pap smear for cervical cancer screening    wnl     Past Surgical History:   Procedure Laterality Date    Colposcopy, cervix w/upper adjacent vagina; w/biopsy(s), cervix  2013    Other surgical history  2007    LEFT BREAST CYST REMOVED    Other surgical history  2013    Lasik       Review of Systems:  Pertinent items are noted in the HPI.    Objective:  /80   Pulse 69   Ht 61.5\"   Wt 219 lb 12.8 oz (99.7 kg)   BMI 40.86 kg/m²    Physical Examination:  General appearance: Well dressed, well nourished in no apparent distress  Neurologic/Psychiatric: Alert and oriented to person, place and time, mood normal, affect appropriate  Head: Normocephalic without obvious deformity, atraumatic  Neck: No thyromegaly, supple, non-tender, no masses, no adenopathy  Lungs: Clear to auscultation bilaterally, no rales, wheezes or rhonchi  Breasts: Symmetric, non-tender, no masses, lesions, retraction, dimpling or discharge bilaterally, no axillary or supraclavicular lymphadenopathy  Heart: Regular rate and rhythm, no gallops or murmurs  Abdomen: Soft, non-tender, non-distended, no masses, no hepatosplenomegaly, no hernias, no inguinal lymphadenopathy  Pelvic:    External genitalia- Normal, Bartholin's, urethra, skeins glands normal   Vagina- No vaginal lesions, physiologic discharge   Cervix- No lesions, long/closed, no cervical motion tenderness   Uterus- Normal sized, non-tender, no masses   Adnexa-  Non-tender, no masses  Extremities: + LUE implant palpated, Non-tender, full range of motion, no clubbing, cyanosis or edema  Skin:  General inspection- no rashes, lesions or discoloration    Assessment/Plan:  Normal well-woman exam.  Yearly mammogram at age 39.  Pap smear  UTD.    Declined chaperone for exam today     Diagnoses and all orders for this visit:    Well woman exam with routine gynecological exam  - self breast exam discussed and encouraged    Nexplanon in place  - implant palpated  - inserted 11/2022  - remove on or before 11/2025    Breast tenderness  - reviewed causes of breast tenderness including hormones, dehydration, caffeine consumption and bra fit  - discussed watching to see if pain is cyclical  - if pain is persistent or worsening recommend breast imaging    Weight gain  - discussed side effect of weight gain with nexplanon  - we also discussed that hip pain may effect weight gain  - to follow up if she desires nexplanon removal         Return in about 1 year (around 11/15/2025) for annual well woman exam or sooner if needed.

## 2024-11-25 ENCOUNTER — TELEPHONE (OUTPATIENT)
Dept: ORTHOPEDICS CLINIC | Facility: CLINIC | Age: 34
End: 2024-11-25

## 2024-11-25 NOTE — TELEPHONE ENCOUNTER
Spoke with pt. She states that her MRI has been denied. Is there anything that we can do to get this un-denied?     She is an [employer] employee and works downstairs in [place] and states the pain is really taking it's toll on her. She has for the MRI on 11/30 and doesn't want to cancel it just yet.     Can we try a P2P?    Please advise `

## 2024-11-26 ENCOUNTER — PATIENT MESSAGE (OUTPATIENT)
Dept: CASE MANAGEMENT | Age: 34
End: 2024-11-26

## 2024-11-26 ENCOUNTER — TELEPHONE (OUTPATIENT)
Dept: CASE MANAGEMENT | Age: 34
End: 2024-11-26

## 2024-11-26 NOTE — TELEPHONE ENCOUNTER
MRI Left Hip        Status: DENIED        Reference number [id number]     A copy of the denial letter is filed under the MEDIA tab, reference for complete details. You may reach out to Johanny at [phone] to discuss decision.     Please reach out to patient with plan of care.      Thank you

## 2025-01-06 ENCOUNTER — HOSPITAL ENCOUNTER (OUTPATIENT)
Dept: MRI IMAGING | Facility: HOSPITAL | Age: 35
Discharge: HOME | End: 2025-01-06
Attending: PHYSICIAN ASSISTANT
Payer: COMMERCIAL

## 2025-01-06 DIAGNOSIS — M25.852 LEFT HIP IMPINGEMENT SYNDROME: ICD-10-CM

## 2025-01-06 DIAGNOSIS — S73.192D TEAR OF LEFT ACETABULAR LABRUM, SUBSEQUENT ENCOUNTER: ICD-10-CM

## 2025-01-06 DIAGNOSIS — M25.552 LEFT HIP PAIN: ICD-10-CM

## 2025-01-06 PROCEDURE — 73723 MRI JOINT LWR EXTR W/O&W/DYE: CPT | Performed by: PHYSICIAN ASSISTANT

## 2025-01-06 PROCEDURE — A9575 INJ GADOTERATE MEGLUMI 0.1ML: HCPCS | Performed by: PHYSICIAN ASSISTANT

## 2025-01-06 RX ORDER — GADOTERATE MEGLUMINE 376.9 MG/ML
20 INJECTION INTRAVENOUS
Status: COMPLETED | OUTPATIENT
Start: 2025-01-06 | End: 2025-01-06

## 2025-01-06 RX ADMIN — GADOTERATE MEGLUMINE 20 ML: 376.9 INJECTION INTRAVENOUS at 10:36:00

## 2025-01-09 ENCOUNTER — OFFICE VISIT (OUTPATIENT)
Dept: ORTHOPEDICS CLINIC | Facility: CLINIC | Age: 35
End: 2025-01-09
Payer: COMMERCIAL

## 2025-01-09 DIAGNOSIS — M25.852 FEMOROACETABULAR IMPINGEMENT OF LEFT HIP: Primary | ICD-10-CM

## 2025-01-09 PROCEDURE — 99214 OFFICE O/P EST MOD 30 MIN: CPT | Performed by: STUDENT IN AN ORGANIZED HEALTH CARE EDUCATION/TRAINING PROGRAM

## 2025-01-10 NOTE — PROGRESS NOTES
NURSING INTAKE COMMENTS:   Chief Complaint   Patient presents with    Hip Pain     Left onset in 11/2023 her L leg gave out on her - it stopped working - was seen in 11/2024 by INOCENCIO Muñoz - had 2 injections in 2023 - no relief - has a new x-ray and MRI in the system - still has pain rated as 8/10 most of the time        HPI: This 34 year old female presents today with complaints of left hip pain.  Patient has been dealing with left hip pain for roughly 2 years.  She is status post 2 cortisone injections the most recent one was and 2023.  She reports that the first 1 did provide significant efficacy, the second did not provide significant relief.  She describes groin pain that is affecting her quality of life.  She reports that she is unable to exercise and workout the way that she would like to be due to her anterior groin pain.  Her point her pain does improve during periods of inactivity however they return whenever she tries to be active again.    Past Medical History:    Anxiety state, unspecified    Fibroids    Human papilloma virus infection    Pap smear for cervical cancer screening    wnl     Past Surgical History:   Procedure Laterality Date    Colposcopy, cervix w/upper adjacent vagina; w/biopsy(s), cervix  2013    Other surgical history  2007    LEFT BREAST CYST REMOVED    Other surgical history  2013    Lasik     Current Outpatient Medications   Medication Sig Dispense Refill    Meloxicam 15 MG Oral Tab Take 1 tablet (15 mg total) by mouth daily. (Patient not taking: Reported on 11/15/2024) 14 tablet 0    montelukast 10 MG Oral Tab Take 1 tablet (10 mg total) by mouth nightly. 90 tablet 1    meclizine 25 MG Oral Tab Take 1 tablet (25 mg total) by mouth as needed.      fluticasone propionate 50 MCG/ACT Nasal Suspension 2 sprays by Nasal route daily. 1-2 weeks  than as needed (Patient not taking: Reported on 3/12/2024) 16 g 1    albuterol (PROAIR HFA) 108 (90 Base) MCG/ACT Inhalation Aero Soln Inhale  2 puffs into the lungs every 8 (eight) hours as needed for Wheezing. 1 each 1    fluocinonide 0.05 % External Ointment Apply 1 Application. topically 2 (two) times daily. (Patient not taking: Reported on 11/15/2024) 60 g 2    ondansetron 4 MG Oral Tablet Dispersible Take 1 tablet (4 mg total) by mouth every 8 (eight) hours as needed for Nausea. 30 tablet 2     Allergies[1]  Family History   Problem Relation Age of Onset    Heart Disease Maternal Grandfather     Diabetes Maternal Grandfather     Hypertension Father     Obesity Father     No Known Problems Mother     Diabetes Maternal Grandmother     Heart Disorder Maternal Grandmother     Hypertension Maternal Grandmother     Cancer Maternal Grandmother 65        colon cancer    Diabetes Paternal Grandmother     Diabetes Paternal Grandfather     Stroke Neg        Social History     Occupational History    Not on file   Tobacco Use    Smoking status: Never    Smokeless tobacco: Never   Vaping Use    Vaping status: Never Used   Substance and Sexual Activity    Alcohol use: Yes     Alcohol/week: 0.0 standard drinks of alcohol     Comment: ONCE EVERY 2-3 MONTHS    Drug use: No    Sexual activity: Yes     Partners: Male     Birth control/protection: Implant        Review of Systems:  GENERAL: denies fevers, chills, night sweats, fatigue, unintentional weight loss/gain  SKIN: denies skin lesions, open sores, rash  HEENT:denies recent vision change, new nasal congestion,hearing loss, tinnitus, sore throat, headaches  RESPIRATORY: denies new shortness of breath, cough, asthma, wheezing  CARDIOVASCULAR: denies chest pain, leg cramps with exertion, palpitations, leg swelling  GI: denies abdominal pain, nausea, vomiting, diarrhea, constipation, hematochezia, worsening heartburn or stomach ulcers  : denies dysuria, hematuria, incontinence, increased frequency, urgency, difficulty urinating  MUSCULOSKELETAL: denies musculoskeletal complaints other than in HPI  NEURO: denies  numbness, tingling, weakness, balance issues, dizziness, memory loss  PSYCHIATRIC: denies Hx of depression, anxiety, other psychiatric disorders  HEMATOLOGIC: denies blood clots, anemia, blood clotting disorders, blood transfusion  ENDOCRINE: denies autoimmune disease, thyroid issues, or diabetes  ALLERGY: denies asthma, seasonal allergies    Physical Examination:    There were no vitals taken for this visit.  Constitutional: appears well hydrated, alert and responsive, no acute distress noted  Extremities: Normal  Neurological: Normal    left Hip Exam  Ambulates with normal gait  ROM 0-90 with pain at terminal forced flexion    Internal Rotation 15  External Rotation 25  FADIR positive  TOBY equivocal  Trochanteric pain sign positive  [facility] positive      Imaging:   MRI HIPS (W+WO), LEFT (CPT=73723)    Result Date: 1/6/2025  PROCEDURE:  MRI HIPS (W+WO), LEFT (CPT=73723)  COMPARISON:  None.  INDICATIONS:  M25.852 Left hip impingement syndrome S73.192D Tear of left acetabular labrum, subsequent encounter M25.552 Left hip pain  TECHNIQUE:  A comprehensive examination was performed utilizing a variety of imaging planes and imaging parameters to optimize visualization of suspected pathology. Images were obtained both before and after intravenous gadolinium infusion.   PATIENT STATED HISTORY:(As transcribed by Technologist)  Torn labrum left hip x2 years. The patient is now unable to exercise without having severe pain for weeks.   CONTRAST USED:  18 mL of Dotarem  FINDINGS:    OSSEOUS STRUCTURES:  No fracture, malalignment, or stress injury.  No evidence of femoral head AVN. MUSCULATURE:  No acute strain, edema, or atrophy.  No evidence of ischiofemoral impingement or piriformis syndrome.  No pathologic enhancement. TENDONS:  Insertions about the greater and lesser trochanter are unremarkable.  Hamstring and adductor insertions are unremarkable.  HIP JOINTS:  The hip joint space and articular cartilage are  preserved.  Normal alpha angle of 50 degrees.  No evidence of labral tear or detachment.  Smoothly marginated fluid-filled cleft along the posterior labrum consistent with normal variant sulcus  (series 5, image 12).  No joint effusion.  No iliopsoas or trochanteric bursitis.            CONCLUSION:   Unremarkable left hip MRI.  No imaging features of labral tear.  If clinical suspicion for labral tear persists, MRI arthrogram may offer further diagnostic specificity.   LOCATION:  [facility]   Dictated by (CST): Re Nuno MD on 1/06/2025 at 10:47 AM     Finalized by (CST): Re Nuno MD on 1/06/2025 at 11:00 AM          Labs:  Lab Results   Component Value Date    WBC 10.1 08/08/2024    HGB 13.0 08/08/2024    .0 08/08/2024      Lab Results   Component Value Date     (H) 08/08/2024    BUN 11 08/08/2024    CREATSERUM 0.84 08/08/2024     08/18/2017    GFRNAA 108 02/25/2022    GFRAA 124 02/25/2022        Assessment and Plan:  There are no diagnoses linked to this encounter.    Assessment: This is a very pleasant 34-year-old female with left hip pain consistent with femoral acetabular impingement of the left hip    Plan: Patient is a very pleasant 34-year-old female with left knee pain consistent with femoral acetabular impingement left hip.  At this point she does feel like she has exhausted nonoperative treatment.  She is not ready to miss work until roughly April or May however she would like to proceed with left hip arthroscopy.  For that reason we will begin with a left hip cortisone injection and physical therapy prior to any operative intervention.  She will undergo left hip cortisone injection with Dr. Dustin Shah.  She will see us again in March and at that point we will to plan a surgical date and obtain CT scan.  All questions answered at today's visit.    Follow Up: No follow-ups on file.    Meliton Castaneda MD       [1] No Known Allergies

## 2025-02-06 ENCOUNTER — TELEPHONE (OUTPATIENT)
Dept: PHYSICAL MEDICINE AND REHAB | Facility: CLINIC | Age: 35
End: 2025-02-06

## 2025-02-06 ENCOUNTER — OFFICE VISIT (OUTPATIENT)
Dept: PHYSICAL MEDICINE AND REHAB | Facility: CLINIC | Age: 35
End: 2025-02-06
Payer: COMMERCIAL

## 2025-02-06 DIAGNOSIS — M25.852 FEMOROACETABULAR IMPINGEMENT OF LEFT HIP: Primary | ICD-10-CM

## 2025-02-06 RX ORDER — TRIAMCINOLONE ACETONIDE 40 MG/ML
40 INJECTION, SUSPENSION INTRA-ARTICULAR; INTRAMUSCULAR ONCE
Status: COMPLETED | OUTPATIENT
Start: 2025-02-06 | End: 2025-02-06

## 2025-02-06 RX ORDER — TRIAMCINOLONE ACETONIDE 40 MG/ML
120 INJECTION, SUSPENSION INTRA-ARTICULAR; INTRAMUSCULAR ONCE
Status: SHIPPED | OUTPATIENT
Start: 2025-02-06

## 2025-02-06 RX ORDER — LIDOCAINE HYDROCHLORIDE 10 MG/ML
7 INJECTION, SOLUTION INFILTRATION; PERINEURAL ONCE
Status: COMPLETED | OUTPATIENT
Start: 2025-02-06 | End: 2025-02-06

## 2025-02-06 RX ADMIN — TRIAMCINOLONE ACETONIDE 40 MG: 40 INJECTION, SUSPENSION INTRA-ARTICULAR; INTRAMUSCULAR at 11:40:00

## 2025-02-06 NOTE — TELEPHONE ENCOUNTER
Initiated authorization for Ultrasound guided left hip joint aspiration and injection with corticosteroid. CPT/HCPCS 64971,  with [employer] automated line.  Completed in the office today    Status: Approved - no auth required  Reference/Authorization # [id number]  Authorization is not required based on medical necessity, however, is not a guarantee of payment and may be subject to review once claim is submitted.

## 2025-02-06 NOTE — PROGRESS NOTES
[facility]  NEW PATIENT EVALUATION    Consultation as a request of Dr. Chino ref. provider found      HISTORY OF PRESENT ILLNESS:     Chief Complaint   Patient presents with    Follow - Up     New patient is here with complaints of constant pain throughout left hip and radiates to leg and groin. MRI of right hip completed 1/06/25.Referred by Dr. Yoanna Jarquin and looking into injection. Admits N/T. Takes meloxicam as needed. No current physical therapy. Pain 8/10.       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 She was referred by orthopedic surgery due to concern for left femoroacetabular impingement (GABRIELE) and for ultrasound-guided diagnostic and therapeutic corticosteroid injection. The patient describes a shooting pain originating in the groin and radiating down the leg, sometimes as far as the foot. She also reports tenderness in the hip and tightness in the glutes. The pain is often triggered by certain steps, particularly when walking downstairs. The patient has a history of back pain, but it does not radiate down the leg.    Two years ago, the patient received two corticosteroid injections in the hip. The first injection was highly effective, but the second provided no relief. Since then, the patient has been hesitant to try another injection and has considered surgery. She reports that sitting in certain positions can cause pain in the legs and that the left leg sometimes feels weak after prolonged walking. The patient also experiences outer thigh pain, which she attributes to compensatory mechanisms.    The patient has a history of physical therapy for GABRIELE, which was diagnosed five years ago. She reports that her symptoms did not significantly worsen until she tore her labrum. The patient also mentions that she has been experiencing pain in the neck after straining it while looking at  the cain. She has been managing this pain with physical therapy and chiropractic care.       PHYSICAL EXAM:   There were no vitals taken for this visit.      HIP:  Inspection: no erythema, swelling, or obvious deformity  Palpation: Tenderness palpation left greater trochanter and gluteus medius as well as IT band with trigger points in these areas noted  ROM: intact to all planes of motion  Strength: 5/5 in bilateral lower extremities  Sensation: Intact to light touch in all dermatomes of the lower extremities  TOBY: positive for contralateral SI joint pain and ipsilateral hip pain / tightness  FADIR: positive for intra-articular hip pain  Surjit: positive for ITB tightness    IMAGING:   MRI left hip completed 1/6/2025 is negative for an acute abnormality with possible labral tear and x-ray imaging is notable for slight degenerative changes in the hips      All imaging results were reviewed and discussed with patient.      ASSESSMENT/PLAN:     1. Femoroacetabular impingement of left hip        Assessment & Plan  Left Femoroacetabular Impingement (GABRIELE)  Chronic hip pain with radiation down the leg. Previous corticosteroid injections with mixed results. Pain exacerbated by certain movements and positions.  -Perform ultrasound-guided diagnostic and therapeutic corticosteroid injection today.  -Advise patient to ice the area post-procedure and avoid strenuous activity.  -Schedule follow-up in 4 weeks via video visit to assess response to treatment.  -Continue follow-up with orthopedic surgery as discussed       The patient verbalized understanding with the plan and was in agreement. All questions/concerns were addressed and there were no barriers to learning.  Please note Dragon dictation software was used to dictate this note and may result in inadvertent typos.    Dustin Shah DO, FAAPMR & CAQSM  Physical Medicine and Rehabilitation  Sports and Spine Medicine    PAST MEDICAL HISTORY:     Past Medical History:    Anxiety  state, unspecified    Fibroids    Human papilloma virus infection    Pap smear for cervical cancer screening    wnl         PAST SURGICAL HISTORY:     Past Surgical History:   Procedure Laterality Date    Colposcopy, cervix w/upper adjacent vagina; w/biopsy(s), cervix  2013    Other surgical history  2007    LEFT BREAST CYST REMOVED    Other surgical history  2013    Lasik         CURRENT MEDICATIONS:     Current Outpatient Medications   Medication Sig Dispense Refill    Meloxicam 15 MG Oral Tab Take 1 tablet (15 mg total) by mouth daily. (Patient not taking: Reported on 11/15/2024) 14 tablet 0    montelukast 10 MG Oral Tab Take 1 tablet (10 mg total) by mouth nightly. 90 tablet 1    meclizine 25 MG Oral Tab Take 1 tablet (25 mg total) by mouth as needed.      fluticasone propionate 50 MCG/ACT Nasal Suspension 2 sprays by Nasal route daily. 1-2 weeks  than as needed (Patient not taking: Reported on 3/12/2024) 16 g 1    albuterol (PROAIR HFA) 108 (90 Base) MCG/ACT Inhalation Aero Soln Inhale 2 puffs into the lungs every 8 (eight) hours as needed for Wheezing. 1 each 1    fluocinonide 0.05 % External Ointment Apply 1 Application. topically 2 (two) times daily. (Patient not taking: Reported on 11/15/2024) 60 g 2    ondansetron 4 MG Oral Tablet Dispersible Take 1 tablet (4 mg total) by mouth every 8 (eight) hours as needed for Nausea. 30 tablet 2         ALLERGIES:   Allergies[1]      FAMILY HISTORY:     Family History   Problem Relation Age of Onset    Heart Disease Maternal Grandfather     Diabetes Maternal Grandfather     Hypertension Father     Obesity Father     No Known Problems Mother     Diabetes Maternal Grandmother     Heart Disorder Maternal Grandmother     Hypertension Maternal Grandmother     Cancer Maternal Grandmother 65        colon cancer    Diabetes Paternal Grandmother     Diabetes Paternal Grandfather     Stroke Neg           SOCIAL HISTORY:     Social History     Socioeconomic History    Marital  status: Single   Tobacco Use    Smoking status: Never    Smokeless tobacco: Never   Vaping Use    Vaping status: Never Used   Substance and Sexual Activity    Alcohol use: Yes     Alcohol/week: 0.0 standard drinks of alcohol     Comment: ONCE EVERY 2-3 MONTHS    Drug use: No    Sexual activity: Yes     Partners: Male     Birth control/protection: Implant   Other Topics Concern    Caffeine Concern Yes     Comment: soda    Exercise No    Seat Belt Yes    History of tanning Yes    Reaction to local anesthetic No    Pt has a pacemaker No    Pt has a defibrillator No          REVIEW OF SYSTEMS:   No patient-reported data collected this visit.      PHYSICAL EXAM:   General: No immediate distress  Head: Normocephalic/ Atraumatic  Eyes: Extra-occular movements intact.   Ears: No auricular hematoma or deformities  Mouth: No lesions or ulcerations  Heart: peripheral pulses intact. Normal capillary refill.   Lungs: Non-labored respirations  Abdomen: No abdominal guarding  Extremities: No lower extremity edema bilaterally   Skin: No lesions noted   Cognition: alert & oriented x 3, attentive, able to follow 2 step commands, comprehention intact, spontaneous speech intact  Psychiatric: Mood and affect appropriate      LABS:     Lab Results   Component Value Date     01/19/2023    A1C 5.7 (H) 01/19/2023     Lab Results   Component Value Date    WBC 10.1 08/08/2024    RBC 4.54 08/08/2024    HGB 13.0 08/08/2024    HCT 38.1 08/08/2024    MCV 83.9 08/08/2024    MCH 28.6 08/08/2024    MCHC 34.1 08/08/2024    RDW 13.7 08/08/2024    .0 08/08/2024     Lab Results   Component Value Date     (H) 08/08/2024    BUN 11 08/08/2024    BUNCREA 13.1 08/08/2024    CREATSERUM 0.84 08/08/2024    ANIONGAP 6 08/08/2024     08/18/2017    GFRNAA 108 02/25/2022    GFRAA 124 02/25/2022    CA 9.7 08/08/2024    OSMOCALC 292 08/08/2024    ALKPHO 94 08/08/2024    AST 17 08/08/2024    ALT 19 08/08/2024    BILT 0.6 08/08/2024    TP  7.5 08/08/2024    ALB 4.4 08/08/2024    GLOBULIN 3.1 08/08/2024    AGRATIO 1.4 12/21/2019     08/08/2024    K 3.8 08/08/2024     08/08/2024    CO2 26.0 08/08/2024     No results found for: \"PTP\", \"PT\", \"INR\"  Lab Results   Component Value Date    VITD 30.8 08/18/2017                    [1] No Known Allergies

## 2025-02-06 NOTE — PROCEDURES
Procedure:  Ultrasound guided LEFT hip joint injection  The risks, benefits and anticipated outcomes of the procedure, the risks and benefits of the alternatives to the procedure, and the roles and tasks of the personnel to be involved, were discussed with the patient, and the patient consents to the procedure and agrees to proceed.  UNIVERSAL PROTOCOL / SAFETY CHECKLIST  Sign in Communication: Completed  Time Out:  Team Confirms the Correct Patient, Correct Procedure, Correct Site and Site Marking, Correct Position (if applicable), Prep and Dry Time (if applicable).    Affirmation of Time Out: YES  Sign Out Discussion: Completed if applicable  The procedure was carried out under sterile prep.  A 27 ga 1&1/4in needle was introduced for skin anesthesia with 3 cc of 2% lidocaine.  Then, again with real time ultrasound guidance, a 22 gauge 3.5 in  needle was advanced to the femoral head/neck junction.  Following negative aspiration, a mixture of 4 cc of 1% lidocaine and 1 cc of Kenalog (40mg/ml) was injected.  Permanent images were taken and stored in the PACS system.    Ultrasound interpretation was performed prior to the procedure to identify the target and any adjacent neurovascular structures.  Subsequently, interpretation was performed during real-time needle guidance confirming placement.  Post-intervention interpretation was also performed confirming appropriate injectate flow and hemostasis. The patient tolerated the procedure without complication and was instructed in post-procedure precautions.  See patient instructions.    Dustin Shah DO, FAAPMR & CAQSM  Physical Medicine and Rehabilitation/Sports Medicine  [facility]

## 2025-02-06 NOTE — PROGRESS NOTES
The following individual(s) verbally consented to be recorded using ambient AI listening technology and understand that they can each withdraw their consent to this listening technology at any point by asking the clinician to turn off or pause the recording: MICHAEL

## 2025-03-19 ENCOUNTER — OFFICE VISIT (OUTPATIENT)
Dept: ORTHOPEDICS CLINIC | Facility: CLINIC | Age: 35
End: 2025-03-19
Payer: COMMERCIAL

## 2025-03-19 DIAGNOSIS — M25.859 FEMOROACETABULAR IMPINGEMENT: Primary | ICD-10-CM

## 2025-03-19 PROCEDURE — 99215 OFFICE O/P EST HI 40 MIN: CPT | Performed by: STUDENT IN AN ORGANIZED HEALTH CARE EDUCATION/TRAINING PROGRAM

## 2025-03-19 NOTE — PROGRESS NOTES
[place] - ORTHOPEDICS  [address] [facility]  [address]  [phone]     NURSING INTAKE COMMENTS:   Chief Complaint   Patient presents with    Hip Pain     F/u  left hip pain had cortisone injection with Dr. Dustin Shah on 02/06/2025 and relieve pain, but has soreness and intermittent pain when laying on left hip at night time. Has not start PT due to coverage.            The following individual(s) verbally consented to be recorded using ambient AI listening technology and understand that they can each withdraw their consent to this listening technology at any point by asking the clinician to turn off or pause the recording:    Patient name: Angela Chavez          HPI:   History of Present Illness  Angela Chavez is a 34 year old female who presents for follow-up on hip pain and surgical planning.    She experiences left hip pain, described as a 'bursa thing' on the side of the right hip. Her LEFT hip has been doing well following an injection, with only some tenderness when lying on that side. She recalls a previous injection in the left hip joint that alleviated shooting pain down her leg, which was initially thought to be related to her back. The injection provided significant relief, similar to an injection she had one and a half to two years ago.    Overall, she continues to have some groin related left hip pain and wishes to follow through with surgery for this. She has a prior MRI demonstrating a left hip labral tear and GABRIELE.            Past Medical History:    Anxiety state, unspecified    Fibroids    Human papilloma virus infection    Pap smear for cervical cancer screening    wnl     Past Surgical History:   Procedure Laterality Date    Colposcopy, cervix w/upper adjacent vagina; w/biopsy(s), cervix  2013    Other surgical history  2007    LEFT BREAST CYST REMOVED    Other surgical history  2013    Lasik     Current Outpatient Medications   Medication Sig Dispense Refill    montelukast 10 MG Oral Tab  Take 1 tablet (10 mg total) by mouth nightly. 90 tablet 1    meclizine 25 MG Oral Tab Take 1 tablet (25 mg total) by mouth as needed.      fluticasone propionate 50 MCG/ACT Nasal Suspension 2 sprays by Nasal route daily. 1-2 weeks  than as needed 16 g 1    albuterol (PROAIR HFA) 108 (90 Base) MCG/ACT Inhalation Aero Soln Inhale 2 puffs into the lungs every 8 (eight) hours as needed for Wheezing. 1 each 1    fluocinonide 0.05 % External Ointment Apply 1 Application. topically 2 (two) times daily. 60 g 2    ondansetron 4 MG Oral Tablet Dispersible Take 1 tablet (4 mg total) by mouth every 8 (eight) hours as needed for Nausea. 30 tablet 2    Meloxicam 15 MG Oral Tab Take 1 tablet (15 mg total) by mouth daily. (Patient not taking: Reported on 3/19/2025) 14 tablet 0     Allergies[1]  Family History   Problem Relation Age of Onset    Heart Disease Maternal Grandfather     Diabetes Maternal Grandfather     Hypertension Father     Obesity Father     No Known Problems Mother     Diabetes Maternal Grandmother     Heart Disorder Maternal Grandmother     Hypertension Maternal Grandmother     Cancer Maternal Grandmother 65        colon cancer    Diabetes Paternal Grandmother     Diabetes Paternal Grandfather     Stroke Neg        Social History     Occupational History    Not on file   Tobacco Use    Smoking status: Never    Smokeless tobacco: Never   Vaping Use    Vaping status: Never Used   Substance and Sexual Activity    Alcohol use: Yes     Alcohol/week: 0.0 standard drinks of alcohol     Comment: ONCE EVERY 2-3 MONTHS    Drug use: No    Sexual activity: Yes     Partners: Male     Birth control/protection: Implant        Review of Systems:  GENERAL: denies fevers, chills, night sweats, fatigue, unintentional weight loss/gain  SKIN: denies skin lesions, open sores, rash  HEENT:denies recent vision change, new nasal congestion,hearing loss, tinnitus, sore throat, headaches  RESPIRATORY: denies new shortness of breath, cough,  asthma, wheezing  CARDIOVASCULAR: denies chest pain, leg cramps with exertion, palpitations, leg swelling  GI: denies abdominal pain, nausea, vomiting, diarrhea, constipation, hematochezia, worsening heartburn or stomach ulcers  : denies dysuria, hematuria, incontinence, increased frequency, urgency, difficulty urinating  MUSCULOSKELETAL: denies musculoskeletal complaints other than in HPI  NEURO: denies numbness, tingling, weakness, balance issues, dizziness, memory loss  PSYCHIATRIC: denies Hx of depression, anxiety, other psychiatric disorders  HEMATOLOGIC: denies blood clots, anemia, blood clotting disorders, blood transfusion  ENDOCRINE: denies autoimmune disease, thyroid issues, or diabetes  ALLERGY: denies asthma, seasonal allergies    Physical Examination:    There were no vitals taken for this visit.    Physical Exam  MUSCULOSKELETAL: Left hip tenderness on palpation. No bursa on right hip. Tenderness on palpation of left hip bursa. No pain on resisted movement of left hip.    Constitutional: appears well hydrated, alert and responsive, no acute distress noted  Extremities: Normal  Neurological: Normal    left Hip Exam  Ambulates with normal gait  ROM 0-110    Internal Rotation 20  External Rotation 35  FADIR Positive  TOBY Positive  Trochanteric pain sign Bilaterally positve  [facility] Positive      Imaging:   Results        No results found.     Labs:  Lab Results   Component Value Date    WBC 10.1 08/08/2024    HGB 13.0 08/08/2024    .0 08/08/2024      Lab Results   Component Value Date     (H) 08/08/2024    BUN 11 08/08/2024    CREATSERUM 0.84 08/08/2024     08/18/2017    GFRNAA 108 02/25/2022    GFRAA 124 02/25/2022        Assessment and Plan:  Assessment & Plan    Left Hip GABRIELE  Would benefit from LEFT Hip Arthroscopic labral repair. Will schedule for end of June.    Bilateral hip bursitis  Improvement in left hip pain post-injection. Right hip now symptomatic, indicating  bilateral bursitis. Right hip injection recommended, will not affect planned surgery.  - Refer to Pablo for bilateral bursal injections under ultrasound guidance.  - Schedule left hip surgery for end of June, preferably before July 4th.    Post-operative recovery planning  Guidance provided on recovery timeline, emphasizing gradual return to activities to prevent overexertion.  - Plan for 2-3 weeks on crutches post-surgery.  - Take one month off work for recovery.  - Avoid heavy lifting and strenuous activities during recovery.  - Begin light jogging around 12 weeks post-surgery.  - Expect to return to sports and full activities by 5-7 months post-surgery.    Physical therapy considerations  Advised against pre-operative physical therapy to conserve insurance-covered visits for post-operative therapy.  - Hold off on pre-operative physical therapy to conserve insurance-covered visits for post-operative therapy.    Calf cramps  Cramps may be related to dehydration or electrolyte imbalance. Advised to increase water intake and consult primary care provider.  - Increase water intake.  - Discuss calf cramps with primary care provider on March 24th.      We reviewed the treatment of this disease condition, both surgical and nonsurgical. I have recommended that we proceed with surgical treatment as we agree surgical intervention would likely offer the best opportunity for symptomatic relief and functional recovery. I used diagrams, radiographic studies, and a 3-dimensional model to outline the patient's pathology, as well as general surgical principles.  In light of this, we recommend proceeding with a LEFT HIP arthroscopic labral repair, femoral osteochondroplasty, acetabuloplasty, capsular plication.     We discussed the risk and benefits of the procedure, including, but not limited to:  infection, blood loss, potential transient or permanent injury to nerves or blood vessels, joint stiffness, persistent pain, need for  future operation, failure of healing, wound complications, failure of therapeutic intervention, risk of re-injury, fracture, fixation failure, deep vein thrombosis and pulmonary embolism. We discussed the proposed rehabilitation timeline as well as expected postoperative restrictions.    The patient voiced a good understanding of treatment options, risks and benefits, postoperative instructions, rehabilitation timeline, and restrictions. The patient was given the opportunity to ask questions, which were all answered to the best of my ability and to the patient's satisfaction.     Rationale for 57 Modifier Addendum    Decision for Major Surgery  The decision for a major surgery was made during this visit/consultation based on review and discussion of the history of presentation, examination, available labs/imaging, and the patient's functional status. The medical and surgical history were considered with regards to risk and potential modifications needed.      There are no diagnoses linked to this encounter.        Follow Up: No follow-ups on file.        Meliton Castaneda MD Orthopedic Surgery / Sports Medicine Specialist  [facility]  [facility] [place] [address]  [email or web address]    t: [phone] f: [phone]    This note was dictated using Dragon software.  While it was briefly proofread prior to completion, some grammatical, spelling, and word choice errors due to dictation may still occur.        [1] No Known Allergies

## 2025-03-19 NOTE — PROGRESS NOTES
Ortho Surgery Request  Surgery: LEFT HIP Arthroscopic Labral Repair and Femoral Osteochondroplasty      Surgical Assistant: YES  Surgery Day Request: Tuesday, June 24, 2025  Estimated Procedure Length: 4 Hours  Anesthesia type: General    Position: [facility] Guardian Table   Special Needs:[]Mini C-Arm []Large C-arm  []Nurse Assist [x]SCD's [x]Surgical Assistant []Ultrasound []Ultrasound Jet  Equipment: [facility] [employer]  Location:[facility] OR  Post Op Visit Date: 1 Week in [place]  CPT Code: 28622       ICD Code: Femoroacetabular impingement [M25.859]   Medical Clearance Needed: Not Needed  Preadmission Testing Orders:  __X__  Additional PAT orders:  Pre OP Orders:  Use [facility] procedure driven order set in addition to anesthesia protocol  Additional Pre Op Orders:  PCN Allergy:  No  If Yes:   __X__  Admit:  Hospital outpatient surgery  [facility]  No

## 2025-03-20 ENCOUNTER — TELEPHONE (OUTPATIENT)
Dept: ORTHOPEDICS CLINIC | Facility: CLINIC | Age: 35
End: 2025-03-20

## 2025-03-20 ENCOUNTER — TELEMEDICINE (OUTPATIENT)
Dept: PHYSICAL MEDICINE AND REHAB | Facility: CLINIC | Age: 35
End: 2025-03-20
Payer: COMMERCIAL

## 2025-03-20 DIAGNOSIS — M25.852 FEMOROACETABULAR IMPINGEMENT OF LEFT HIP: Primary | ICD-10-CM

## 2025-03-20 DIAGNOSIS — M70.61 GREATER TROCHANTERIC BURSITIS OF RIGHT HIP: ICD-10-CM

## 2025-03-20 DIAGNOSIS — M25.859 FEMOROACETABULAR IMPINGEMENT: Primary | ICD-10-CM

## 2025-03-20 PROCEDURE — 98006 SYNCH AUDIO-VIDEO EST MOD 30: CPT | Performed by: PHYSICAL MEDICINE & REHABILITATION

## 2025-03-20 NOTE — TELEPHONE ENCOUNTER
Date of Surgery: 06/24/2025      Post Op Appt: 7/2/25 8AM    Case ID: [id number]    Notes:     Ortho Surgery Request  Surgery: LEFT HIP Arthroscopic Labral Repair and Femoral Osteochondroplasty                                               Surgical Assistant: YES  Surgery Day Request: Tuesday, June 24, 2025  Estimated Procedure Length: 4 Hours  Anesthesia type: General         Position: [place] [facility] Table         Special Needs:[]Mini C-Arm []Large C-arm  []Nurse Assist [x]SCD's [x]Surgical Assistant []Ultrasound []Ultrasound Jet  Equipment: [place] [employer]  Location:[facility] OR  Post Op Visit Date: 1 Week in [place]  CPT Code: 02425                                                                    ICD Code: Femoroacetabular impingement [M25.859]   Medical Clearance Needed: Not Needed  Preadmission Testing Orders:  __X__  Additional PAT orders:  Pre OP Orders:  Use [facility] procedure driven order set in addition to anesthesia protocol  Additional Pre Op Orders:  PCN Allergy:  No  If Yes:   __X__  Admit:  Hospital outpatient surgery  Home Health  No

## 2025-03-23 NOTE — PROGRESS NOTES
[facility]    Telemedicine Visit - Follow Up Evaluation    Telehealth Verbal Consent   I conducted a telehealth visit with Angela Chavez today, 03/20/25, which was completed using two-way, real-time interactive audio and video communication. This has been done in good lilian to provide continuity of care in the best interest of the provider-patient relationship, due to the COVID -19 public health crisis/national emergency where restrictions of face-to-face office visits are ongoing. Every conscious effort was taken to allow for sufficient and adequate time to complete the visit.  The patient was made aware of the limitations of the telehealth visit, including treatment limitations as no physical exam could be performed.  The patient was advised to call 911 or to go to the ER in case there was an emergency.  The patient was also advised of the potential privacy & security concerns related to the telehealth platform.   The patient was made aware of where to find [facility]'s notice of privacy practices, telehealth consent form and other related consent forms and documents.  which are located on the [facility] website. The patient verbally agreed to telehealth consent form, related consents and the risks discussed.    Lastly, the patient confirmed that they were in [place].   Included in this visit, time may have been spent reviewing labs, medications, radiology tests and decision making. Appropriate medical decision-making and tests are ordered as detailed in the plan of care above.  Coding/billing information is submitted for this visit based on complexity of care and/or time spent for the visit.      HISTORY OF PRESENT ILLNESS:     Patient is following up for persistent bilateral hip pain.  She is following up with orthopedics.  She states that the injection in the hip joint provided good improvement however it keeps recurring and for this reason she is proceeding with surgery in  the near future.  She is not take any medication at this time.  She would like to start physical therapy for the right hip along the lateral aspect of the hip for greater trochanteric bursitis.      IMAGING:   No new imaging    All imaging results were reviewed and discussed with patient.      ASSESSMENT:     1. Femoroacetabular impingement of left hip    2. Greater trochanteric bursitis of right hip          PLAN:   Angela Chavez is a 34 year old female following up for bilateral hip pain with greater trochanteric bursitis and acetabular labral tear.  Recommend she start PT program for the hip bursitis and follow-up with orthopedics regarding her surgical intervention that she would like later this year.  She did respond well to the diagnostic and therapeutic injection of the hip joint left-sided.      Follow-up:   PRN    We discussed that a telemedicine visit is in place of an office visit; however, this limits the ability to perform a thorough physical examination which may affect objective findings related to a specific condition and can affect treatment.    The patient verbalized understanding with this plan and was in agreement.  There are no barriers to learning.  All questions were answered.  Please note Dragon dictation software was used to dictate this note which may result in inadvertent typos.    Dustin Shah D.O. FAAPMR & CAQSM  Physical Medicine and Rehabilitation/Sports Medicine    PAST MEDICAL HISTORY:     Past Medical History:    Anxiety state, unspecified    Fibroids    Human papilloma virus infection    Pap smear for cervical cancer screening    wnl         PAST SURGICAL HISTORY:     Past Surgical History:   Procedure Laterality Date    Colposcopy, cervix w/upper adjacent vagina; w/biopsy(s), cervix  2013    Other surgical history  2007    LEFT BREAST CYST REMOVED    Other surgical history  2013    Lasik         CURRENT MEDICATIONS:     Current Outpatient Medications   Medication Sig Dispense  Refill    Meloxicam 15 MG Oral Tab Take 1 tablet (15 mg total) by mouth daily. (Patient not taking: Reported on 3/19/2025) 14 tablet 0    montelukast 10 MG Oral Tab Take 1 tablet (10 mg total) by mouth nightly. 90 tablet 1    meclizine 25 MG Oral Tab Take 1 tablet (25 mg total) by mouth as needed.      fluticasone propionate 50 MCG/ACT Nasal Suspension 2 sprays by Nasal route daily. 1-2 weeks  than as needed 16 g 1    albuterol (PROAIR HFA) 108 (90 Base) MCG/ACT Inhalation Aero Soln Inhale 2 puffs into the lungs every 8 (eight) hours as needed for Wheezing. 1 each 1    fluocinonide 0.05 % External Ointment Apply 1 Application. topically 2 (two) times daily. 60 g 2    ondansetron 4 MG Oral Tablet Dispersible Take 1 tablet (4 mg total) by mouth every 8 (eight) hours as needed for Nausea. 30 tablet 2         ALLERGIES:   Allergies[1]      FAMILY HISTORY:     Family History   Problem Relation Age of Onset    Heart Disease Maternal Grandfather     Diabetes Maternal Grandfather     Hypertension Father     Obesity Father     No Known Problems Mother     Diabetes Maternal Grandmother     Heart Disorder Maternal Grandmother     Hypertension Maternal Grandmother     Cancer Maternal Grandmother 65        colon cancer    Diabetes Paternal Grandmother     Diabetes Paternal Grandfather     Stroke Neg           SOCIAL HISTORY:     Social History     Socioeconomic History    Marital status: Single   Tobacco Use    Smoking status: Never    Smokeless tobacco: Never   Vaping Use    Vaping status: Never Used   Substance and Sexual Activity    Alcohol use: Yes     Alcohol/week: 0.0 standard drinks of alcohol     Comment: ONCE EVERY 2-3 MONTHS    Drug use: No    Sexual activity: Yes     Partners: Male     Birth control/protection: Implant   Other Topics Concern    Caffeine Concern Yes     Comment: soda    Exercise No    Seat Belt Yes    History of tanning Yes    Reaction to local anesthetic No    Pt has a pacemaker No    Pt has a  defibrillator No          REVIEW OF SYSTEMS:   As noted in HPI      PHYSICAL EXAM:   General: No immediate distress  Head: Normocephalic/ Atraumatic  Eyes: Extra-occular movements intact  Ears/Nose/Throat:  External appearance identifies normal appearance without obvious deformity  Cardiovascular: No cyanosis, clubbing or edema  Respiratory: Non-labored respirations  Skin: No lesions noted   Neurological: alert & oriented x 3, attentive, able to follow commands, comprehention intact, spontaneous speech intact  Psychiatric: Mood and affect appropriate  Musculoskeletal Exam:  No change since last exam        LABS:     Lab Results   Component Value Date     01/19/2023    A1C 5.7 (H) 01/19/2023     Lab Results   Component Value Date    WBC 10.1 08/08/2024    RBC 4.54 08/08/2024    HGB 13.0 08/08/2024    HCT 38.1 08/08/2024    MCV 83.9 08/08/2024    MCH 28.6 08/08/2024    MCHC 34.1 08/08/2024    RDW 13.7 08/08/2024    .0 08/08/2024     Lab Results   Component Value Date     (H) 08/08/2024    BUN 11 08/08/2024    BUNCREA 13.1 08/08/2024    CREATSERUM 0.84 08/08/2024    ANIONGAP 6 08/08/2024     08/18/2017    GFRNAA 108 02/25/2022    GFRAA 124 02/25/2022    CA 9.7 08/08/2024    OSMOCALC 292 08/08/2024    ALKPHO 94 08/08/2024    AST 17 08/08/2024    ALT 19 08/08/2024    BILT 0.6 08/08/2024    TP 7.5 08/08/2024    ALB 4.4 08/08/2024    GLOBULIN 3.1 08/08/2024    AGRATIO 1.4 12/21/2019     08/08/2024    K 3.8 08/08/2024     08/08/2024    CO2 26.0 08/08/2024     No results found for: \"PTP\", \"PT\", \"INR\"  Lab Results   Component Value Date    VITD 30.8 08/18/2017              [1] No Known Allergies

## 2025-03-27 ENCOUNTER — HOSPITAL ENCOUNTER (OUTPATIENT)
Age: 35
Discharge: HOME | End: 2025-03-27
Payer: COMMERCIAL

## 2025-03-27 ENCOUNTER — APPOINTMENT (OUTPATIENT)
Dept: ULTRASOUND IMAGING | Age: 35
End: 2025-03-27
Attending: NURSE PRACTITIONER
Payer: COMMERCIAL

## 2025-03-27 ENCOUNTER — NURSE TRIAGE (OUTPATIENT)
Dept: INTERNAL MEDICINE CLINIC | Facility: CLINIC | Age: 35
End: 2025-03-27

## 2025-03-27 VITALS
RESPIRATION RATE: 20 BRPM | HEART RATE: 96 BPM | TEMPERATURE: 98 F | OXYGEN SATURATION: 99 % | DIASTOLIC BLOOD PRESSURE: 87 MMHG | SYSTOLIC BLOOD PRESSURE: 115 MMHG

## 2025-03-27 DIAGNOSIS — M79.604 PAIN OF RIGHT LOWER EXTREMITY: Primary | ICD-10-CM

## 2025-03-27 PROCEDURE — 99214 OFFICE O/P EST MOD 30 MIN: CPT

## 2025-03-27 PROCEDURE — 93971 EXTREMITY STUDY: CPT | Performed by: NURSE PRACTITIONER

## 2025-03-27 NOTE — ED PROVIDER NOTES
Patient Seen in: [facility] [place]      History     Chief Complaint   Patient presents with    Cramping     Stated Complaint: Right Calf Cramps    Subjective:   HPI    34-year-old female presents with right calf pain and cramping.  She denies chest pain or feeling short of breath.  She does have hormonal birth control implant.  She has had no long recent travel.      Objective:     Past Medical History:    Anxiety state, unspecified    Fibroids    Human papilloma virus infection    Pap smear for cervical cancer screening    wnl              Past Surgical History:   Procedure Laterality Date    Colposcopy, cervix w/upper adjacent vagina; w/biopsy(s), cervix  2013    Other surgical history  2007    LEFT BREAST CYST REMOVED    Other surgical history  2013    Lasik                Social History     Socioeconomic History    Marital status: Single   Tobacco Use    Smoking status: Never    Smokeless tobacco: Never   Vaping Use    Vaping status: Never Used   Substance and Sexual Activity    Alcohol use: Yes     Alcohol/week: 0.0 standard drinks of alcohol     Comment: ONCE EVERY 2-3 MONTHS    Drug use: No    Sexual activity: Yes     Partners: Male     Birth control/protection: Implant   Other Topics Concern    Caffeine Concern Yes     Comment: soda    Exercise No    Seat Belt Yes    History of tanning Yes    Reaction to local anesthetic No    Pt has a pacemaker No    Pt has a defibrillator No              Review of Systems    Positive for stated complaint: Right Calf Cramps  Other systems are as noted in HPI.  Constitutional and vital signs reviewed.      All other systems reviewed and negative except as noted above.    Physical Exam     ED Triage Vitals [03/27/25 1255]   /87   Pulse 96   Resp 20   Temp 98.1 °F (36.7 °C)   Temp src Oral   SpO2 99 %   O2 Device None (Room air)       Current Vitals:   Vital Signs  BP: 115/87  Pulse: 96  Resp: 20  Temp: 98.1 °F (36.7 °C)  Temp src: Oral    Oxygen Therapy  SpO2: 99  %  O2 Device: None (Room air)        Physical Exam  Vitals reviewed.   Constitutional:       General: She is not in acute distress.     Appearance: She is not ill-appearing.   Cardiovascular:      Rate and Rhythm: Normal rate and regular rhythm.   Pulmonary:      Effort: Pulmonary effort is normal.      Breath sounds: Normal breath sounds.   Musculoskeletal:         General: Tenderness present. No swelling, deformity or signs of injury.        Legs:       Comments: + tenderness   neg swelling, neg erythema    Skin:     General: Skin is warm and dry.   Neurological:      General: No focal deficit present.      Mental Status: She is alert and oriented to person, place, and time.   Psychiatric:         Mood and Affect: Mood normal.         Behavior: Behavior normal.             ED Course   Labs Reviewed - No data to display  US VENOUS DOPPLER LEG RIGHT - DIAG IMG (CPT=93971)          PROCEDURE: US VENOUS DOPPLER LEG RIGHT-DIAG IMG (CPT=93971)     COMPARISON: None.     INDICATIONS: Right Calf Cramps     TECHNIQUE: Color duplex Doppler venous ultrasound of the right lower extremity was performed in the usual manner.     FINDINGS: The femoral and popliteal veins appear normal.  Normal flow was demonstrated with color and pulsed Doppler.  Visualized portions of the great and small saphenous, posterior tibial, and peroneal veins appear normal.       THROMBI: None visible.  COMPRESSIBILITY: Normal.  OTHER: Negative.              =====  CONCLUSION:      No DVT within the right lower extremity.             Dictated by (CST): Bryant Plummer MD on 3/27/2025 at 1:46 PM       Finalized by (CST): Bryant Plummer MD on 3/27/2025 at 1:46 PM                               [facility]              Medical Decision Making  Amount and/or Complexity of Data Reviewed  Radiology: ordered.     Details: R lower leg US images reviewed by IC provider.  Shows neg DVT    Risk  OTC drugs.        Disposition and Plan     Clinical Impression:  1. Pain of right  lower extremity         Disposition:  Discharge  3/27/2025  1:52 pm    Follow-up:  Armando Arenas MD  [address] [place] [address] [phone]      If symptoms worsen          Medications Prescribed:  Discharge Medication List as of 3/27/2025  2:10 PM              Supplementary Documentation:

## 2025-03-27 NOTE — TELEPHONE ENCOUNTER
See [place] 3/27/25, pt mentions calf pain    \"I was wondering if I could have my labs draw Friday or Monday prior to my appointment so that we can discuss them during the visit. I would like the standard labs as well as to check if I’m pre diabetic or diabetic. Also my right leg has been having calf cramps is there a blood test for that as well?\"

## 2025-03-27 NOTE — TELEPHONE ENCOUNTER
Called patient,verified name and date of birth.   Reviewed recommendation from SHERMAN Carrera's 3/27/25 note below to go to ER or Immediate care for evaluation now.  Patient agrees to go to  [place] [facility] where she works.  Patient verbalizes understanding and agrees to plan of care.

## 2025-03-27 NOTE — TELEPHONE ENCOUNTER
Action Requested: Summary for Provider     []  Critical Lab, Recommendations Needed  [x] Need Additional Advice  []   FYI    []   Need Orders  [] Need Medications Sent to Pharmacy  []  Other     SUMMARY: Disposition per protocol  is to be seen in office today.  No appointments available at [facility] in that time frame.  Patient has physical exam scheduled with Mikki Carrera next week:  Future Appointments   Date Time Provider Department Center   4/1/2025 10:20 AM Mikki Carrera APRN [id number]     Cramping in right calf   throughout the day a few times per week x 1 month.  Last episode of calf cramping was Monday. Cramping is relieved by stretching.  Frequency of Cramping has decreased since increasing fluid intake with Liquid IV.  Denies redness, swelling,tenderness, warmth in calf  Current  left hip labrum tear with pending repair but is walking normally at her normal activity level.  .  History of  Right ankle arthroscopy in 2020. Nexplanon implant in place.    SHERMAN Carrera Please Advise: Patient asks if you would consider ordering labs prior to her upcoming physical with you on  4/1/25  look for dehydration or electrolyte imbalance?  Or do you recommend immediate care?      Reason for call: Leg Pain  Onset: 1 month      Patient calling, back regarding calf pain,  verified name and date of birth. She is requesting lab orders prior to physical to look for dehydration or electrolyte imbalance. Patient advised to call back if calf cramping worsens or new symptoms such as redness or swelling occur. Patient verbalizes understanding and agrees to plan of care.      Reason for Disposition   MODERATE pain (e.g., interferes with normal activities, limping) and present > 3 days    Protocols used: Leg Pain-A-OH

## 2025-03-27 NOTE — TELEPHONE ENCOUNTER
Left message to call office back on voicemail confirmed #[phone], left call back number and hours.    Attempt #1

## 2025-04-01 ENCOUNTER — LAB ENCOUNTER (OUTPATIENT)
Dept: LAB | Age: 35
End: 2025-04-01
Attending: NURSE PRACTITIONER
Payer: COMMERCIAL

## 2025-04-01 ENCOUNTER — OFFICE VISIT (OUTPATIENT)
Dept: INTERNAL MEDICINE CLINIC | Facility: CLINIC | Age: 35
End: 2025-04-01
Payer: COMMERCIAL

## 2025-04-01 VITALS
HEIGHT: 61.5 IN | WEIGHT: 220 LBS | SYSTOLIC BLOOD PRESSURE: 123 MMHG | HEART RATE: 76 BPM | BODY MASS INDEX: 41.01 KG/M2 | DIASTOLIC BLOOD PRESSURE: 85 MMHG

## 2025-04-01 DIAGNOSIS — R25.2 LEG CRAMPING: ICD-10-CM

## 2025-04-01 DIAGNOSIS — G43.009 MIGRAINE WITHOUT AURA AND WITHOUT STATUS MIGRAINOSUS, NOT INTRACTABLE: ICD-10-CM

## 2025-04-01 DIAGNOSIS — Z83.3 FAMILY HISTORY OF DIABETES MELLITUS: ICD-10-CM

## 2025-04-01 DIAGNOSIS — Z00.00 WELLNESS EXAMINATION: ICD-10-CM

## 2025-04-01 DIAGNOSIS — Z13.0 SCREENING FOR DEFICIENCY ANEMIA: ICD-10-CM

## 2025-04-01 DIAGNOSIS — Z13.220 LIPID SCREENING: ICD-10-CM

## 2025-04-01 DIAGNOSIS — Z00.00 WELLNESS EXAMINATION: Primary | ICD-10-CM

## 2025-04-01 DIAGNOSIS — Z13.29 THYROID DISORDER SCREEN: ICD-10-CM

## 2025-04-01 DIAGNOSIS — J45.20 MILD INTERMITTENT ASTHMA WITHOUT COMPLICATION (HCC): ICD-10-CM

## 2025-04-01 LAB
ALBUMIN SERPL-MCNC: 4.5 G/DL (ref 3.2–4.8)
ALBUMIN/GLOB SERPL: 1.6 {RATIO} (ref 1–2)
ALP LIVER SERPL-CCNC: 95 U/L
ALT SERPL-CCNC: 21 U/L
ANION GAP SERPL CALC-SCNC: 6 MMOL/L (ref 0–18)
AST SERPL-CCNC: 16 U/L (ref ?–34)
BASOPHILS # BLD AUTO: 0.04 X10(3) UL (ref 0–0.2)
BASOPHILS NFR BLD AUTO: 0.5 %
BILIRUB SERPL-MCNC: 0.6 MG/DL (ref 0.3–1.2)
BUN BLD-MCNC: 10 MG/DL (ref 9–23)
BUN/CREAT SERPL: 11.9 (ref 10–20)
CALCIUM BLD-MCNC: 9 MG/DL (ref 8.7–10.4)
CHLORIDE SERPL-SCNC: 106 MMOL/L (ref 98–112)
CHOLEST SERPL-MCNC: 177 MG/DL (ref ?–200)
CO2 SERPL-SCNC: 25 MMOL/L (ref 21–32)
CREAT BLD-MCNC: 0.84 MG/DL
DEPRECATED RDW RBC AUTO: 44.9 FL (ref 35.1–46.3)
EGFRCR SERPLBLD CKD-EPI 2021: 93 ML/MIN/1.73M2 (ref 60–?)
EOSINOPHIL # BLD AUTO: 0.16 X10(3) UL (ref 0–0.7)
EOSINOPHIL NFR BLD AUTO: 2.2 %
ERYTHROCYTE [DISTWIDTH] IN BLOOD BY AUTOMATED COUNT: 14.2 % (ref 11–15)
EST. AVERAGE GLUCOSE BLD GHB EST-MCNC: 117 MG/DL (ref 68–126)
FASTING PATIENT LIPID ANSWER: YES
FASTING STATUS PATIENT QL REPORTED: YES
GLOBULIN PLAS-MCNC: 2.8 G/DL (ref 2–3.5)
GLUCOSE BLD-MCNC: 97 MG/DL (ref 70–99)
HBA1C MFR BLD: 5.7 % (ref ?–5.7)
HCT VFR BLD AUTO: 37.9 %
HDLC SERPL-MCNC: 35 MG/DL (ref 40–59)
HGB BLD-MCNC: 12.6 G/DL
IMM GRANULOCYTES # BLD AUTO: 0.02 X10(3) UL (ref 0–1)
IMM GRANULOCYTES NFR BLD: 0.3 %
LDLC SERPL CALC-MCNC: 118 MG/DL (ref ?–100)
LYMPHOCYTES # BLD AUTO: 2.41 X10(3) UL (ref 1–4)
LYMPHOCYTES NFR BLD AUTO: 32.8 %
MAGNESIUM SERPL-MCNC: 2 MG/DL (ref 1.6–2.6)
MCH RBC QN AUTO: 28.6 PG (ref 26–34)
MCHC RBC AUTO-ENTMCNC: 33.2 G/DL (ref 31–37)
MCV RBC AUTO: 86.1 FL
MONOCYTES # BLD AUTO: 0.44 X10(3) UL (ref 0.1–1)
MONOCYTES NFR BLD AUTO: 6 %
NEUTROPHILS # BLD AUTO: 4.27 X10 (3) UL (ref 1.5–7.7)
NEUTROPHILS # BLD AUTO: 4.27 X10(3) UL (ref 1.5–7.7)
NEUTROPHILS NFR BLD AUTO: 58.2 %
NONHDLC SERPL-MCNC: 142 MG/DL (ref ?–130)
OSMOLALITY SERPL CALC.SUM OF ELEC: 283 MOSM/KG (ref 275–295)
PLATELET # BLD AUTO: 271 10(3)UL (ref 150–450)
POTASSIUM SERPL-SCNC: 3.9 MMOL/L (ref 3.5–5.1)
PROT SERPL-MCNC: 7.3 G/DL (ref 5.7–8.2)
RBC # BLD AUTO: 4.4 X10(6)UL
SODIUM SERPL-SCNC: 137 MMOL/L (ref 136–145)
TRIGL SERPL-MCNC: 131 MG/DL (ref 30–149)
TSI SER-ACNC: 0.92 UIU/ML (ref 0.55–4.78)
VLDLC SERPL CALC-MCNC: 23 MG/DL (ref 0–30)
WBC # BLD AUTO: 7.3 X10(3) UL (ref 4–11)

## 2025-04-01 PROCEDURE — 80061 LIPID PANEL: CPT

## 2025-04-01 PROCEDURE — 85025 COMPLETE CBC W/AUTO DIFF WBC: CPT

## 2025-04-01 PROCEDURE — 99213 OFFICE O/P EST LOW 20 MIN: CPT | Performed by: NURSE PRACTITIONER

## 2025-04-01 PROCEDURE — 84443 ASSAY THYROID STIM HORMONE: CPT

## 2025-04-01 PROCEDURE — 99395 PREV VISIT EST AGE 18-39: CPT | Performed by: NURSE PRACTITIONER

## 2025-04-01 PROCEDURE — 83735 ASSAY OF MAGNESIUM: CPT

## 2025-04-01 PROCEDURE — 36415 COLL VENOUS BLD VENIPUNCTURE: CPT

## 2025-04-01 PROCEDURE — 80053 COMPREHEN METABOLIC PANEL: CPT

## 2025-04-01 PROCEDURE — 83036 HEMOGLOBIN GLYCOSYLATED A1C: CPT

## 2025-04-01 RX ORDER — ONDANSETRON 4 MG/1
4 TABLET, ORALLY DISINTEGRATING ORAL EVERY 8 HOURS PRN
Qty: 30 TABLET | Refills: 2 | Status: SHIPPED | OUTPATIENT
Start: 2025-04-01

## 2025-04-01 RX ORDER — LEVOCETIRIZINE DIHYDROCHLORIDE 5 MG/1
5 TABLET, FILM COATED ORAL EVERY EVENING
Qty: 90 TABLET | Refills: 1 | Status: SHIPPED | OUTPATIENT
Start: 2025-04-01

## 2025-04-01 RX ORDER — MONTELUKAST SODIUM 10 MG/1
10 TABLET ORAL NIGHTLY
Qty: 90 TABLET | Refills: 3 | Status: SHIPPED | OUTPATIENT
Start: 2025-04-01

## 2025-04-01 RX ORDER — ALBUTEROL SULFATE 90 UG/1
2 INHALANT RESPIRATORY (INHALATION) EVERY 8 HOURS PRN
Qty: 1 EACH | Refills: 3 | Status: SHIPPED | OUTPATIENT
Start: 2025-04-01

## 2025-04-01 NOTE — PROGRESS NOTES
Angela Chavez is a 34 year old female.  HPI:   Patient presents to clinic for annual exam and medication refill.  History of asthma, controlled however exacerbated with illness.  Last exacerbation was December.  Denies any recent exacerbations.  Using albuterol as needed and montelukast.  Using Xyzal as well as needed which has helped control symptoms.  History of migraines, using ondansetron as needed.  Denies any new or worsening symptoms, request refill on nausea medication to have on hand.  She would also like testing for magnesium level and A1c.  Family history of diabetes reported she also reports chronic bilateral calf cramps.  Recently worsened and went to the urgent care.  Ultrasound of the right lower extremity was negative for DVT however reports she was recommended to get magnesium level tested.  She reports using liquid IV and then symptoms resolved and has not had any cramping in her legs since.  She does endorse drinking very little water and drinks coffee daily.  Denies any chest pain, shortness of breath, dizziness, nausea, vomiting, diarrhea, constipation, appetite or weight changes photophobia numbness or tingling.  Current Outpatient Medications   Medication Sig Dispense Refill    albuterol (PROAIR HFA) 108 (90 Base) MCG/ACT Inhalation Aero Soln Inhale 2 puffs into the lungs every 8 (eight) hours as needed for Wheezing. 1 each 3    montelukast 10 MG Oral Tab Take 1 tablet (10 mg total) by mouth nightly. 90 tablet 3    ondansetron 4 MG Oral Tablet Dispersible Take 1 tablet (4 mg total) by mouth every 8 (eight) hours as needed for Nausea. 30 tablet 2    levocetirizine 5 MG Oral Tab Take 1 tablet (5 mg total) by mouth every evening. 90 tablet 1    meclizine 25 MG Oral Tab Take 1 tablet (25 mg total) by mouth as needed.      fluocinonide 0.05 % External Ointment Apply 1 Application. topically 2 (two) times daily. 60 g 2    Meloxicam 15 MG Oral Tab Take 1 tablet (15 mg total) by mouth daily.  (Patient not taking: Reported on 4/1/2025) 14 tablet 0      Past Medical History:    Allergic rhinitis    Anxiety state, unspecified    Asthma (HCC)    Esophageal reflux    Fibroids    Human papilloma virus infection    Pap smear for cervical cancer screening    wnl      Social History:  Social History     Socioeconomic History    Marital status: Single   Tobacco Use    Smoking status: Never    Smokeless tobacco: Never   Vaping Use    Vaping status: Never Used   Substance and Sexual Activity    Alcohol use: Not Currently     Comment: ONCE EVERY 2-3 MONTHS    Drug use: No    Sexual activity: Yes     Partners: Male     Birth control/protection: Implant   Other Topics Concern    Caffeine Concern Yes     Comment: soda    Exercise No    Seat Belt Yes    History of tanning Yes    Reaction to local anesthetic No    Pt has a pacemaker No    Pt has a defibrillator No        REVIEW OF SYSTEMS:   Review of Systems   Constitutional:  Negative for activity change, appetite change, fatigue, fever and unexpected weight change.   HENT:  Negative for congestion, dental problem and sore throat.    Eyes:  Negative for visual disturbance.   Respiratory:  Negative for cough, chest tightness, shortness of breath and wheezing.    Cardiovascular:  Negative for chest pain, palpitations and leg swelling.   Gastrointestinal:  Negative for abdominal pain, constipation, diarrhea, nausea and vomiting.   Endocrine: Negative.    Genitourinary:  Negative for difficulty urinating, frequency and menstrual problem.   Musculoskeletal:  Negative for arthralgias and back pain.   Skin:  Negative for color change, pallor, rash and wound.   Neurological:  Negative for dizziness, seizures, light-headedness, numbness and headaches.   Psychiatric/Behavioral:  Negative for behavioral problems, dysphoric mood and suicidal ideas. The patient is not nervous/anxious.           EXAM:   /85 (BP Location: Right arm, Patient Position: Sitting, Cuff Size: adult)    Pulse 76   Ht 5' 1.5\" (1.562 m)   Wt 220 lb (99.8 kg)   BMI 40.90 kg/m²     Physical Exam  Vitals reviewed.   Constitutional:       General: She is not in acute distress.     Appearance: Normal appearance. She is obese. She is not ill-appearing.   HENT:      Head: Normocephalic and atraumatic.      Right Ear: Tympanic membrane, ear canal and external ear normal.      Left Ear: Tympanic membrane, ear canal and external ear normal.      Nose: Nose normal.      Mouth/Throat:      Pharynx: Oropharynx is clear.   Eyes:      General: No scleral icterus.        Right eye: No discharge.         Left eye: No discharge.      Extraocular Movements: Extraocular movements intact.      Conjunctiva/sclera: Conjunctivae normal.      Pupils: Pupils are equal, round, and reactive to light.   Neck:      Thyroid: No thyroid mass or thyromegaly.   Cardiovascular:      Rate and Rhythm: Normal rate and regular rhythm.      Pulses: Normal pulses.      Heart sounds: Normal heart sounds.   Pulmonary:      Effort: Pulmonary effort is normal. No respiratory distress.      Breath sounds: Normal breath sounds.   Abdominal:      General: Abdomen is flat. Bowel sounds are normal. There is no distension.      Palpations: Abdomen is soft. There is no mass.      Tenderness: There is no abdominal tenderness.   Musculoskeletal:         General: Normal range of motion.      Cervical back: Normal range of motion and neck supple.      Right lower leg: No edema.      Left lower leg: No edema.   Lymphadenopathy:      Cervical: No cervical adenopathy.   Skin:     General: Skin is warm and dry.      Coloration: Skin is not jaundiced.   Neurological:      General: No focal deficit present.      Mental Status: She is alert and oriented to person, place, and time.      Motor: Motor function is intact.      Gait: Gait normal.   Psychiatric:         Mood and Affect: Mood normal.         Judgment: Judgment normal.            ASSESSMENT AND PLAN:     Assessment  & Plan  Wellness examination  Education provided on healthy lifestyle.  Diet: reduce saturated fats, simple carbs and excess sugars. Hydrate. Leafy greens, legumes, nuts/seeds, healthy sources of Omega 3, lean proteins, complex carbs, berries.   Exercise 30 min daily cardio as tolerated.   Immunizations reviewed and recommendations provided  Preventative health screening recommendations reviewed.   Previous lab and imaging results reviewed.    Orders:    Comp Metabolic Panel (14); Future    CBC With Differential With Platelet; Future    Lipid Panel; Future    TSH W Reflex To Free T4; Future    Lipid screening    Orders:    Lipid Panel; Future    Screening for deficiency anemia    Orders:    CBC With Differential With Platelet; Future    Thyroid disorder screen    Orders:    TSH W Reflex To Free T4; Future    Leg cramping  -sx have resolved, advised to monitor.   -US RLE negative for DVT.   -Limit caffeine intake, increase hydration at least 64 ounces water daily. Stretching. Supportive shoes.   -Will check magnesium level per patient request.   Orders:    Magnesium [E]; Future    Family history of diabetes mellitus  -will check A1c  Orders:    Hemoglobin A1C [E]; Future    Mild intermittent asthma without complication (HCC)  -Stable, CPM. Refilled montelukast and albuterol. Reviewed use.  Orders:    albuterol (PROAIR HFA) 108 (90 Base) MCG/ACT Inhalation Aero Soln; Inhale 2 puffs into the lungs every 8 (eight) hours as needed for Wheezing.    montelukast 10 MG Oral Tab; Take 1 tablet (10 mg total) by mouth nightly.    levocetirizine 5 MG Oral Tab; Take 1 tablet (5 mg total) by mouth every evening.    Migraine without aura and without status migrainosus, not intractable  Stable, CPM. Refills provided.   Orders:    ondansetron 4 MG Oral Tablet Dispersible; Take 1 tablet (4 mg total) by mouth every 8 (eight) hours as needed for Nausea.         The patient indicates understanding of these issues and agrees to the  plan.  The patient is asked to return in 3 months with PCP.     The above note was creating using Dragon speech recognition technology. Please excuse any typos.

## 2025-04-01 NOTE — ASSESSMENT & PLAN NOTE
Stable, CPM. Refills provided.   Orders:    ondansetron 4 MG Oral Tablet Dispersible; Take 1 tablet (4 mg total) by mouth every 8 (eight) hours as needed for Nausea.

## 2025-04-28 ENCOUNTER — HOSPITAL ENCOUNTER (OUTPATIENT)
Age: 35
Discharge: HOME | End: 2025-04-28
Payer: COMMERCIAL

## 2025-04-28 VITALS
HEART RATE: 95 BPM | TEMPERATURE: 98 F | RESPIRATION RATE: 16 BRPM | DIASTOLIC BLOOD PRESSURE: 87 MMHG | OXYGEN SATURATION: 96 % | SYSTOLIC BLOOD PRESSURE: 139 MMHG

## 2025-04-28 DIAGNOSIS — J98.01 BRONCHOSPASM: ICD-10-CM

## 2025-04-28 DIAGNOSIS — J01.40 ACUTE NON-RECURRENT PANSINUSITIS: Primary | ICD-10-CM

## 2025-04-28 LAB — S PYO AG THROAT QL IA.RAPID: NEGATIVE

## 2025-04-28 PROCEDURE — 99214 OFFICE O/P EST MOD 30 MIN: CPT

## 2025-04-28 PROCEDURE — 87651 STREP A DNA AMP PROBE: CPT | Performed by: EMERGENCY MEDICINE

## 2025-04-28 PROCEDURE — 99213 OFFICE O/P EST LOW 20 MIN: CPT

## 2025-04-28 RX ORDER — PREDNISONE 20 MG/1
TABLET ORAL
Qty: 12 TABLET | Refills: 0 | Status: SHIPPED | OUTPATIENT
Start: 2025-05-03 | End: 2025-05-10

## 2025-04-28 RX ORDER — ALBUTEROL SULFATE 90 UG/1
INHALANT RESPIRATORY (INHALATION)
Qty: 1 EACH | Refills: 0 | Status: SHIPPED | OUTPATIENT
Start: 2025-04-28

## 2025-04-28 RX ORDER — AZITHROMYCIN 250 MG/1
TABLET, FILM COATED ORAL
Qty: 6 TABLET | Refills: 0 | Status: SHIPPED | OUTPATIENT
Start: 2025-04-28 | End: 2025-05-03

## 2025-04-28 RX ORDER — BENZONATATE 100 MG/1
100 CAPSULE ORAL 3 TIMES DAILY PRN
Qty: 30 CAPSULE | Refills: 0 | Status: SHIPPED | OUTPATIENT
Start: 2025-04-28

## 2025-04-28 NOTE — ED INITIAL ASSESSMENT (HPI)
Patient reports over 1 week hx of sinus congestion, post nasal drip.  Developed voice hoarseness over the weekend.    
DISPLAY PLAN FREE TEXT

## 2025-04-28 NOTE — DISCHARGE INSTRUCTIONS
4 prescriptions were sent to the pharmacy as discussed.  You do not have to  the inhaler if you have a full inhaler at home.  Azithromycin otherwise known as Z-Luis Felipe take as directed.  This is an antibiotic.  This is taken for 5 days, but works for 10 days.   Tessalon Perles otherwise known as benzonatate take as directed.  Take this in between other cold medication such as Mucinex.   Wait-and-see prednisone.  I know we discussed 5 days, but I sent 7 days then.  40 mg for 5 days, then 20 mg for 2 days.  Make sure that you boost your immune system while taking this medication. It can decrease your immune system when you are actively taking this.   Albuterol metered-dose inhaler.  Inhale 1 puff hold your breath for 10 seconds and then exhale.  Wait 60 seconds, and do the same for the second puff.  If needed you can do a 3rd, and 4th puff as long as that they are 60 seconds apart.   Primary care follow-up as needed, go to the ER for any new or worsening symptoms

## 2025-04-28 NOTE — ED PROVIDER NOTES
Patient Seen in: [facility]      History     Chief Complaint   Patient presents with    Sinus Problem     Stated Complaint: possible sinus    Subjective:   HPI  Angela Chavez is a 34 year old female here for sinus symptoms that are getting worse, and cough ischemic up at nighttime.  Symptoms started over a week ago.  OTC measures with minimal relief.  No unilateral weakness, or syncope.  No acute distress.     History of Present Illness               Objective:     Past Medical History:    Allergic rhinitis    Anxiety state, unspecified    Asthma (HCC)    Esophageal reflux    Fibroids    Human papilloma virus infection    Pap smear for cervical cancer screening    wnl              Past Surgical History:   Procedure Laterality Date    Colposcopy, cervix w/upper adjacent vagina; w/biopsy(s), cervix  2013    Other surgical history  2007    LEFT BREAST CYST REMOVED    Other surgical history  2013    Lasik                Social History     Socioeconomic History    Marital status: Single   Tobacco Use    Smoking status: Never    Smokeless tobacco: Never   Vaping Use    Vaping status: Never Used   Substance and Sexual Activity    Alcohol use: Not Currently     Comment: ONCE EVERY 2-3 MONTHS    Drug use: No    Sexual activity: Yes     Partners: Male     Birth control/protection: Implant   Other Topics Concern    Caffeine Concern Yes     Comment: soda    Exercise No    Seat Belt Yes    History of tanning Yes    Reaction to local anesthetic No    Pt has a pacemaker No    Pt has a defibrillator No              Review of Systems    Positive for stated complaint: possible sinus  Other systems are as noted in HPI.  Constitutional and vital signs reviewed.      All other systems reviewed and negative except as noted above.                  Physical Exam     ED Triage Vitals [04/28/25 1432]   /87   Pulse 95   Resp 16   Temp 98 °F (36.7 °C)   Temp src Oral   SpO2 96 %   O2 Device None (Room air)       Current  Vitals:   Vital Signs  BP: 139/87  Pulse: 95  Resp: 16  Temp: 98 °F (36.7 °C)  Temp src: Oral    Oxygen Therapy  SpO2: 96 %  O2 Device: None (Room air)        Physical Exam  Vitals and nursing note reviewed.   Constitutional:       General: She is not in acute distress.     Appearance: Normal appearance. She is not toxic-appearing or diaphoretic.   HENT:      Head: Normocephalic.      Nose: Congestion and rhinorrhea present.      Mouth/Throat:      Mouth: Mucous membranes are moist.      Pharynx: Oropharynx is clear.      Comments: Postnasal drip present with cobblestone appearance.  No beefy redness, oral petechiae, or exudates  Eyes:      Extraocular Movements: Extraocular movements intact.      Conjunctiva/sclera: Conjunctivae normal.      Pupils: Pupils are equal, round, and reactive to light.   Cardiovascular:      Rate and Rhythm: Normal rate.   Pulmonary:      Effort: Pulmonary effort is normal. No respiratory distress.      Breath sounds: Rhonchi present. No wheezing.   Musculoskeletal:      Cervical back: Normal range of motion.   Neurological:      General: No focal deficit present.      Mental Status: She is alert.   Psychiatric:         Mood and Affect: Mood normal.         Behavior: Behavior normal.         Thought Content: Thought content normal.         Judgment: Judgment normal.           Physical Exam                ED Course     Labs Reviewed   RAPID STREP A - Normal          Results                                 MDM            Medical Decision Making    Ddx: Sinusitis, allergies, reactive, atypical pneumonia, COVID, FLU, RSV, strep, or somatic causes of symptoms    Tx for: Bronchospasm with sinusitis.  Medication sent to the pharmacy take as directed.  Wait-and-see prednisone as discussed.  Supportive care include but not limited to otc medications if there is no contraindication, cool mist humidifier, and oral hydration.    Avoid dairy if possible; This increases mucus production.  No stridor,  No hot muffled speech, and no signs of compromise. Tolerating PO. Neuro wnl.   Reinforced ER precautions, and f/u care as needed. All questions answered, and reassurance given. No acute distress and cleared for home.      Problems Addressed:  Acute non-recurrent pansinusitis: acute illness or injury  Bronchospasm: acute illness or injury    Amount and/or Complexity of Data Reviewed  External Data Reviewed: notes.  Labs: ordered. Decision-making details documented in ED Course.     Details: Independent interpretation. Reviewed with patient    Risk  OTC drugs.  Prescription drug management.        Disposition and Plan     Clinical Impression:  1. Acute non-recurrent pansinusitis    2. Bronchospasm         Disposition:  Discharge  4/28/2025  3:17 pm    Follow-up:  Armando Arenas MD  [address] [place] [address]  [phone]                Medications Prescribed:  Discharge Medication List as of 4/28/2025  3:19 PM        START taking these medications    Details   azithromycin (ZITHROMAX Z-GENNA) 250 MG Oral Tab 500 mg once followed by 250 mg daily x 4 days, Normal, Disp-6 tablet, R-0      benzonatate 100 MG Oral Cap Take 1 capsule (100 mg total) by mouth 3 (three) times daily as needed for cough., Normal, Disp-30 capsule, R-0NPI [id number].  Collaborating physician Raquel Howell.      !! albuterol 108 (90 Base) MCG/ACT Inhalation Aero Soln Inhale 1 puff and hold breath for 10 seconds then exhale.  Wait 1 full minute and repeat for second puff.  Use every 4-6 hours as needed., Normal, Disp-1 each, R-0NPI [id number]. Collaborating MD Raquel Howell.      predniSONE 20 MG Oral Tab Take 2 tablets (40 mg total) by mouth daily for 5 days, THEN 1 tablet (20 mg total) daily for 2 days., Normal, Disp-12 tablet, R-0       !! - Potential duplicate medications found. Please discuss with provider.          Supplementary Documentation:

## 2025-05-06 ENCOUNTER — TELEPHONE (OUTPATIENT)
Dept: ORTHOPEDICS CLINIC | Facility: CLINIC | Age: 35
End: 2025-05-06

## 2025-05-06 DIAGNOSIS — M25.852 FEMOROACETABULAR IMPINGEMENT OF LEFT HIP: Primary | ICD-10-CM

## 2025-05-06 NOTE — TELEPHONE ENCOUNTER
Patient scheduled for Left hip arthroscopic labral repair and femoral osteochondroplasty on 6/24/25  LOV 3/19/25

## 2025-05-06 NOTE — TELEPHONE ENCOUNTER
Patient stated that provider mentioned ordering a CT prior to surgery.    Please advise if provider would still like for patient to complete CT.

## 2025-05-07 RX ORDER — MELOXICAM 15 MG/1
15 TABLET ORAL DAILY
Qty: 30 TABLET | Refills: 0 | Status: SHIPPED | OUTPATIENT
Start: 2025-05-07

## 2025-05-07 NOTE — TELEPHONE ENCOUNTER
Called patient and informed her CT imaging not needed as Xray and MRI sufficient.   Patient states she had left hip injection on 2/6/25 by Dr Shah and it started to wear off. She is requesting a prescription for Meloxicam to use occasionally for pain until surgery. Did advise no NSAIDs 2 wks prior to surgery. Ok to take OTC tylenol per label directions prior to surgery if needed.

## 2025-05-09 ENCOUNTER — TELEPHONE (OUTPATIENT)
Dept: ORTHOPEDICS CLINIC | Facility: CLINIC | Age: 35
End: 2025-05-09

## 2025-05-09 ENCOUNTER — PATIENT MESSAGE (OUTPATIENT)
Dept: ORTHOPEDICS CLINIC | Facility: CLINIC | Age: 35
End: 2025-05-09

## 2025-05-12 NOTE — TELEPHONE ENCOUNTER
Disab forms received via fax at the [facility] office.    Unable to scan and email to forms dept (IT issues), originals sent via interoffice mail.

## 2025-05-16 NOTE — TELEPHONE ENCOUNTER
Disability forms received via email logged as 2-2, placed in my inbox. [employer] message sent to patient for Release of Information completion

## 2025-05-21 NOTE — TELEPHONE ENCOUNTER
Patient called for help filling out the questionnaire. I advised the 10-12 business days for the forms. Patient verbally understood

## 2025-05-22 NOTE — TELEPHONE ENCOUNTER
Dr. Castaneda,    Please sign off on form if you agree to: disability due to left hip /arthroscopic start 6/24/25-end: 7/27/25,rtw 7/28/25    -Signature page will be the first page scanned  -From your Inbasket, Highlight the patient and click Chart   -Double click the 5/9/25 Forms Completion telephone encounter  -Scroll down to the Media section   -Click the blue Hyperlink: disability Dr. Castaneda 5/22/25  -Click Acknowledge located in the top right ribbon/menu   -Drag the mouse into the blank space of the document and a + sign will appear. Left click to   electronically sign the document.  -Once signed, simply exit out of the screen and you signature will be saved.     Thank you,     Sherron

## 2025-05-22 NOTE — TELEPHONE ENCOUNTER
Called patient to obtain details on disability forms, patient completed valid Release of Information and would like a copy of forms as well.  Type of Leave: disability   Reason for Leave: sx  Start date of leave: 6/24/25  End date of leave:  7/27/25,Return to work 7/28/25  How many flare ups per month/length?:  How many appts per month/length?:   Was Fee and Turnaround info Given?: yes

## 2025-05-23 NOTE — TELEPHONE ENCOUNTER
Forms completed and efaxed to [employer] at [phone],efax confirmation received. [employer] message sent to patient.

## 2025-06-05 ENCOUNTER — NURSE ONLY (OUTPATIENT)
Dept: INTERNAL MEDICINE CLINIC | Facility: HOSPITAL | Age: 35
End: 2025-06-05
Attending: PREVENTIVE MEDICINE

## 2025-06-05 DIAGNOSIS — Z00.00 WELLNESS EXAMINATION: Primary | ICD-10-CM

## 2025-06-05 PROCEDURE — 86480 TB TEST CELL IMMUN MEASURE: CPT

## 2025-06-09 LAB
M TB IFN-G CD4+ T-CELLS BLD-ACNC: 0.05 IU/ML
M TB TUBERC IFN-G BLD QL: NEGATIVE
M TB TUBERC IGNF/MITOGEN IGNF CONTROL: >10 IU/ML
QFT TB1 AG MINUS NIL: 0.02 IU/ML
QFT TB2 AG MINUS NIL: 0.01 IU/ML

## 2025-06-10 ENCOUNTER — ORDER TRANSCRIPTION (OUTPATIENT)
Dept: PHYSICAL THERAPY | Facility: HOSPITAL | Age: 35
End: 2025-06-10

## 2025-06-10 DIAGNOSIS — M25.852 FEMOROACETABULAR IMPINGEMENT OF LEFT HIP: Primary | ICD-10-CM

## 2025-06-18 NOTE — DISCHARGE INSTRUCTIONS
Hip Arthroscopy Post-Op Instructions      WEIGHT BEARING: You will be nonweightbearing on your operative leg after surgery. This may be uncomfortable and difficult at first due to pain and weakness in the legs after a major injury. Based on your healing, you and your surgeon will be able to determine when to advance your weightbearing and activities. You should walk at a slow pace and use the assistive devices provided to you by your therapy treatment team.  After you are cleared by your surgeon, you may begin to slowly work on progressing strength in your leg with light resistance exercises.     RANGE OF MOTION: Do Not Extend Leg Past Neutral or Internally Rotate    PAIN MEDICATIONS:   For many people, post-operative pain can be managed with simple measures, such as elevation of the operative extremity above the level of the heart, cryotherapy and ice, compression, etc.   In addition to these measures, we have prescribed pain medications. To minimize narcotic use and establish better pain control, we employ a multimodal pain approach. This protocol combines the use of scheduled acetaminophen, gabapentin, and narcotics.  We will often use anti-inflammatories (NSAID's such as ibuprofen, celecoxib, and/or naproxen) to further assist with pain control thus allowing for a lower dose of narcotic to be used. Dosing of medications will vary from patient to patient based on their needs and past medical history, so please refer to your discharge medication list.  Opiate pain medications (oxycodone) will typically only be refilled after an in-person visit.     ICE PACK: Use frequently over the next 7-10 days.  Ice bags/packs/bladder should be used for 20-30 minutes over the surgical site every 3-4 hours during waking hours. Protect your skin from frostbite with a washcloth, towel, or ace wrap between the ice bag/pack and your skin.     DRESSINGS/WOUND CARE:   You may remove your dressing after 3-4 days. There are  sutures/staples underneath and these will be removed at your postoperative visit.  Follow the bathing instructions below.  If you have increased drainage, incision redness, foul smell, or fevers - inform the office.  You may need to be evaluated in the office earlier than your follow-up appointment.    BATHING:   You should not get the surgical dressings wet unless you have a water-resistant dressing. While you may shower immediately, it is often easier to wait until the dressings are removed to do so. After 3-4 days, when you remove the surgical dressing, you may shower normally.  After removal of the dressing, you may allow warm soapy water to wash over the wound. Try to avoid direct water pressure aimed at your surgical site.  You should keep your surgical site clean and dry when possible until you are seen for your postoperative visit.   Do not soak the wound/incision, use hot tubs or swimming pools, oceans, lakes, ponds until 4 weeks after surgery   Following these guidelines will help avoid any wound healing issues and/or infections.    PHYSICAL/OCCUPATIONAL THERAPY (PT/OT): To maximize surgical benefit, PT/OT is necessary. If you do not have an appointment, you should contact PT/OT to set up an appointment. You should start working with the therapist immediately after surgery. If you have problems setting up PT, please contact our team.    DVT PREVENTION:   Deep vein thrombosis (DVT) or blood clots sometimes occur following surgery. It is important to understand the signs/symptoms and methods to avoid DVTs.   Symptoms of DVT include swelling, throbbing and cramping in the extremity, swollen veins that are hard or sore. DVTs can travel to the lungs and cause a pulmonary embolus (PE) and death. Symptoms of a pulmonary embolus include chest pain and shortness of breath. If you experience any of these symptoms you should contact the clinic immediately and/or go to the nearest emergency room.   To prevent blood  clots, you should move your extremities and joints, limited by the restrictions above. Perform foot pumps, ankle circles, leg lifts, etc. to circulate blood in the extremity. Moving your uninjured extremity is helpful in preventing blood clots even in your injured extremity.  If you have been prescribed an anticoagulant medication, please take it as prescribed for DVT prophylaxis. If you plan to travel in a car or plane for long periods (> 1 hour), contact your surgeon. If you have a history of blood clots, and have not been prescribed a medication, contact your surgeon immediately.     DRIVING: Driving after your surgery is dependent on several factors. You may not drive if you are taking opiate pain medications. You may not drive if you're physically unable to steer with 2 hands and press the brake with full force. It is often more difficult to return to driving if the right leg was injured. If you are unsure whether you are safe to drive, you should visit your local DMV. We are not medically or legally responsible for an accident caused by unsafe driving.     POST-OPERATIVE APPOINTMENT: Your first post-operative appointment is scheduled for 2-3 weeks after the procedure. If you do not have an appointment scheduled yet, please contact our scheduling office.    SPECIAL INSTRUCTIONS: If you experience fevers greater than 100.4°F for two consecutive days or any single temperature greater than 102.2°F, or if you experience chest pain, difficulty breathing, confusion, or an allergic reaction, return to your nearest emergency department as soon as possible.                HOME INSTRUCTIONS  [facility] HOME CARE INSTRUCTIONS: POST-OP ANESTHESIA  The medication that you received for sedation or general anesthesia can last up to 24 hours. Your judgment and reflexes may be altered, even if you feel like your normal self.      We Recommend:   Do not drive any motor vehicle or bicycle   Avoid mowing the lawn, playing sports, or  working with power tools/applicances (power saws, electric knives or mixers)   That you have someone stay with you on your first night home   Do not drink alcohol or take sleeping pills or tranquilizers   Do not sign legal documents within 24 hours of your procedure   If you had a nerve block for your surgery, take extra care not to put any pressure on your arm or hand for 24 hours    It is normal:  For you to have a sore throat if you had a breathing tube during surgery (while you were asleep!). The sore throat should get better within 48 hours. You can gargle with warm salt water (1/2 tsp in 4 oz warm water) or use a throat lozenge for comfort  To feel muscle aches or soreness especially in the abdomen, chest or neck. The achy feeling should go away in the next 24 hours  To feel weak, sleepy or \"wiped out\". Your should start feeling better in the next 24 hours.   To experience mild discomforts such as sore lip or tongue, headache, cramps, gas pains or a bloated feeling in your abdomen.   To experience mild back pain or soreness for a day or two if you had spinal or epidural anesthesia.   If you had laparoscopic surgery, to feel shoulder pain or discomfort on the day of surgery.   For some patients to have nausea after surgery/anesthesia    If you feel nausea or experience vomiting:   Try to move around less.   Eat less than usual or drink only liquids until the next morning   Nausea should resolve in about 24 hours    If you have a problem when you are at home:    Call your surgeons office +      Discharge Instructions: After Your Surgery  You’ve just had surgery. During surgery, you were given medicine called anesthesia to keep you relaxed and free of pain. After surgery, you may have some pain or nausea. This is common. Here are some tips for feeling better and getting well after surgery.   Going home  Your healthcare provider will show you how to take care of yourself when you go home. They'll also answer your  questions. Have an adult family member or friend drive you home. For the first 24 hours after your surgery:   Don't drive or use heavy equipment.  Don't make important decisions or sign legal papers.  Take medicines as directed.  Don't drink alcohol.  Have someone stay with you, if needed. They can watch for problems and help keep you safe.  Be sure to go to all follow-up visits with your healthcare provider. And rest after your surgery for as long as your provider tells you to.   Coping with pain  If you have pain after surgery, pain medicine will help you feel better. Take it as directed, before pain becomes severe. Also, ask your healthcare provider or pharmacist about other ways to control pain. This might be with heat, ice, or relaxation. And follow any other instructions your surgeon or nurse gives you.      Stay on schedule with your medicine.     Tips for taking pain medicine  To get the best relief possible, remember these points:   Pain medicines can upset your stomach. Taking them with a little food may help.  Most pain relievers taken by mouth need at least 20 to 30 minutes to start to work.  Don't wait till your pain becomes severe before you take your medicine. Try to time your medicine so that you can take it before starting an activity. This might be before you get dressed, go for a walk, or sit down for dinner.  Constipation is a common side effect of some pain medicines. Call your healthcare provider before taking any medicines such as laxatives or stool softeners to help ease constipation. Also ask if you should skip any foods. Drinking lots of fluids and eating foods such as fruits and vegetables that are high in fiber can also help. Remember, don't take laxatives unless your surgeon has prescribed them.  Drinking alcohol and taking pain medicine can cause dizziness and slow your breathing. It can even be deadly. Don't drink alcohol while taking pain medicine.  Pain medicine can make you react  more slowly to things. Don't drive or run machinery while taking pain medicine.  Your healthcare provider may tell you to take acetaminophen to help ease your pain. Ask them how much you're supposed to take each day. Acetaminophen or other pain relievers may interact with your prescription medicines or other over-the-counter (OTC) medicines. Some prescription medicines have acetaminophen and other ingredients in them. Using both prescription and OTC acetaminophen for pain can cause you to accidentally overdose. Read the labels on your OTC medicines with care. This will help you to clearly know the list of ingredients, how much to take, and any warnings. It may also help you not take too much acetaminophen. If you have questions or don't understand the information, ask your pharmacist or healthcare provider to explain it to you before you take the OTC medicine.   Managing nausea  Some people have an upset stomach (nausea) after surgery. This is often because of anesthesia, pain, or pain medicine, less movement of food in the stomach, or the stress of surgery. These tips will help you handle nausea and eat healthy foods as you get better. If you were on a special food plan before surgery, ask your healthcare provider if you should follow it while you get better. Check with your provider on how your eating should progress. It may depend on the surgery you had. These general tips may help:   Don't push yourself to eat. Your body will tell you when to eat and how much.  Start off with clear liquids and soup. They're easier to digest.  Next try semi-solid foods as you feel ready. These include mashed potatoes, applesauce, and gelatin.  Slowly move to solid foods. Don’t eat fatty, rich, or spicy foods at first.  Don't force yourself to have 3 large meals a day. Instead eat smaller amounts more often.  Take pain medicines with a small amount of solid food, such as crackers or toast. This helps prevent nausea.  When to call  your healthcare provider  Call your healthcare provider right away if you have any of these:   You still have too much pain, or the pain gets worse, after taking the medicine. The medicine may not be strong enough. Or there may be a complication from the surgery.  You feel too sleepy, dizzy, or groggy. The medicine may be too strong.  Side effects such as nausea or vomiting. Your healthcare provider may advise taking other medicines to .  Skin changes such as rash, itching, or hives. This may mean you have an allergic reaction. Your provider may advise taking other medicines.  The incision looks different (for instance, part of it opens up).  Bleeding or fluid leaking from the incision site, and weren't told to expect that.  Fever of 100.4°F (38°C) or higher, or as directed by your provider.  Call 911  Call 911 right away if you have:   Trouble breathing  Facial swelling    If you have obstructive sleep apnea   You were given anesthesia medicine during surgery to keep you comfortable and free of pain. After surgery, you may have more apnea spells because of this medicine and other medicines you were given. The spells may last longer than normal.    At home:  Keep using the continuous positive airway pressure (CPAP) device when you sleep. Unless your healthcare provider tells you not to, use it when you sleep, day or night. CPAP is a common device used to treat obstructive sleep apnea.  Talk with your provider before taking any pain medicine, muscle relaxants, or sedatives. Your provider will tell you about the possible dangers of taking these medicines.  Contact your provider if your sleeping changes a lot even when taking medicines as directed.  Jossie last reviewed this educational content on 10/1/2021  © 0521-8734 [employer]. All rights reserved. This information is not intended as a substitute for professional medical care. Always follow your healthcare professional's instructions.

## 2025-06-24 ENCOUNTER — HOSPITAL ENCOUNTER (OUTPATIENT)
Facility: HOSPITAL | Age: 35
Setting detail: HOSPITAL OUTPATIENT SURGERY
Discharge: HOME | End: 2025-06-24
Attending: STUDENT IN AN ORGANIZED HEALTH CARE EDUCATION/TRAINING PROGRAM | Admitting: STUDENT IN AN ORGANIZED HEALTH CARE EDUCATION/TRAINING PROGRAM
Payer: COMMERCIAL

## 2025-06-24 ENCOUNTER — APPOINTMENT (OUTPATIENT)
Dept: GENERAL RADIOLOGY | Facility: HOSPITAL | Age: 35
End: 2025-06-24
Attending: STUDENT IN AN ORGANIZED HEALTH CARE EDUCATION/TRAINING PROGRAM
Payer: COMMERCIAL

## 2025-06-24 ENCOUNTER — ANESTHESIA EVENT (OUTPATIENT)
Dept: SURGERY | Facility: HOSPITAL | Age: 35
End: 2025-06-24
Payer: COMMERCIAL

## 2025-06-24 ENCOUNTER — ANESTHESIA (OUTPATIENT)
Dept: SURGERY | Facility: HOSPITAL | Age: 35
End: 2025-06-24
Payer: COMMERCIAL

## 2025-06-24 VITALS
OXYGEN SATURATION: 93 % | HEIGHT: 62 IN | RESPIRATION RATE: 16 BRPM | BODY MASS INDEX: 40.67 KG/M2 | DIASTOLIC BLOOD PRESSURE: 63 MMHG | HEART RATE: 94 BPM | SYSTOLIC BLOOD PRESSURE: 115 MMHG | TEMPERATURE: 98 F | WEIGHT: 221 LBS

## 2025-06-24 DIAGNOSIS — M25.852 FEMOROACETABULAR IMPINGEMENT OF LEFT HIP: Primary | ICD-10-CM

## 2025-06-24 LAB — B-HCG UR QL: NEGATIVE

## 2025-06-24 PROCEDURE — 81025 URINE PREGNANCY TEST: CPT

## 2025-06-24 PROCEDURE — 76000 FLUOROSCOPY <1 HR PHYS/QHP: CPT | Performed by: STUDENT IN AN ORGANIZED HEALTH CARE EDUCATION/TRAINING PROGRAM

## 2025-06-24 DEVICE — POSTFREE PATIENT SAFETY KIT, LARGE
Type: IMPLANTABLE DEVICE
Brand: PIVOT GUARDIAN

## 2025-06-24 DEVICE — FG, CINCHLOCK SS ANCHOR WITH INSERTER
Type: IMPLANTABLE DEVICE | Status: FUNCTIONAL
Brand: CINCHLOCK

## 2025-06-24 DEVICE — 1.2MM XBRAID TT, 100% UHMWPE, VIOLET CO-BRAID
Type: IMPLANTABLE DEVICE | Site: HIP | Status: FUNCTIONAL
Brand: XBRAID

## 2025-06-24 RX ORDER — ACETAMINOPHEN 500 MG
1000 TABLET ORAL ONCE
Status: COMPLETED | OUTPATIENT
Start: 2025-06-24 | End: 2025-06-24

## 2025-06-24 RX ORDER — ONDANSETRON 2 MG/ML
4 INJECTION INTRAMUSCULAR; INTRAVENOUS EVERY 6 HOURS PRN
Status: DISCONTINUED | OUTPATIENT
Start: 2025-06-24 | End: 2025-06-24

## 2025-06-24 RX ORDER — INDOMETHACIN 75 MG/1
75 CAPSULE, EXTENDED RELEASE ORAL
Qty: 4 CAPSULE | Refills: 0 | Status: SHIPPED | OUTPATIENT
Start: 2025-06-24 | End: 2025-06-28

## 2025-06-24 RX ORDER — PROCHLORPERAZINE EDISYLATE 5 MG/ML
5 INJECTION INTRAMUSCULAR; INTRAVENOUS EVERY 8 HOURS PRN
Status: DISCONTINUED | OUTPATIENT
Start: 2025-06-24 | End: 2025-06-24

## 2025-06-24 RX ORDER — TRAMADOL HYDROCHLORIDE 50 MG/1
50 TABLET ORAL ONCE
Status: COMPLETED | OUTPATIENT
Start: 2025-06-24 | End: 2025-06-24

## 2025-06-24 RX ORDER — SENNOSIDES 8.6 MG
325 CAPSULE ORAL DAILY
Qty: 30 TABLET | Refills: 0 | Status: SHIPPED | OUTPATIENT
Start: 2025-06-24

## 2025-06-24 RX ORDER — FAMOTIDINE 10 MG/ML
20 INJECTION, SOLUTION INTRAVENOUS ONCE
Status: COMPLETED | OUTPATIENT
Start: 2025-06-24 | End: 2025-06-24

## 2025-06-24 RX ORDER — HYDROMORPHONE HYDROCHLORIDE 1 MG/ML
0.4 INJECTION, SOLUTION INTRAMUSCULAR; INTRAVENOUS; SUBCUTANEOUS EVERY 2 HOUR PRN
Status: DISCONTINUED | OUTPATIENT
Start: 2025-06-24 | End: 2025-06-24

## 2025-06-24 RX ORDER — NAPROXEN 500 MG/1
500 TABLET ORAL 2 TIMES DAILY WITH MEALS
Qty: 42 TABLET | Refills: 0 | Status: SHIPPED | OUTPATIENT
Start: 2025-06-24

## 2025-06-24 RX ORDER — LIDOCAINE HYDROCHLORIDE 40 MG/ML
SOLUTION TOPICAL AS NEEDED
Status: DISCONTINUED | OUTPATIENT
Start: 2025-06-24 | End: 2025-06-24 | Stop reason: SURG

## 2025-06-24 RX ORDER — FAMOTIDINE 20 MG/1
20 TABLET, FILM COATED ORAL ONCE
Status: COMPLETED | OUTPATIENT
Start: 2025-06-24 | End: 2025-06-24

## 2025-06-24 RX ORDER — ONDANSETRON 4 MG/1
4 TABLET, FILM COATED ORAL EVERY 8 HOURS PRN
Qty: 20 TABLET | Refills: 0 | Status: SHIPPED | OUTPATIENT
Start: 2025-06-24

## 2025-06-24 RX ORDER — LIDOCAINE HYDROCHLORIDE 10 MG/ML
INJECTION, SOLUTION EPIDURAL; INFILTRATION; INTRACAUDAL; PERINEURAL AS NEEDED
Status: DISCONTINUED | OUTPATIENT
Start: 2025-06-24 | End: 2025-06-24 | Stop reason: SURG

## 2025-06-24 RX ORDER — MORPHINE SULFATE 10 MG/ML
6 INJECTION, SOLUTION INTRAMUSCULAR; INTRAVENOUS EVERY 10 MIN PRN
Status: DISCONTINUED | OUTPATIENT
Start: 2025-06-24 | End: 2025-06-24

## 2025-06-24 RX ORDER — METOCLOPRAMIDE 10 MG/1
10 TABLET ORAL ONCE
Status: COMPLETED | OUTPATIENT
Start: 2025-06-24 | End: 2025-06-24

## 2025-06-24 RX ORDER — OXYCODONE HYDROCHLORIDE 5 MG/1
10 TABLET ORAL EVERY 4 HOURS PRN
Status: DISCONTINUED | OUTPATIENT
Start: 2025-06-24 | End: 2025-06-24

## 2025-06-24 RX ORDER — SODIUM CHLORIDE, SODIUM LACTATE, POTASSIUM CHLORIDE, CALCIUM CHLORIDE 600; 310; 30; 20 MG/100ML; MG/100ML; MG/100ML; MG/100ML
INJECTION, SOLUTION INTRAVENOUS CONTINUOUS
Status: DISCONTINUED | OUTPATIENT
Start: 2025-06-24 | End: 2025-06-24

## 2025-06-24 RX ORDER — HYDROMORPHONE HYDROCHLORIDE 1 MG/ML
INJECTION, SOLUTION INTRAMUSCULAR; INTRAVENOUS; SUBCUTANEOUS AS NEEDED
Status: DISCONTINUED | OUTPATIENT
Start: 2025-06-24 | End: 2025-06-24 | Stop reason: SURG

## 2025-06-24 RX ORDER — MORPHINE SULFATE 4 MG/ML
4 INJECTION, SOLUTION INTRAMUSCULAR; INTRAVENOUS EVERY 10 MIN PRN
Status: DISCONTINUED | OUTPATIENT
Start: 2025-06-24 | End: 2025-06-24

## 2025-06-24 RX ORDER — METOCLOPRAMIDE HYDROCHLORIDE 5 MG/ML
10 INJECTION INTRAMUSCULAR; INTRAVENOUS ONCE
Status: COMPLETED | OUTPATIENT
Start: 2025-06-24 | End: 2025-06-24

## 2025-06-24 RX ORDER — TRAMADOL HYDROCHLORIDE 50 MG/1
50 TABLET ORAL EVERY 6 HOURS PRN
Qty: 20 TABLET | Refills: 0 | Status: SHIPPED | OUTPATIENT
Start: 2025-06-24

## 2025-06-24 RX ORDER — ALBUTEROL SULFATE 0.83 MG/ML
2.5 SOLUTION RESPIRATORY (INHALATION) AS NEEDED
Status: DISCONTINUED | OUTPATIENT
Start: 2025-06-24 | End: 2025-06-24

## 2025-06-24 RX ORDER — BUPIVACAINE HYDROCHLORIDE 2.5 MG/ML
INJECTION, SOLUTION EPIDURAL; INFILTRATION; INTRACAUDAL; PERINEURAL AS NEEDED
Status: DISCONTINUED | OUTPATIENT
Start: 2025-06-24 | End: 2025-06-24 | Stop reason: HOSPADM

## 2025-06-24 RX ORDER — ROCURONIUM BROMIDE 10 MG/ML
INJECTION, SOLUTION INTRAVENOUS AS NEEDED
Status: DISCONTINUED | OUTPATIENT
Start: 2025-06-24 | End: 2025-06-24 | Stop reason: SURG

## 2025-06-24 RX ORDER — DEXAMETHASONE SODIUM PHOSPHATE 4 MG/ML
VIAL (ML) INJECTION AS NEEDED
Status: DISCONTINUED | OUTPATIENT
Start: 2025-06-24 | End: 2025-06-24 | Stop reason: SURG

## 2025-06-24 RX ORDER — HYDROMORPHONE HYDROCHLORIDE 1 MG/ML
0.4 INJECTION, SOLUTION INTRAMUSCULAR; INTRAVENOUS; SUBCUTANEOUS EVERY 5 MIN PRN
Status: DISCONTINUED | OUTPATIENT
Start: 2025-06-24 | End: 2025-06-24

## 2025-06-24 RX ORDER — OXYCODONE HYDROCHLORIDE 5 MG/1
5 TABLET ORAL EVERY 4 HOURS PRN
Status: DISCONTINUED | OUTPATIENT
Start: 2025-06-24 | End: 2025-06-24

## 2025-06-24 RX ORDER — MORPHINE SULFATE 4 MG/ML
2 INJECTION, SOLUTION INTRAMUSCULAR; INTRAVENOUS EVERY 10 MIN PRN
Status: DISCONTINUED | OUTPATIENT
Start: 2025-06-24 | End: 2025-06-24

## 2025-06-24 RX ORDER — NALOXONE HYDROCHLORIDE 0.4 MG/ML
0.08 INJECTION, SOLUTION INTRAMUSCULAR; INTRAVENOUS; SUBCUTANEOUS AS NEEDED
Status: DISCONTINUED | OUTPATIENT
Start: 2025-06-24 | End: 2025-06-24

## 2025-06-24 RX ORDER — ACETAMINOPHEN 325 MG/1
650 TABLET ORAL
Status: DISCONTINUED | OUTPATIENT
Start: 2025-06-24 | End: 2025-06-24

## 2025-06-24 RX ORDER — MIDAZOLAM HYDROCHLORIDE 1 MG/ML
INJECTION INTRAMUSCULAR; INTRAVENOUS AS NEEDED
Status: DISCONTINUED | OUTPATIENT
Start: 2025-06-24 | End: 2025-06-24 | Stop reason: SURG

## 2025-06-24 RX ORDER — HYDROMORPHONE HYDROCHLORIDE 1 MG/ML
0.6 INJECTION, SOLUTION INTRAMUSCULAR; INTRAVENOUS; SUBCUTANEOUS EVERY 5 MIN PRN
Status: DISCONTINUED | OUTPATIENT
Start: 2025-06-24 | End: 2025-06-24

## 2025-06-24 RX ORDER — SENNOSIDES 8.6 MG/1
1 TABLET ORAL DAILY
Qty: 50 TABLET | Refills: 0 | Status: SHIPPED | OUTPATIENT
Start: 2025-06-24

## 2025-06-24 RX ORDER — HYDROMORPHONE HYDROCHLORIDE 1 MG/ML
0.8 INJECTION, SOLUTION INTRAMUSCULAR; INTRAVENOUS; SUBCUTANEOUS EVERY 2 HOUR PRN
Status: DISCONTINUED | OUTPATIENT
Start: 2025-06-24 | End: 2025-06-24

## 2025-06-24 RX ORDER — HYDROMORPHONE HYDROCHLORIDE 1 MG/ML
0.2 INJECTION, SOLUTION INTRAMUSCULAR; INTRAVENOUS; SUBCUTANEOUS EVERY 5 MIN PRN
Status: DISCONTINUED | OUTPATIENT
Start: 2025-06-24 | End: 2025-06-24

## 2025-06-24 RX ORDER — OXYCODONE HYDROCHLORIDE 5 MG/1
5 TABLET ORAL EVERY 4 HOURS PRN
Qty: 25 TABLET | Refills: 0 | Status: SHIPPED | OUTPATIENT
Start: 2025-06-24

## 2025-06-24 RX ORDER — ONDANSETRON 2 MG/ML
4 INJECTION INTRAMUSCULAR; INTRAVENOUS ONCE
Status: COMPLETED | OUTPATIENT
Start: 2025-06-24 | End: 2025-06-24

## 2025-06-24 RX ADMIN — LIDOCAINE HYDROCHLORIDE 50 MG: 10 INJECTION, SOLUTION EPIDURAL; INFILTRATION; INTRACAUDAL; PERINEURAL at 07:32:00

## 2025-06-24 RX ADMIN — SODIUM CHLORIDE, SODIUM LACTATE, POTASSIUM CHLORIDE, CALCIUM CHLORIDE: 600; 310; 30; 20 INJECTION, SOLUTION INTRAVENOUS at 10:48:00

## 2025-06-24 RX ADMIN — ROCURONIUM BROMIDE 10 MG: 10 INJECTION, SOLUTION INTRAVENOUS at 08:05:00

## 2025-06-24 RX ADMIN — ROCURONIUM BROMIDE 50 MG: 10 INJECTION, SOLUTION INTRAVENOUS at 07:34:00

## 2025-06-24 RX ADMIN — MIDAZOLAM HYDROCHLORIDE 2 MG: 1 INJECTION INTRAMUSCULAR; INTRAVENOUS at 07:30:00

## 2025-06-24 RX ADMIN — ROCURONIUM BROMIDE 10 MG: 10 INJECTION, SOLUTION INTRAVENOUS at 08:50:00

## 2025-06-24 RX ADMIN — LIDOCAINE HYDROCHLORIDE 4 ML: 40 SOLUTION TOPICAL at 07:37:00

## 2025-06-24 RX ADMIN — HYDROMORPHONE HYDROCHLORIDE 0.5 MG: 1 INJECTION, SOLUTION INTRAMUSCULAR; INTRAVENOUS; SUBCUTANEOUS at 10:31:00

## 2025-06-24 RX ADMIN — DEXAMETHASONE SODIUM PHOSPHATE 8 MG: 4 MG/ML VIAL (ML) INJECTION at 08:05:00

## 2025-06-24 NOTE — ANESTHESIA POSTPROCEDURE EVALUATION
Patient: Angela Chavez    Procedure Summary       Date: 06/24/25 Room / Location: [facility] MAIN OR 09 / [facility] OR; [facility] X-ray    Anesthesia Start: 0729 Anesthesia Stop: 1054    Procedures:       Left hip arthroscopic labral repair and femoral osteochondroplasty (Left: Hip)      XR FLUOROSCOPY C-ARM TIME LESS THAN 1 HOUR (CPT=76000) Diagnosis:       Femoroacetabular impingement      (Femoroacetabular impingement [M25.859])      (Left hip arthroscopic labral repair and femoral osteochondroplasty)    Scheduled Providers: Meliton Castaneda MD Anesthesiologist: David Mcbride MD    Anesthesia Type: general ASA Status: 2            Anesthesia Type: general    Vitals Value Taken Time   /75 06/24/25 10:52   Temp 97.4 06/24/25 10:54   Pulse 98 06/24/25 10:53   Resp 17 06/24/25 10:53   SpO2 97 % 06/24/25 10:53   Vitals shown include unfiled device data.    [facility] AN Post Evaluation:   Patient Evaluated in PACU  Patient Participation: complete - patient participated  Level of Consciousness: awake  Pain Score: 0  Pain Management: adequate  Airway Patency:patent  Yes    Nausea/Vomiting: none  Cardiovascular Status: acceptable  Respiratory Status: acceptable      David Mcbride MD  6/24/2025 10:54 AM

## 2025-06-24 NOTE — ANESTHESIA PROCEDURE NOTES
Airway  Date/Time: 6/24/2025 7:37 AM  Reason: elective    Airway not difficult    General Information and Staff   Patient location during procedure: OR  Anesthesiologist: David Mcbride MD  Performed: anesthesiologist   Performed by: David Mcbride MD  Authorized by: David Mcbride MD        Indications and Patient Condition  Indications for airway management: anesthesia     Preoxygenated: yesPatient position: sniffing    Mask difficulty assessment: 1 - vent by mask    Final Airway Details    Final airway type: endotracheal airway    Successful airway: ETT  Cuffed: yes   Successful intubation technique: direct laryngoscopy  Facilitating devices/methods: intubating stylet  Endotracheal tube insertion site: oral  Blade: Iron  Blade size: #3  ETT size (mm): 7.5    Cormack-Lehane Classification: grade IIA - partial view of glottis  Measured from: lips  ETT to lips (cm): 21  Number of attempts at approach: 1

## 2025-06-24 NOTE — BRIEF OP NOTE
Pre-Operative Diagnosis: Femoroacetabular impingement [M25.859]     Post-Operative Diagnosis: * No post-op diagnosis entered *      Procedure Performed:   Left hip arthroscopic labral repair and femoral osteochondroplasty    Surgeons and Role:     * Meliton Castaneda MD - Primary    Assistant(s):  PA: Kole Hastings PA-C     Surgical Findings: Left Hip Torn Labrum     Specimen: None     Estimated Blood Loss: No data recorded    Dictation Number:  Pending    Meliton Castaneda MD  6/24/2025  10:34 AM

## 2025-06-24 NOTE — H&P
[facility]  part of [facility]    History & Physical    Angela Chavez Patient Status:  Hospital Outpatient Surgery    1990 MRN [id number]   Location [facility] PRE OP RECOVERY Attending Meliton Castaneda MD   Hosp Day # 0 PCP Armando Arenas MD     Date:  2025  Date of Admission:  2025    History provided by:patient  Chief Complaint:   No chief complaint on file.      HPI:   Angela Chavez is a(n) 35 year old female. Here for Left Hip Scope    History   Past Medical History[1]  Past Surgical History[2]  Family History[3]  Social History:  Short Social Hx on File[4]  Allergies/Medications:   Allergies: Allergies[5]  Prescriptions Prior to Admission[6]    Review of Systems:   Pertinent items are noted in HPI.    Physical Exam:   Vital Signs:  Blood pressure 145/74, pulse 93, temperature 98.3 °F (36.8 °C), temperature source Oral, resp. rate 16, height 5' 2\" (1.575 m), weight 221 lb (100.2 kg), SpO2 98%, not currently breastfeeding.     General appearance: alert, appears stated age and cooperative  Extremities: +Fadir  Pulses: 2+ and symmetric  Neurologic: Alert and oriented X 3, normal strength and tone. Normal symmetric reflexes. Normal coordination and gait      Results:     Lab Results   Component Value Date    WBC 7.3 2025    HGB 12.6 2025    HCT 37.9 2025    .0 2025    CREATSERUM 0.84 2025    BUN 10 2025     2025    K 3.9 2025     2025    CO2 25.0 2025    GLU 97 2025    CA 9.0 2025    ALB 4.5 2025    ALKPHO 95 2025    BILT 0.6 2025    TP 7.3 2025    AST 16 2025    ALT 21 2025    T4F 1.3 2019    TSH 0.924 2025    MG 2.0 2025       No results found.        Assessment/Plan:     * No active hospital problems. *     Proceed with surgery    Meliton Castaneda MD  2025        [1]   Past Medical History:   Allergic  rhinitis    Anxiety state, unspecified    Asthma (HCC)    Esophageal reflux    Fibroids    Human papilloma virus infection    Migraines    Pap smear for cervical cancer screening    wnl   [2]   Past Surgical History:  Procedure Laterality Date    Colposcopy, cervix w/upper adjacent vagina; w/biopsy(s), cervix  01/01/2013    Other surgical history  01/01/2007    LEFT BREAST CYST REMOVED    Other surgical history  01/01/2013    Lasik    Other surgical history Left     ankle   [3]   Family History  Problem Relation Age of Onset    Heart Disease Maternal Grandfather     Diabetes Maternal Grandfather     Hypertension Father     Obesity Father     No Known Problems Mother     Diabetes Maternal Grandmother     Heart Disorder Maternal Grandmother     Hypertension Maternal Grandmother     Cancer Maternal Grandmother 65        colon cancer    Diabetes Paternal Grandmother     Diabetes Paternal Grandfather     Stroke Neg    [4]   Social History  Socioeconomic History    Marital status: Single   Tobacco Use    Smoking status: Never    Smokeless tobacco: Never   Vaping Use    Vaping status: Never Used   Substance and Sexual Activity    Alcohol use: Not Currently     Comment: ONCE EVERY 2-3 MONTHS    Drug use: No    Sexual activity: Yes     Partners: Male     Birth control/protection: Implant   Other Topics Concern    Caffeine Concern Yes     Comment: soda    Exercise No    Seat Belt Yes    History of tanning Yes    Reaction to local anesthetic No    Pt has a pacemaker No    Pt has a defibrillator No   [5] No Known Allergies  [6]   Medications Prior to Admission   Medication Sig    Meloxicam 15 MG Oral Tab Take 1 tablet (15 mg total) by mouth daily.    albuterol 108 (90 Base) MCG/ACT Inhalation Aero Soln Inhale 1 puff and hold breath for 10 seconds then exhale.  Wait 1 full minute and repeat for second puff.  Use every 4-6 hours as needed.    albuterol (PROAIR HFA) 108 (90 Base) MCG/ACT Inhalation Aero Soln Inhale 2 puffs into  the lungs every 8 (eight) hours as needed for Wheezing.    montelukast 10 MG Oral Tab Take 1 tablet (10 mg total) by mouth nightly.    levocetirizine 5 MG Oral Tab Take 1 tablet (5 mg total) by mouth every evening.    Meloxicam 15 MG Oral Tab Take 1 tablet (15 mg total) by mouth daily.    ondansetron 4 MG Oral Tablet Dispersible Take 1 tablet (4 mg total) by mouth every 8 (eight) hours as needed for Nausea.    meclizine 25 MG Oral Tab Take 1 tablet (25 mg total) by mouth as needed.

## 2025-06-24 NOTE — ANESTHESIA PREPROCEDURE EVALUATION
Anesthesia PreOp Note    HPI:     Angela Chavez is a 35 year old female who presents for preoperative consultation requested by: Meliton Castaneda MD    Date of Surgery: 6/24/2025    Procedure(s):  Left hip arthroscopic labral repair and femoral osteochondroplasty  Indication: Femoroacetabular impingement [M25.859]    Relevant Problems   No relevant active problems       NPO:  Last Liquid Consumption Date: 06/23/25  Last Liquid Consumption Time: 2200  Last Solid Consumption Date: 06/23/25  Last Solid Consumption Time: 2100  Last Liquid Consumption Date: 06/23/25          History Review:  Patient Active Problem List    Diagnosis Date Noted    Allergic rhinitis 03/12/2024    Migraine without aura and without status migrainosus, not intractable 10/30/2015       Past Medical History[1]    Past Surgical History[2]    Prescriptions Prior to Admission[3]  Current Medications and Prescriptions Ordered in Epic[4]    Allergies[5]    Family History[6]  Social Hx on file[7]    Available pre-op labs reviewed.  Lab Results   Component Value Date    WBC 7.3 04/01/2025    RBC 4.40 04/01/2025    HGB 12.6 04/01/2025    HCT 37.9 04/01/2025    MCV 86.1 04/01/2025    MCH 28.6 04/01/2025    MCHC 33.2 04/01/2025    RDW 14.2 04/01/2025    .0 04/01/2025    URINEPREG Negative 06/24/2025     Lab Results   Component Value Date     04/01/2025    K 3.9 04/01/2025     04/01/2025    CO2 25.0 04/01/2025    BUN 10 04/01/2025    CREATSERUM 0.84 04/01/2025    GLU 97 04/01/2025    CA 9.0 04/01/2025          Vital Signs:  Body mass index is 40.42 kg/m².   height is 1.575 m (5' 2\") and weight is 100.2 kg (221 lb). Her oral temperature is 98.3 °F (36.8 °C). Her blood pressure is 145/74 and her pulse is 93. Her respiration is 16 and oxygen saturation is 98%.   Vitals:    06/24/25 0607   BP: 145/74   Pulse: 93   Resp: 16   Temp: 98.3 °F (36.8 °C)   TempSrc: Oral   SpO2: 98%   Weight: 100.2 kg (221 lb)   Height: 1.575 m (5' 2\")         Anesthesia Evaluation      Airway   Mallampati: II  TM distance: >3 FB  Neck ROM: full  Dental      Pulmonary - normal exam   (+) asthma  Cardiovascular - normal exam    Neuro/Psych    (+)  anxiety/panic attacks,        GI/Hepatic/Renal    (+) GERD    Endo/Other    Abdominal   (+) obese                 Anesthesia Plan:   ASA:  2  Plan:   General  Monitors and Lines:   BIS  Airway:  ETT      I have informed Angela Chavez and/or legal guardian or family member of the nature of the anesthetic plan, benefits, risks including possible dental damage if relevant, major complications, and any alternative forms of anesthetic management.   All of the patient's questions were answered to the best of my ability. The patient desires the anesthetic management as planned.  David Mcbride MD  6/24/2025 6:49 AM  Present on Admission:  **None**           [1]   Past Medical History:   Allergic rhinitis    Anxiety state, unspecified    Asthma (HCC)    Esophageal reflux    Fibroids    Human papilloma virus infection    Migraines    Pap smear for cervical cancer screening    wnl   [2]   Past Surgical History:  Procedure Laterality Date    Colposcopy, cervix w/upper adjacent vagina; w/biopsy(s), cervix  01/01/2013    Other surgical history  01/01/2007    LEFT BREAST CYST REMOVED    Other surgical history  01/01/2013    Lasik    Other surgical history Left     ankle   [3]   Facility-Administered Medications Prior to Admission   Medication Dose Route Frequency Provider Last Rate Last Admin    triamcinolone acetonide (Kenalog-40) 40 MG/ML injection 120 mg  120 mg Intramuscular Once Shah, Yogen Y, DO         Medications Prior to Admission   Medication Sig Dispense Refill Last Dose/Taking    Meloxicam 15 MG Oral Tab Take 1 tablet (15 mg total) by mouth daily. 30 tablet 0 6/10/2025    albuterol 108 (90 Base) MCG/ACT Inhalation Aero Soln Inhale 1 puff and hold breath for 10 seconds then exhale.  Wait 1 full minute and repeat for second  puff.  Use every 4-6 hours as needed. 1 each 0 6/10/2025    albuterol (PROAIR HFA) 108 (90 Base) MCG/ACT Inhalation Aero Soln Inhale 2 puffs into the lungs every 8 (eight) hours as needed for Wheezing. 1 each 3 6/10/2025    montelukast 10 MG Oral Tab Take 1 tablet (10 mg total) by mouth nightly. 90 tablet 3 6/23/2025 at  8:00 PM    levocetirizine 5 MG Oral Tab Take 1 tablet (5 mg total) by mouth every evening. 90 tablet 1 6/23/2025 at  8:00 PM    Meloxicam 15 MG Oral Tab Take 1 tablet (15 mg total) by mouth daily. 14 tablet 0 6/10/2025    ondansetron 4 MG Oral Tablet Dispersible Take 1 tablet (4 mg total) by mouth every 8 (eight) hours as needed for Nausea. 30 tablet 2 More than a month    meclizine 25 MG Oral Tab Take 1 tablet (25 mg total) by mouth as needed.   More than a month   [4]   Current Facility-Administered Medications Ordered in Epic   Medication Dose Route Frequency Provider Last Rate Last Admin    lactated ringers infusion   Intravenous Continuous Meliton Castaneda MD 20 mL/hr at 06/24/25 0620 New Bag at 06/24/25 0620    ceFAZolin (Ancef) 2g in 10mL IV syringe premix  2 g Intravenous Once Meliton Castaneda MD         No current [facility]-ordered outpatient medications on file.   [5] No Known Allergies  [6]   Family History  Problem Relation Age of Onset    Heart Disease Maternal Grandfather     Diabetes Maternal Grandfather     Hypertension Father     Obesity Father     No Known Problems Mother     Diabetes Maternal Grandmother     Heart Disorder Maternal Grandmother     Hypertension Maternal Grandmother     Cancer Maternal Grandmother 65        colon cancer    Diabetes Paternal Grandmother     Diabetes Paternal Grandfather     Stroke Neg    [7]   Social History  Socioeconomic History    Marital status: Single   Tobacco Use    Smoking status: Never    Smokeless tobacco: Never   Vaping Use    Vaping status: Never Used   Substance and Sexual Activity    Alcohol use: Not Currently     Comment: ONCE  EVERY 2-3 MONTHS    Drug use: No    Sexual activity: Yes     Partners: Male     Birth control/protection: Implant   Other Topics Concern    Caffeine Concern Yes     Comment: soda    Exercise No    Seat Belt Yes    History of tanning Yes    Reaction to local anesthetic No    Pt has a pacemaker No    Pt has a defibrillator No

## 2025-06-25 ENCOUNTER — TELEPHONE (OUTPATIENT)
Dept: PHYSICAL THERAPY | Facility: HOSPITAL | Age: 35
End: 2025-06-25

## 2025-06-25 NOTE — OPERATIVE REPORT
Operative Note   Attending Surgeon:   Meliton Castaneda MD        Surgical Assistants:    Surgical Assistant.: Kole Hastings PA-C     Date of Surgery:   6.24.2025     Preoperative Diagnosis:   LEFT hip labral tear  LEFT hip femoroacetabular impingement        Postoperative Diagnosis:   As above     Procedure:   Left hip arthroscopy  Left hip proximal Femoroplasty (CPT 95841)  Left hip acetabuloplasty with subspine decompression and rim trimming   Left hip acetabular labral repair (CPT 43771)  Left hip capsular closure     Anesthesia:    General      Indications for Surgery:   Patient presented with persistent Right  hip pain, evidence of underlying GABRIELE. The failed conservative management including NSAIDs, PT, and activity modifications. They ultimately failed conservative measures. Risks, benefits, alternatives, complications of operative treatment were discussed with the patient and family prior to procedure and wished to proceed. Op site was marked preop holding area. Patient received antibiotics within 1 hour of incision.  Description of Procedure:   Patient brought to the operating room, placed supine on the operative table. Bony prominences and peripheral nerves adequately padded. General anesthesia was then induced. Repositioned on the post-less traction table with well-padded boots. We confirmed adequate distraction of the hip. We then prepped the left hip in standard sterile fashion. Time-out was called to confirmed patient, op site, and procedure. We began a standard diagnostic arthroscopy venting the hip seal through an anterolateral portal approach after adequate distraction. We then reinserted the needle and established a portal in standard Seldinger technique. Scope was advanced into the hip joint without difficulty. Mid anterior portal localized under direct visualization. We utilized a distal accessory anterolateral portal later in the procedure. Inner portal capsulotomy was performed. Hemostasis  was obtained.  Diagnostic arthroscopy demonstrated:  Labrum:  Detachment from 9o'clock to 2 o'clock with significant degenerative changes   Acetabular cartilage: Debonding and grade 2 changes   AllS and acetabular rim: intact   Ligamentum teres: intact   Femoral head cartilage: grade 1 to 2 changes      We exposed the capsule labral recess to free up the labrum. We gently decorticated the rim with a bur as well as removing a subspinal prominence. At this point, the labrum was allowed to be seated more appropriately on the rim. We then placed 4 Mi Pivot knotted anchors. Care taken to avoid articular cartilage penetration with drilling and placement. These were all sequentially tensioned. We were happy with the position of labrum. By traction down, the final probing of the articular cartilage was stable. We were happy with the restoration of the seal.  Moderate cam deformity anteriorly, anterolaterally and laterally was then exposed. Two capsular retraction sutures were placed. Performed the osteoplasty with the bur utilizing fluoroscopic confirmation, dynamic exam as well as direct visualization. We extended this up above the vessels. We were happy with the overall correction of the deformity on the fluoroscopic views. We then copiously irrigated with normal saline. We closed the interportal capsulotomy with 6 #2 Fiberwire sutures. The medial suture of the interportal capsulotomy were closed in extension. Hip was positioned in a neutral position. The capsule remained adequately closed. We then closed with 2-0 Monocryl and 3-0 nylon sutures in the skin, 20 mL 0.5% Marcaine injected in wound for perioperative anesthesia. Patient awakened and transferred to recovery room in appropriate condition.   ESTIMATED BLOOD LOSS Minimal.   IV FLUIDS See anesthesia record.   SPONGE AND NEEDLE COUNTS Correct x2.   CONDITION ON TRANSFER PACU, stable.

## 2025-06-27 ENCOUNTER — OFFICE VISIT (OUTPATIENT)
Dept: PHYSICAL THERAPY | Age: 35
End: 2025-06-27
Attending: STUDENT IN AN ORGANIZED HEALTH CARE EDUCATION/TRAINING PROGRAM
Payer: COMMERCIAL

## 2025-06-27 DIAGNOSIS — M25.852 FEMOROACETABULAR IMPINGEMENT OF LEFT HIP: Primary | ICD-10-CM

## 2025-06-27 PROCEDURE — 97110 THERAPEUTIC EXERCISES: CPT | Performed by: PHYSICAL THERAPIST

## 2025-06-27 PROCEDURE — 97161 PT EVAL LOW COMPLEX 20 MIN: CPT | Performed by: PHYSICAL THERAPIST

## 2025-06-27 NOTE — PROGRESS NOTES
LOWER EXTREMITY EVALUATION:     Diagnosis:   Femoroacetabular impingement of left hip (M25.852) Patient:  Angela Chavez (35 year old, female)        Referring Provider: Meliton Castaneda  Today's Date   6/27/2025    Precautions:  None (ligament reconstruction R 2020- feels fully recoverd)   Date of Evaluation: 06/27/25  Next MD visit: 7/3/2025  Date of Surgery: 6/24/2025     PATIENT SUMMARY     Summary of chief complaints: minimal pain, NWB for 2 weeks  History of current condition: 3 years ago tore labrum - leg gave out walking.  Then no mobility in the leg.  Had 2 injections, lay off working out.  Tried to do some bike riding but unable to due to pain.   Pain level: current 0 /10, at best 0 /10, at worst 3 /10  Description of symptoms:  mild ache   Occupation: x-ray technician, alot of squatting   Leisure activities/Hobbies: Work out - weight machines, bike or treadmill - walking   Prior level of function: Prior to 3 years ago able to work out and be more active and squatting  Current limitations: NWB for 2 weeks, off work until July 28th, limited activities  Pt goals: 1. To get back to normal work activities, return to work outs.  Past medical history was reviewed with Angela.  Significant findings include:  ankle surgery tendon work   Imaging/Tests:     Angela  has a past medical history of Allergic rhinitis, Anxiety state, unspecified, Asthma (HCC), Esophageal reflux, Fibroids, Human papilloma virus infection, Migraines, and Pap smear for cervical cancer screening (06/01/2013).  She  has a past surgical history that includes other surgical history (01/01/2007); other surgical history (01/01/2013); colposcopy, cervix w/upper adjacent vagina; w/biopsy(s), cervix (01/01/2013); and other surgical history (Left).    ASSESSMENT  Angela presents to physical therapy evaluation with primary c/o minimal pain, NWB for 2 weeks. The results of the objective tests and measures show impaired ROM L to R LE,  impaired strength L to R LE, non weight bearing for 2 weeks with walker, and c/o minimal pain.. Functional deficits include but are not limited to NWB for 2 weeks, off work until July 28th, limited activities. Signs and symptoms are consistent with diagnosis of Femoroacetabular impingement of left hip (M25.852). Pt and PT discussed evaluation findings, pathology, POC and HEP.  Pt voiced understanding and performs HEP correctly without reported pain. Skilled Physical Therapy is medically necessary to address the above impairments and reach functional goals.    OBJECTIVE:      Musculoskeletal    Palpation: bandage in place over incision, no noted drainage.       ROM and Strength: (* denotes performed with pain)  Hip   ROM MMT (-/5)    R L R L     Flex (L2) all WNL no active movements yet for L LE 5/5 NA on L     Ext      5/5       Abd     5/5       ER     5/5       IR     5/5      ,   Knee   ROM MMT (-/5)    R L R L     Flex WNL WNL 5/5 NT     Ext (L3) WNL WNL 5/5 NT       Flexibility:    LE Flexibility R L     Hip Flexor WNL not tested     Hamstrings min restricted not tested     ITB not tested not tested     Piriformis not tested not tested     Quads  NT  NT     Gastroc-soleus not tested not tested       Neurological:  Sensation: intact     Balance and Functional Mobility:  Gait: pt ambulates on level ground with other (use comment) (NWB with use of walker for 2 weeks)     SLS: NT  Functional Mobility:  NT     Today's Treatment and Response:   Pt education was provided on exam findings, treatment diagnosis, treatment plan, expectations, and prognosis.    Today's Treatment       6/27/2025   LE Treatment   Therapeutic Exercise Minutes 15   Evaluation Minutes 30   Total Time Of Timed Procedures 15   Total Time Of Service-Based Procedures 30   Total Treatment Time 45   HEP Quad sets x 20, hold 5 sec, 2 x daily   Glut sets  Abominal sets - same as quads.          Patient was instructed in and issued a HEP for: Quad sets  x 20, hold 5 sec, 2 x daily   Glut sets  Abominal sets - same as quads.    Attempted upright bike, unable to do  Attempted prone lying - patient not comfortable doing for home.      Charges:  PT EVAL: Low Complexity,    In agreement with evaluation findings and clinical rationale, this evaluation involved LOW COMPLEXITY decision making due to no personal factors/comorbidities, 1-2 body structures involved/activity limitations, and stable symptoms as documented in the evaluation.                                                                                                                PLAN OF CARE:      Goals: (to be met in 15 visits)   Patient to have full ROM at L to R hip to return to normal daily activities  Patient to have equal strength L to R hip to return to normal daily activities without pain or limitation  Patient independent with ongoing HEP  Patient to be able to progress per protocol.      Frequency / Duration: Patient will be seen 2x/week or a total of 15  visits over a 90 day period. Treatment will include: Manual Therapy; Neuromuscular Re-education; Home Exercise Program instruction; Therapeutic Exercise    Education or treatment limitation: None   Rehab Potential: excellent     LEFS Score  LEFS Score: (Patient-Rptd) 53.75 % (6/25/2025  7:22 PM)      Patient was advised of these findings, precautions, and treatment options and has agreed to actively participate in planning and for this course of care.    Thank you for your referral. Please co-sign or sign and return this letter via fax as soon as possible to [phone]. If you have any questions, please contact me at Dept: [phone]    Sincerely,  Electronically signed by therapist: Raquel Jones, PT  Physician's certification required: Yes  I certify the need for these services furnished under this plan of treatment and while under my care.    X___________________________________________________ Date____________________    Certification  From: 6/27/2025  To: 9/25/2025

## 2025-07-01 ENCOUNTER — OFFICE VISIT (OUTPATIENT)
Dept: PHYSICAL THERAPY | Age: 35
End: 2025-07-01
Attending: STUDENT IN AN ORGANIZED HEALTH CARE EDUCATION/TRAINING PROGRAM
Payer: COMMERCIAL

## 2025-07-01 PROCEDURE — 97110 THERAPEUTIC EXERCISES: CPT | Performed by: PHYSICAL THERAPIST

## 2025-07-01 NOTE — PROGRESS NOTES
Patient: Angela Chavez (35 year old, female) Referring Provider:  Insurance:   Diagnosis: Femoroacetabular impingement of left hip (M25.852) Meliton MUNIZ   Date of Surgery: 6/24/2025 Next MD visit:  N/A   Precautions:  None (ligament reconstruction R 2020- feels fully recoverd) 7/3/2025 Referral Information:    Date of Evaluation: Req: 0, Auth: 0, Exp:     06/27/25 POC Auth Visits:          Today's Date   7/1/2025    Subjective   Patient reports has been feeling much better last 2 days.       Pain:  0/10     Objective     See outpatient daily record          Assessment   Patient able to ride upright bike this session for 5 1/2 minutes.  Added further exercises for home based on protocol.  Patient without complaints.     Goals (to be met in 15 visits)   Patient to have full ROM at L to R hip to return to normal daily activities  Patient to have equal strength L to R hip to return to normal daily activities without pain or limitation  Patient independent with ongoing HEP  Patient to be able to progress per protocol.        Plan   Continue per protocol.  Advance per protocol given    Treatment Last 4 Visits  Treatment Day: 2        6/27/2025 7/1/2025   LE Treatment   Therapeutic Exercise  Quad sets x 20, hold 5 sec, 2 x daily   Glut sets x 15 hold 5 sec  Abominal sets - x 20 hold 5 sec  Log rolls x 15  Abduction small range x 10  Bent knee fall outs x 10  Assisted meghna stretch off table  Side clam 2 x 10  Reverse clam 2 x 10  Quadriped - rocks, pelvic tilt, arm raises  Upright bike air dyne 5 1/2 minutes.    Therapeutic Exercise Minutes 15 40   Evaluation Minutes 30    Total Time Of Timed Procedures 15 40   Total Time Of Service-Based Procedures 30 0   Total Treatment Time 45 40   HEP Quad sets x 20, hold 5 sec, 2 x daily   Glut sets  Abominal sets - same as quads.           HEP  Quadriped rocks, pelvic tilts, arm flexion  Prone lying  Side clam, reverse clam    Charges   Ex 3

## 2025-07-02 ENCOUNTER — OFFICE VISIT (OUTPATIENT)
Facility: CLINIC | Age: 35
End: 2025-07-02
Payer: COMMERCIAL

## 2025-07-02 DIAGNOSIS — M25.852 FEMOROACETABULAR IMPINGEMENT OF LEFT HIP: Primary | ICD-10-CM

## 2025-07-02 PROCEDURE — 99024 POSTOP FOLLOW-UP VISIT: CPT | Performed by: STUDENT IN AN ORGANIZED HEALTH CARE EDUCATION/TRAINING PROGRAM

## 2025-07-03 ENCOUNTER — OFFICE VISIT (OUTPATIENT)
Dept: PHYSICAL THERAPY | Age: 35
End: 2025-07-03
Attending: STUDENT IN AN ORGANIZED HEALTH CARE EDUCATION/TRAINING PROGRAM
Payer: COMMERCIAL

## 2025-07-03 PROCEDURE — 97110 THERAPEUTIC EXERCISES: CPT | Performed by: PHYSICAL THERAPIST

## 2025-07-03 NOTE — PROGRESS NOTES
Patient: Angela Chavez (35 year old, female) Referring Provider:  Insurance:   Diagnosis: Femoroacetabular impingement of left hip (M25.852) Meliton MUNIZ   Date of Surgery: 6/24/2025 Next MD visit:  N/A   Precautions:  None (ligament reconstruction R 2020- feels fully recoverd) 7/3/2025 Referral Information:    Date of Evaluation: Req: 0, Auth: 0, Exp:     06/27/25 POC Auth Visits:  15       Today's Date   7/3/2025    Subjective   Patient reports did see MD and doing well.  States not having any pain, just some tightness today       Pain: 0/100/10     Objective     See outpatient daily record        Assessment   Added prone HS curls and bridges for home program.  Patient will continue previous exercises.  Able to ride upright bike for 6 minutes today    Goals (to be met in 15 visits)   Patient to have full ROM at L to R hip to return to normal daily activities  Patient to have equal strength L to R hip to return to normal daily activities without pain or limitation  Patient independent with ongoing HEP  Patient to be able to progress per protocol.          Plan     Continue per protocol  Treatment Last 4 Visits  Treatment Day: 3       6/27/2025 7/1/2025 7/3/2025   LE Treatment   Therapeutic Exercise  Quad sets x 20, hold 5 sec, 2 x daily   Glut sets x 15 hold 5 sec  Abominal sets - x 20 hold 5 sec  Log rolls x 15  Abduction small range x 10  Bent knee fall outs x 10  Assisted meghna stretch off table  Side clam 2 x 10  Reverse clam 2 x 10  Quadriped - rocks, pelvic tilt, arm raises  Upright bike air dyne 5 1/2 minutes.  Quad sets x 20, hold 5 sec, 2 x daily   Glut sets x 20hold 5 sec  Abominal sets - x 20 hold 5 sec  Log rolls x 15  Abduction small range x 10    Assisted meghna stretch off table  Side clam 2 x 10  Reverse clam 2 x 10  Quadriped - rocks, pelvic tilt, arm raises  Prone - massage roller - glut/HS ITB region  HS curls x 15  Bridges 2 x 10  Prone ER/IR PROM within protocol  parameters.  Upright bike air dyne 6  minutes.  Seat at 2   Therapeutic Exercise Minutes 15 40 40   Evaluation Minutes 30     Total Time Of Timed Procedures 15 40 40   Total Time Of Service-Based Procedures 30 0 0   Total Treatment Time 45 40 40   HEP Quad sets x 20, hold 5 sec, 2 x daily   Glut sets  Abominal sets - same as quads.            HEP  Added bridges, HS curls prone    Charges   Ex 3

## 2025-07-07 ENCOUNTER — OFFICE VISIT (OUTPATIENT)
Dept: PHYSICAL THERAPY | Age: 35
End: 2025-07-07
Attending: STUDENT IN AN ORGANIZED HEALTH CARE EDUCATION/TRAINING PROGRAM
Payer: COMMERCIAL

## 2025-07-07 ENCOUNTER — PATIENT MESSAGE (OUTPATIENT)
Age: 35
End: 2025-07-07

## 2025-07-07 PROCEDURE — 97110 THERAPEUTIC EXERCISES: CPT | Performed by: PHYSICAL THERAPIST

## 2025-07-07 NOTE — TELEPHONE ENCOUNTER
Spoke with patient and scheduled her for 7/10/25 at 11 am with PA for suture removal. Location given. She is familiar with that location

## 2025-07-07 NOTE — PROGRESS NOTES
Patient: Angela Chavez (35 year old, female) Referring Provider:  Insurance:   Diagnosis: Femoroacetabular impingement of left hip (M25.852) Meliton MUNIZ   Date of Surgery: 6/24/2025 Next MD visit:  N/A   Precautions:  None (ligament reconstruction R 2020- feels fully recoverd) 7/3/2025 Referral Information:    Date of Evaluation: Req: 0, Auth: 0, Exp:     06/27/25 POC Auth Visits:  15       Today's Date   7/7/2025    Subjective   Patient reports overall leg good.  Feels more flexible with exercises.  States last night upper leg got hot feeling, some light ache.  States feels tight upper part of thigh.       Pain: 0/10     Objective     See outpatient daily record          Assessment   Started STM to upper leg/thigh , increased to 7 minutes on bike.  Continued per protocol    Goals (to be met in 15 visits)   Patient to have full ROM at L to R hip to return to normal daily activities  Patient to have equal strength L to R hip to return to normal daily activities without pain or limitation  Patient independent with ongoing HEP  Patient to be able to progress per protocol.            Plan     Will advance to WBAT next session as start of 3 weeks.  Treatment Last 4 Visits  Treatment Day: 4       6/27/2025 7/1/2025 7/3/2025   LE Treatment   Therapeutic Exercise  Quad sets x 20, hold 5 sec, 2 x daily   Glut sets x 15 hold 5 sec  Abominal sets - x 20 hold 5 sec  Log rolls x 15  Abduction small range x 10  Bent knee fall outs x 10  Assisted meghna stretch off table  Side clam 2 x 10  Reverse clam 2 x 10  Quadriped - rocks, pelvic tilt, arm raises  Upright bike air dyne 5 1/2 minutes.  Quad sets x 20, hold 5 sec, 2 x daily   Glut sets x 20hold 5 sec  Abominal sets - x 20 hold 5 sec  Log rolls x 15  Abduction small range x 10    Assisted meghna stretch off table  Side clam 2 x 10  Reverse clam 2 x 10  Quadriped - rocks, pelvic tilt, arm raises  Prone - massage roller - glut/HS ITB region  HS curls x  15  Bridges 2 x 10  Prone ER/IR PROM within protocol parameters.  Upright bike air dyne 6  minutes.  Seat at 2   Therapeutic Exercise Minutes 15 40 40   Evaluation Minutes 30     Total Time Of Timed Procedures 15 40 40   Total Time Of Service-Based Procedures 30 0 0   Total Treatment Time 45 40 40   HEP Quad sets x 20, hold 5 sec, 2 x daily   Glut sets  Abominal sets - same as quads.            HEP  Quad sets x 20, hold 5 sec, 2 x daily   Glut sets  Abominal sets - same as quads.    4 point rocks  Arm flexion in 4 point  HS curls  Glut med   Reverse hip  Bridges  Air dyne    Charges   Ex 3

## 2025-07-10 ENCOUNTER — OFFICE VISIT (OUTPATIENT)
Dept: PHYSICAL THERAPY | Age: 35
End: 2025-07-10
Attending: STUDENT IN AN ORGANIZED HEALTH CARE EDUCATION/TRAINING PROGRAM
Payer: COMMERCIAL

## 2025-07-10 ENCOUNTER — OFFICE VISIT (OUTPATIENT)
Age: 35
End: 2025-07-10
Payer: COMMERCIAL

## 2025-07-10 DIAGNOSIS — M25.852 FEMOROACETABULAR IMPINGEMENT OF LEFT HIP: Primary | ICD-10-CM

## 2025-07-10 PROCEDURE — 99024 POSTOP FOLLOW-UP VISIT: CPT | Performed by: PHYSICIAN ASSISTANT

## 2025-07-10 PROCEDURE — 97110 THERAPEUTIC EXERCISES: CPT | Performed by: PHYSICAL THERAPIST

## 2025-07-10 NOTE — PROGRESS NOTES
[facility]  [address]  [address]  [phone]       NURSING INTAKE COMMENTS:   Chief Complaint   Patient presents with    Post-Op     S/p L hip arthroscopy f/u - had sx on 6/24/25 - states she feels great - started walking yesterday - she felt some pain and the popping feeling once in the morning - needs her suture removed        HPI: This 35 year old female presents today with complaints of left hip pain status post labral repair left hip.  She is doing very well in physical therapy, and has recently began weightbearing on the left side.  She is here today primarily for suture removal.    Past Medical History[1]  Past Surgical History[2]  Current Medications[3]  Allergies[4]  Family History[5]    Social History     Occupational History    Not on file   Tobacco Use    Smoking status: Never    Smokeless tobacco: Never   Vaping Use    Vaping status: Never Used   Substance and Sexual Activity    Alcohol use: Not Currently     Comment: ONCE EVERY 2-3 MONTHS    Drug use: No    Sexual activity: Yes     Partners: Male     Birth control/protection: Implant        Review of Systems:  GENERAL: denies fevers, chills, night sweats, fatigue, unintentional weight loss/gain  SKIN: denies skin lesions, open sores, rash  HEENT:denies recent vision change, new nasal congestion,hearing loss, tinnitus, sore throat, headaches  RESPIRATORY: denies new shortness of breath, cough, asthma, wheezing  CARDIOVASCULAR: denies chest pain, leg cramps with exertion, palpitations, leg swelling  GI: denies abdominal pain, nausea, vomiting, diarrhea, constipation, hematochezia, worsening heartburn or stomach ulcers  : denies dysuria, hematuria, incontinence, increased frequency, urgency, difficulty urinating  MUSCULOSKELETAL: denies musculoskeletal complaints other than in HPI  NEURO: denies numbness, tingling, weakness, balance issues, dizziness, memory loss  PSYCHIATRIC: denies Hx of depression, anxiety, other psychiatric  disorders  HEMATOLOGIC: denies blood clots, anemia, blood clotting disorders, blood transfusion  ENDOCRINE: denies autoimmune disease, thyroid issues, or diabetes  ALLERGY: denies asthma, seasonal allergies    Physical Examination:    There were no vitals taken for this visit.  Constitutional: appears well hydrated, alert and responsive, no acute distress noted    Physical Exam   Left hip exam-sutures were removed in the office today, and Steri-Strips were applied without event.  The wounds are clean and dry to exam.  Light passive range of motion is well-tolerated.  Homans' sign negative to exam.  Imaging:   XR FLUOROSCOPY C-ARM TIME LESS THAN 1 HOUR (CPT=76000)  Result Date: 6/27/2025  PROCEDURE: XR FLUOROSCOPY C-ARM TIME LESS THAN 1 HOUR (CPT=76000) WORKSTATION: [employer] REQUESTING PHYSICIAN: Dr. DEEPAK LESLIE FLUOROSCOPIC TIME: 32.4 seconds IMAGES: 37 Air Kerma: 12.67 mGy Dose-Area product: 0.398 mGy*m^2 FINDINGS: Combined with conclusion.     CONCLUSION: Fluoroscopy support was provided. No radiologist was present during image acquisition. Images demonstrate instrumentation of the left hip.  Please see separate operative report for full details. Electronically Verified and Signed by Attending Radiologist: Macario Connors MD 6/27/2025 12:05 PM Workstation: [employer]       Labs:  Lab Results   Component Value Date    WBC 7.3 04/01/2025    HGB 12.6 04/01/2025    .0 04/01/2025      Lab Results   Component Value Date    GLU 97 04/01/2025    BUN 10 04/01/2025    CREATSERUM 0.84 04/01/2025     08/18/2017    GFRNAA 108 02/25/2022    GFRAA 124 02/25/2022        Assessment and Plan:  Diagnoses and all orders for this visit:    Femoroacetabular impingement of left hip        Assessment: Status post left hip labral repair.    Plan: She will continue with physical therapy advancing her weightbearing as tolerated.  She will follow back in roughly 2 weeks prior to potential return to duty.    Follow Up: No  follow-ups on file.    Kole Hastings PA-C   [email or web address]    This note was dictated using Dragon software.  While it was briefly proofread prior to completion, some grammatical, spelling, and word choice errors due to dictation may still occur.       [1]   Past Medical History:   Allergic rhinitis    Anxiety state, unspecified    Asthma (HCC)    Esophageal reflux    Fibroids    Human papilloma virus infection    Migraines    Pap smear for cervical cancer screening    wnl   [2]   Past Surgical History:  Procedure Laterality Date    Colposcopy, cervix w/upper adjacent vagina; w/biopsy(s), cervix  01/01/2013    Other surgical history  01/01/2007    LEFT BREAST CYST REMOVED    Other surgical history  01/01/2013    Lasik    Other surgical history Left     ankle   [3]   Current Outpatient Medications   Medication Sig Dispense Refill    Acetaminophen 500 MG Oral Cap Take 1 capsule (500 mg total) by mouth every 4 (four) hours as needed for Fever. 100 capsule 0    traMADol 50 MG Oral Tab Take 1 tablet (50 mg total) by mouth every 6 (six) hours as needed for Pain. No alcohol or driving on this med. Stop if lethargic or hallucinating. 20 tablet 0    Cholecalciferol 125 MCG (5000 UT) Oral Tab Take 1 tablet (5,000 Units total) by mouth daily. 30 tablet 0    sennosides (SENOKOT) 8.6 MG Oral Tab Take 1 tablet (8.6 mg total) by mouth daily. 50 tablet 0    aspirin 325 MG Oral Tab EC Take 1 tablet (325 mg total) by mouth daily. 30 tablet 0    ondansetron (ZOFRAN) 4 mg tablet Take 1 tablet (4 mg total) by mouth every 8 (eight) hours as needed for Nausea. 20 tablet 0    oxyCODONE 5 MG Oral Tab Take 1 tablet (5 mg total) by mouth every 4 (four) hours as needed for Pain. 25 tablet 0    naproxen 500 MG Oral Tab Take 1 tablet (500 mg total) by mouth 2 (two) times daily with meals. 42 tablet 0    Naloxone HCl 4 MG/0.1ML Nasal Liquid 4 mg by Nasal route as needed. If patient remains unresponsive, repeat dose in other nostril 2-5  minutes after first dose. 1 kit 0    Meloxicam 15 MG Oral Tab Take 1 tablet (15 mg total) by mouth daily. 30 tablet 0    albuterol 108 (90 Base) MCG/ACT Inhalation Aero Soln Inhale 1 puff and hold breath for 10 seconds then exhale.  Wait 1 full minute and repeat for second puff.  Use every 4-6 hours as needed. 1 each 0    albuterol (PROAIR HFA) 108 (90 Base) MCG/ACT Inhalation Aero Soln Inhale 2 puffs into the lungs every 8 (eight) hours as needed for Wheezing. 1 each 3    montelukast 10 MG Oral Tab Take 1 tablet (10 mg total) by mouth nightly. 90 tablet 3    ondansetron 4 MG Oral Tablet Dispersible Take 1 tablet (4 mg total) by mouth every 8 (eight) hours as needed for Nausea. 30 tablet 2    levocetirizine 5 MG Oral Tab Take 1 tablet (5 mg total) by mouth every evening. 90 tablet 1    Meloxicam 15 MG Oral Tab Take 1 tablet (15 mg total) by mouth daily. 14 tablet 0    meclizine 25 MG Oral Tab Take 1 tablet (25 mg total) by mouth as needed.     [4] No Known Allergies  [5]   Family History  Problem Relation Age of Onset    Heart Disease Maternal Grandfather     Diabetes Maternal Grandfather     Hypertension Father     Obesity Father     No Known Problems Mother     Diabetes Maternal Grandmother     Heart Disorder Maternal Grandmother     Hypertension Maternal Grandmother     Cancer Maternal Grandmother 65        colon cancer    Diabetes Paternal Grandmother     Diabetes Paternal Grandfather     Stroke Neg

## 2025-07-10 NOTE — PROGRESS NOTES
Patient: Angela Chavez (35 year old, female) Referring Provider:  Insurance:   Diagnosis: Femoroacetabular impingement of left hip (M25.852) Meliton MUNIZ   Date of Surgery: 6/24/2025 Next MD visit:  N/A   Precautions:  None (ligament reconstruction R 2020- feels fully recoverd) 7/3/2025 Referral Information:    Date of Evaluation: Req: 0, Auth: 0, Exp:     06/27/25 POC Auth Visits:  15       Today's Date   7/10/2025    Subjective   Patient reports she is feeling good today.  Started doing some light weight through the leg with the walker.        Pain:  0/10     Objective     See outpatient daily record.           Assessment   Patient starting with PWB with walker today.  May advance to FWB by next Tuesday.  Worked on gait pattern in the walker for correct pattern.  Patient able to ride upright bike for 10 minutes today    Goals (to be met in 15 visits)   Patient to have full ROM at L to R hip to return to normal daily activities  Patient to have equal strength L to R hip to return to normal daily activities without pain or limitation  Patient independent with ongoing HEP  Patient to be able to progress per protocol.              Plan   Continue, advance per protocol    Treatment Last 4 Visits  Treatment Day: 4        6/27/2025 7/1/2025 7/3/2025 7/10/2025   LE Treatment   Therapeutic Exercise  Quad sets x 20, hold 5 sec, 2 x daily   Glut sets x 15 hold 5 sec  Abominal sets - x 20 hold 5 sec  Log rolls x 15  Abduction small range x 10  Bent knee fall outs x 10  Assisted meghna stretch off table  Side clam 2 x 10  Reverse clam 2 x 10  Quadriped - rocks, pelvic tilt, arm raises  Upright bike air dyne 5 1/2 minutes.  Quad sets x 20, hold 5 sec, 2 x daily   Glut sets x 20hold 5 sec  Abominal sets - x 20 hold 5 sec  Log rolls x 15  Abduction small range x 10    Assisted meghna stretch off table  Side clam 2 x 10  Reverse clam 2 x 10  Quadriped - rocks, pelvic tilt, arm raises  Prone - massage roller -  glut/HS ITB region  HS curls x 15  Bridges 2 x 10  Prone ER/IR PROM within protocol parameters.  Upright bike air dyne 6  minutes.  Seat at 2 supine - massage roller - ITB, Quad region, hip flexor region.  Quad sets x 20, hold 5 sec,    Glut sets x 20hold 5 sec  Abominal sets - x 20 hold 5 sec  Abduction small range x 10  Hip flex to 90 deg x 8  Bent knee fall outs x 15  Assisted meghna stretch off table  Bridges 2 x 10  Side clam 2 x 10  Reverse clam 2 x 10  Quadriped - rocks, pelvic tilt, arm raises    HS curls 2x 15  Prone ER/IR PROM within protocol parameters.  Upright bike air dyne 10  minutes.  Seat at 2   Therapeutic Exercise Minutes 15 40 40 40   Evaluation Minutes 30      Total Time Of Timed Procedures 15 40 40 40   Total Time Of Service-Based Procedures 30 0 0 0   Total Treatment Time 45 40 40 40   HEP Quad sets x 20, hold 5 sec, 2 x daily   Glut sets  Abominal sets - same as quads.             HEP  Worked on PWB in walker, gait pattern      Charges   Ex 3

## 2025-07-14 ENCOUNTER — OFFICE VISIT (OUTPATIENT)
Dept: PHYSICAL THERAPY | Age: 35
End: 2025-07-14
Attending: STUDENT IN AN ORGANIZED HEALTH CARE EDUCATION/TRAINING PROGRAM
Payer: COMMERCIAL

## 2025-07-14 PROCEDURE — 97110 THERAPEUTIC EXERCISES: CPT | Performed by: PHYSICAL THERAPIST

## 2025-07-14 NOTE — PROGRESS NOTES
Patient: Angela Chavez (35 year old, female) Referring Provider:  Insurance:   Diagnosis: Femoroacetabular impingement of left hip (M25.852) Meliton MUNIZ   Date of Surgery: 6/24/2025 Next MD visit:  N/A   Precautions:  None (ligament reconstruction R 2020- feels fully recoverd) 7/3/2025 Referral Information:    Date of Evaluation: Req: 0, Auth: 0, Exp:     06/27/25 POC Auth Visits:  15       Today's Date   7/14/2025    Subjective  Patient reports that she is doing well  Is walking better now, still using crutches.  When walking without walker tends to keep knees straight.       Pain: 0/10     Objective  see outpatient daily record        Assessment   Advanced to week 3 with exercises and per protocol.  Able to increase to 12 min on air dyne.  Ambulation improved with walker.  Still needs walker to do good form.    Goals (to be met in 15 visits)   Patient to have full ROM at L to R hip to return to normal daily activities  Patient to have equal strength L to R hip to return to normal daily activities without pain or limitation  Patient independent with ongoing HEP  Patient to be able to progress per protocol.                Plan   Continue per protocol    Treatment Last 4 Visits  Treatment Day: 5       7/1/2025 7/3/2025 7/10/2025 7/14/2025   LE Treatment   Therapeutic Exercise Quad sets x 20, hold 5 sec, 2 x daily   Glut sets x 15 hold 5 sec  Abominal sets - x 20 hold 5 sec  Log rolls x 15  Abduction small range x 10  Bent knee fall outs x 10  Assisted meghna stretch off table  Side clam 2 x 10  Reverse clam 2 x 10  Quadriped - rocks, pelvic tilt, arm raises  Upright bike air dyne 5 1/2 minutes.  Quad sets x 20, hold 5 sec, 2 x daily   Glut sets x 20hold 5 sec  Abominal sets - x 20 hold 5 sec  Log rolls x 15  Abduction small range x 10    Assisted meghna stretch off table  Side clam 2 x 10  Reverse clam 2 x 10  Quadriped - rocks, pelvic tilt, arm raises  Prone - massage roller - glut/HS ITB  region  HS curls x 15  Bridges 2 x 10  Prone ER/IR PROM within protocol parameters.  Upright bike air dyne 6  minutes.  Seat at 2 supine - massage roller - ITB, Quad region, hip flexor region.  Quad sets x 20, hold 5 sec,    Glut sets x 20hold 5 sec  Abominal sets - x 20 hold 5 sec  Abduction small range x 10  Hip flex to 90 deg x 8  Bent knee fall outs x 15  Assisted meghna stretch off table  Bridges 2 x 10  Side clam 2 x 10  Reverse clam 2 x 10  Quadriped - rocks, pelvic tilt, arm raises    HS curls 2x 15  Prone ER/IR PROM within protocol parameters.  Upright bike air dyne 10  minutes.  Seat at 2 supine - massage roller - ITB, Quad region, hip flexor region.  Quad sets x 20, hold 5 sec,    Glut sets x 20hold 5 sec  Abominal sets - x 20 hold 5 sec  Abduction small range x 10  Hip flex to 90 deg x 8  Assisted meghna stretch off table  Bridges 2 x 10  Side clam 3 x 10  Reverse clam 3 x 10  Quadriped - rocks, pelvic tilt, arm raises  HS curls 2x 15  Prone hip extension 3 x 10 each  Tall kneeling- shoulder flex/abd/ press, D2 patterns  x 10.  Prone ER/IR PROM within protocol parameters.  Standing weight shifts s/s, A/P  Walking backward/sideways  Upright bike air dyne 12  minutes.  Seat at 2   Therapeutic Exercise Minutes 40 40 40 45   Total Time Of Timed Procedures 40 40 40 45   Total Time Of Service-Based Procedures 0 0 0 0   Total Treatment Time 40 40 40 45        HEP  Added tall kneeling, prone hip extension, walking side/backward and weight shifts.    Charges   Ex 3

## 2025-07-17 ENCOUNTER — OFFICE VISIT (OUTPATIENT)
Dept: PHYSICAL THERAPY | Age: 35
End: 2025-07-17
Attending: STUDENT IN AN ORGANIZED HEALTH CARE EDUCATION/TRAINING PROGRAM
Payer: COMMERCIAL

## 2025-07-17 PROCEDURE — 97110 THERAPEUTIC EXERCISES: CPT | Performed by: PHYSICAL THERAPIST

## 2025-07-17 NOTE — PROGRESS NOTES
Patient: Angela Chavez (35 year old, female) Referring Provider:  Insurance:   Diagnosis: Femoroacetabular impingement of left hip (M25.852) Meliton MUNIZ   Date of Surgery: 6/24/2025 Next MD visit:  N/A   Precautions:  None (ligament reconstruction R 2020- feels fully recoverd) 7/3/2025 Referral Information:    Date of Evaluation: Req: 0, Auth: 0, Exp:     06/27/25 POC Auth Visits:  15       Today's Date   7/17/2025    Subjective  Patient reports some tightness at front of the hip but overall feeling good.  States is walking mostly now without the walker but feels not walking normally yet.  States attempted more activity the other day and did feel some light soreness and fatigue       Pain: 0/10     Objective  see outpatient daily record          Assessment   Patient starting to wean off walker now.  Worked on gait pattern as needed cues to decrease lateral lean to the L side.  Noted muscular restriction continues hip flex/quad/incision regions - will continue to work over them to loosen.  Progressing per protocol.  Patient stands on feet all day at work and walks, will need further rehab time prior to return to be able to walk with normal pattern and to perform for several hours.  Needs further strength gains LE.       Goals (to be met in 15 visits)   Patient to have full ROM at L to R hip to return to normal daily activities  Patient to have equal strength L to R hip to return to normal daily activities without pain or limitation  Patient independent with ongoing HEP  Patient to be able to progress per protocol.                  Plan  Continue per protocol, continue LE ROM, flexibility, strengthening. Work on normal gait patter.    Treatment Last 4 Visits  Treatment Day: 6       7/3/2025 7/10/2025 7/14/2025 7/17/2025   LE Treatment   Therapeutic Exercise Quad sets x 20, hold 5 sec, 2 x daily   Glut sets x 20hold 5 sec  Abominal sets - x 20 hold 5 sec  Log rolls x 15  Abduction small range x  10    Assisted meghna stretch off table  Side clam 2 x 10  Reverse clam 2 x 10  Quadriped - rocks, pelvic tilt, arm raises  Prone - massage roller - glut/HS ITB region  HS curls x 15  Bridges 2 x 10  Prone ER/IR PROM within protocol parameters.  Upright bike air dyne 6  minutes.  Seat at 2 supine - massage roller - ITB, Quad region, hip flexor region.  Quad sets x 20, hold 5 sec,    Glut sets x 20hold 5 sec  Abominal sets - x 20 hold 5 sec  Abduction small range x 10  Hip flex to 90 deg x 8  Bent knee fall outs x 15  Assisted meghna stretch off table  Bridges 2 x 10  Side clam 2 x 10  Reverse clam 2 x 10  Quadriped - rocks, pelvic tilt, arm raises    HS curls 2x 15  Prone ER/IR PROM within protocol parameters.  Upright bike air dyne 10  minutes.  Seat at 2 supine - massage roller - ITB, Quad region, hip flexor region.  Quad sets x 20, hold 5 sec,    Glut sets x 20hold 5 sec  Abominal sets - x 20 hold 5 sec  Abduction small range x 10  Hip flex to 90 deg x 8  Assisted meghna stretch off table  Bridges 2 x 10  Side clam 3 x 10  Reverse clam 3 x 10  Quadriped - rocks, pelvic tilt, arm raises  HS curls 2x 15  Prone hip extension 3 x 10 each  Tall kneeling- shoulder flex/abd/ press, D2 patterns  x 10.  Prone ER/IR PROM within protocol parameters.  Standing weight shifts s/s, A/P  Walking backward/sideways  Upright bike air dyne 12  minutes.  Seat at 2 supine - massage  ITB, Quad region, hip flexor region, incisions, IASTM   Bent knee fall outs x 15  Assisted meghna stretch off table - 2 x 30 seconds  Bridges 2 x 10  Side clam 3 x 15  Reverse clam 3 x 15  Quadriped - rocks, pelvic tilt, arm raises  HS curls 2x 15  Prone hip extension 3 x 10 each  Tall kneeling- shoulder flex/abd/ press, D2 patterns  x 10.  Prone ER/IR PROM within protocol parameters.  1/2 kneeling with arm motions   Standing weight shifts s/s, A/P  Walking backward/sideways  Upright bike air dyne 15  minutes.  Seat at 2  Work on gait  pattern without assistive device.   Therapeutic Exercise Minutes 40 40 45 45   Total Time Of Timed Procedures 40 40 45 45   Total Time Of Service-Based Procedures 0 0 0 0   Total Treatment Time 40 40 45 45        HEP  Ongoing HEP    Charges   Ex 3

## 2025-07-22 ENCOUNTER — OFFICE VISIT (OUTPATIENT)
Dept: PHYSICAL THERAPY | Age: 35
End: 2025-07-22
Attending: STUDENT IN AN ORGANIZED HEALTH CARE EDUCATION/TRAINING PROGRAM
Payer: COMMERCIAL

## 2025-07-22 PROCEDURE — 97110 THERAPEUTIC EXERCISES: CPT | Performed by: PHYSICAL THERAPIST

## 2025-07-22 PROCEDURE — 97140 MANUAL THERAPY 1/> REGIONS: CPT | Performed by: PHYSICAL THERAPIST

## 2025-07-22 NOTE — PROGRESS NOTES
Patient: Angela Chavez (35 year old, female) Referring Provider:  Insurance:   Diagnosis: Femoroacetabular impingement of left hip (M25.852) Meliton MUNIZ   Date of Surgery: 6/24/2025 Next MD visit:  N/A   Precautions:  None (ligament reconstruction R 2020- feels fully recoverd) 7/3/2025 Referral Information:    Date of Evaluation: Req: 0, Auth: 0, Exp:     06/27/25 POC Auth Visits:  15       Today's Date   7/22/2025    Subjective   Patient reporting doing well and now able to go without the walker at all. Just feeling some slight tightness front of hip.         Pain: 1/10     Objective     See outpatient daily record        Assessment   During treatment patient continues to show progress with protocol and feels little to no pain with exercises. During exercise, there was an increase in repetition. There was some slight discomfort with STM over hip flexor region/groin.  Patient is now able to walk without use of walker, some slight decrease in weight shift and stance time L .  Worked on that with weight shifts.     Goals (to be met in 15 visits)   Patient to have full ROM at L to R hip to return to normal daily activities  Patient to have equal strength L to R hip to return to normal daily activities without pain or limitation  Patient independent with ongoing HEP  Patient to be able to progress per protocol.                    Plan  Continue per protocol    Treatment Last 4 Visits  Treatment Day: 7       7/10/2025 7/14/2025 7/17/2025 7/22/2025   LE Treatment   Therapeutic Exercise supine - massage roller - ITB, Quad region, hip flexor region.  Quad sets x 20, hold 5 sec,    Glut sets x 20hold 5 sec  Abominal sets - x 20 hold 5 sec  Abduction small range x 10  Hip flex to 90 deg x 8  Bent knee fall outs x 15  Assisted meghna stretch off table  Bridges 2 x 10  Side clam 2 x 10  Reverse clam 2 x 10  Quadriped - rocks, pelvic tilt, arm raises    HS curls 2x 15  Prone ER/IR PROM within protocol  parameters.  Upright bike air dyne 10  minutes.  Seat at 2 supine - massage roller - ITB, Quad region, hip flexor region.  Quad sets x 20, hold 5 sec,    Glut sets x 20hold 5 sec  Abominal sets - x 20 hold 5 sec  Abduction small range x 10  Hip flex to 90 deg x 8  Assisted meghna stretch off table  Bridges 2 x 10  Side clam 3 x 10  Reverse clam 3 x 10  Quadriped - rocks, pelvic tilt, arm raises  HS curls 2x 15  Prone hip extension 3 x 10 each  Tall kneeling- shoulder flex/abd/ press, D2 patterns  x 10.  Prone ER/IR PROM within protocol parameters.  Standing weight shifts s/s, A/P  Walking backward/sideways  Upright bike air dyne 12  minutes.  Seat at 2 supine - massage  ITB, Quad region, hip flexor region, incisions, IASTM   Bent knee fall outs x 15  Assisted mgehna stretch off table - 2 x 30 seconds  Bridges 2 x 10  Side clam 3 x 15  Reverse clam 3 x 15  Quadriped - rocks, pelvic tilt, arm raises  HS curls 2x 15  Prone hip extension 3 x 10 each  Tall kneeling- shoulder flex/abd/ press, D2 patterns  x 10.  Prone ER/IR PROM within protocol parameters.  1/2 kneeling with arm motions   Standing weight shifts s/s, A/P  Walking backward/sideways  Upright bike air dyne 15  minutes.  Seat at 2  Work on gait pattern without assistive device. supine - massage  ITB, Quad region, hip flexor region, incisions, IASTM   Assisted meghna stretch off table - 2 x 30 seconds  Bridges 2 x 15  Side clam 3 x 15  Reverse clam 3 x 15  Quadriped - rocks, pelvic tilt, arm raises  HS curls 2x 20  Prone hip extension 3 x 10 each  Tall kneeling- shoulder flex/abd/ press, D2 patterns  x 10.  Prone ER/IR PROM within protocol parameters.  1/2 kneeling with arm motions   Standing weight shifts  A/P with foot lift off to simulate step  Walking backward/sideways  Upright bike air dyne 15  minutes.  Seat at 2       Therapeutic Exercise Minutes 40 45 45 35   Manual Therapy Minutes    12   Total Time Of Timed Procedures 40 45 45  47   Total Time Of Service-Based Procedures 0 0 0 0   Total Treatment Time 40 45 45 47        HEP  Patient independent with HEP    Charges      Ex 2, MM 1

## 2025-07-24 ENCOUNTER — OFFICE VISIT (OUTPATIENT)
Dept: PHYSICAL THERAPY | Age: 35
End: 2025-07-24
Attending: STUDENT IN AN ORGANIZED HEALTH CARE EDUCATION/TRAINING PROGRAM
Payer: COMMERCIAL

## 2025-07-24 PROCEDURE — 97140 MANUAL THERAPY 1/> REGIONS: CPT | Performed by: PHYSICAL THERAPIST

## 2025-07-24 PROCEDURE — 97110 THERAPEUTIC EXERCISES: CPT | Performed by: PHYSICAL THERAPIST

## 2025-07-24 NOTE — PROGRESS NOTES
Patient: Angela Chavez (35 year old, female) Referring Provider:  Insurance:   Diagnosis: Femoroacetabular impingement of left hip (M25.852) Dr. Kush MUNIZ   Date of Surgery: 6/24/2025 Next MD visit:  N/A   Precautions:  None (ligament reconstruction R 2020- feels fully recoverd) 7/3/2025 Referral Information:    Date of Evaluation: Req: 0, Auth: 0, Exp:     06/27/25 POC Auth Visits:  15       Today's Date   7/24/2025    Subjective   Patient reports doing well, just some slight pulling front of thigh  Walking much better.       Pain: 1/10     Objective     See outpatient daily record.        Assessment   Patient progressing well.  Able to just start 1/3 squats now at 4 weeks.  At 6 weeks can start advancing squats to work toward work activities which will involve squatting. Overall advancing with protocol well.    Goals (to be met in 15 visits)   Patient to have full ROM at L to R hip to return to normal daily activities  Patient to have equal strength L to R hip to return to normal daily activities without pain or limitation  Patient independent with ongoing HEP  Patient to be able to progress per protocol.                      Plan  Continue per protocolContinue per protocol    Treatment Last 4 Visits  Treatment Day: 8       7/14/2025 7/17/2025 7/22/2025 7/24/2025   LE Treatment   Therapeutic Exercise supine - massage roller - ITB, Quad region, hip flexor region.  Quad sets x 20, hold 5 sec,    Glut sets x 20hold 5 sec  Abominal sets - x 20 hold 5 sec  Abduction small range x 10  Hip flex to 90 deg x 8  Assisted meghna stretch off table  Bridges 2 x 10  Side clam 3 x 10  Reverse clam 3 x 10  Quadriped - rocks, pelvic tilt, arm raises  HS curls 2x 15  Prone hip extension 3 x 10 each  Tall kneeling- shoulder flex/abd/ press, D2 patterns  x 10.  Prone ER/IR PROM within protocol parameters.  Standing weight shifts s/s, A/P  Walking backward/sideways  Upright bike air dyne 12  minutes.  Seat at 2  supine - massage  ITB, Quad region, hip flexor region, incisions, IASTM   Bent knee fall outs x 15  Assisted meghna stretch off table - 2 x 30 seconds  Bridges 2 x 10  Side clam 3 x 15  Reverse clam 3 x 15  Quadriped - rocks, pelvic tilt, arm raises  HS curls 2x 15  Prone hip extension 3 x 10 each  Tall kneeling- shoulder flex/abd/ press, D2 patterns  x 10.  Prone ER/IR PROM within protocol parameters.  1/2 kneeling with arm motions   Standing weight shifts s/s, A/P  Walking backward/sideways  Upright bike air dyne 15  minutes.  Seat at 2  Work on gait pattern without assistive device. supine - massage  ITB, Quad region, hip flexor region, incisions, IASTM   Assisted meghna stretch off table - 2 x 30 seconds  Bridges 2 x 15  Side clam 3 x 15  Reverse clam 3 x 15  Quadriped - rocks, pelvic tilt, arm raises  HS curls 2x 20  Prone hip extension 3 x 10 each  Tall kneeling- shoulder flex/abd/ press, D2 patterns  x 10.  Prone ER/IR PROM within protocol parameters.  1/2 kneeling with arm motions   Standing weight shifts  A/P with foot lift off to simulate step  Walking backward/sideways  Upright bike air dyne 15  minutes.  Seat at 2     supine - massage  ITB, Quad region, hip flexor region, incisions  Assisted meghna stretch off table - 2 x 30 seconds  Bridges 2 x 15  Side clam 3 x 15  Reverse clam 3 x 15  Quadriped - rocks, pelvic tilt, arm raises  HS curls 2x 20  Prone hip extension 3 x 10 each(over pillow)  Tall kneeling- shoulder flex/abd/ press, D2 patterns  x 10 each 2#  1/2 kneeling x 10 reps flex/abd each leg 2#  Prone ER/IR PROM   1/3 squats 2 x 15  Walking backward/sideways/forward  Upright bike air dyne 15  minutes.  Seat at 2     Therapeutic Exercise Minutes 45 45 35 40   Manual Therapy Minutes   12    Total Time Of Timed Procedures 45 45 47 40   Total Time Of Service-Based Procedures 0 0 0 0   Total Treatment Time 45 45 47 40        HEP  Independent with    Charges   Ex 2, MM

## 2025-07-27 NOTE — PROGRESS NOTES
[place] - ORTHOPEDICS  [address] [facility] [address]  [phone]     NURSING INTAKE COMMENTS:   Chief Complaint   Patient presents with    Post-Op      Left hip arthroscopy - 1st visit - had sx on 6/24/25 - walking NWB with a walker- no pain -             The following individual(s) verbally consented to be recorded using ambient AI listening technology and understand that they can each withdraw their consent to this listening technology at any point by asking the clinician to turn off or pause the recording:    Patient name: Angela Chavez      HPI:   History of Present Illness  Angela Chavez is a 35 year old female who presents for a follow-up visit one week after hip surgery. She is accompanied by her brother.    She is one week post-operative from hip surgery and feels great with no pain. She has only taken Tylenol for pain management as other medications cause nausea. She has started physical therapy and can get on the bike without any issues, although she notes some discomfort from the bike seat. No pain and only a little tightness at the surgical site. No issues with the therapy exercises and no problems while on the bike.    She has a history of receiving cortisone injections in her left hip joint, with a total of three injections in the past to avoid surgery. She reports a high tolerance for pain, which has sometimes led to delaying treatment.    She feels unstable on her knee at times, especially during activities that involve pivoting. She has a long-standing knee problem for 12 years and is considering surgery. She is not currently under the care of this provider for the knee issue and is seeing another doctor.                Past Medical History[1]  Past Surgical History[2]  Current Medications[3]  Allergies[4]  Family History[5]    Social History     Occupational History    Not on file   Tobacco Use    Smoking status: Never    Smokeless tobacco: Never   Vaping Use    Vaping status: Never Used    Substance and Sexual Activity    Alcohol use: Not Currently     Comment: ONCE EVERY 2-3 MONTHS    Drug use: No    Sexual activity: Yes     Partners: Male     Birth control/protection: Implant        Review of Systems:  GENERAL: denies fevers, chills, night sweats, fatigue, unintentional weight loss/gain  SKIN: denies skin lesions, open sores, rash  HEENT:denies recent vision change, new nasal congestion,hearing loss, tinnitus, sore throat, headaches  RESPIRATORY: denies new shortness of breath, cough, asthma, wheezing  CARDIOVASCULAR: denies chest pain, leg cramps with exertion, palpitations, leg swelling  GI: denies abdominal pain, nausea, vomiting, diarrhea, constipation, hematochezia, worsening heartburn or stomach ulcers  : denies dysuria, hematuria, incontinence, increased frequency, urgency, difficulty urinating  MUSCULOSKELETAL: denies musculoskeletal complaints other than in HPI  NEURO: denies numbness, tingling, weakness, balance issues, dizziness, memory loss  PSYCHIATRIC: denies Hx of depression, anxiety, other psychiatric disorders  HEMATOLOGIC: denies blood clots, anemia, blood clotting disorders, blood transfusion  ENDOCRINE: denies autoimmune disease, thyroid issues, or diabetes  ALLERGY: denies asthma, seasonal allergies    Physical Examination:    There were no vitals taken for this visit.    Physical Exam  MUSCULOSKELETAL: Hip normal.  SKIN: Bruising present.    Constitutional: appears well hydrated, alert and responsive, no acute distress noted    LEFT Hip Exam    Incisions well healed no gross erythema or ecchymosis. Gentle ROM elicits no pain, neuro intact distally.          Imaging:     Results  PATHOLOGY  Hip joint pathology: Evidence of prior cortisone injections, chondral changes, calcifications on cartilage (06/25/2025)      Imaging was independently reviewed and interpreted by attending physician  No results found.     Labs:  Lab Results   Component Value Date    WBC 7.3 04/01/2025     HGB 12.6 04/01/2025    .0 04/01/2025      Lab Results   Component Value Date    GLU 97 04/01/2025    BUN 10 04/01/2025    CREATSERUM 0.84 04/01/2025     08/18/2017    GFRNAA 108 02/25/2022    GFRAA 124 02/25/2022        Assessment and Plan:  Assessment & Plan  Postoperative care following left hip arthroscopy  One week post-surgery with no pain and smooth recovery. Calcifications on cartilage noted from prior cortisone injections. Advised against further cortisone injections.  - Continue non-weight bearing for one more week.  - Transition to full weight bearing by end of third week post-surgery.  - Continue physical therapy with Raquel on third floor of [place].  - Allow pool use with protective covering until six weeks post-surgery.  - Schedule follow-up appointments on July 7th and July 10th to assess progress.  - Advise against further cortisone injections in left hip joint.  - Provide note for short-term disability extension if needed.      There are no diagnoses linked to this encounter.        Follow Up: No follow-ups on file.          Meliton Castaneda MD Orthopedic Surgery / Sports Medicine Specialist  [facility]  [address] [place] [address]  [email or web address]    t: [phone] f: [phone]    This note was dictated using Dragon software.  While it was briefly proofread prior to completion, some grammatical, spelling, and word choice errors due to dictation may still occur.       [1]   Past Medical History:   Allergic rhinitis    Anxiety state, unspecified    Asthma (HCC)    Esophageal reflux    Fibroids    Human papilloma virus infection    Migraines    Pap smear for cervical cancer screening    wnl   [2]   Past Surgical History:  Procedure Laterality Date    Colposcopy, cervix w/upper adjacent vagina; w/biopsy(s), cervix  01/01/2013    Other surgical history  01/01/2007    LEFT BREAST CYST REMOVED    Other surgical history  01/01/2013    Lasik    Other  surgical history Left     ankle   [3]   Current Outpatient Medications   Medication Sig Dispense Refill    Acetaminophen 500 MG Oral Cap Take 1 capsule (500 mg total) by mouth every 4 (four) hours as needed for Fever. 100 capsule 0    traMADol 50 MG Oral Tab Take 1 tablet (50 mg total) by mouth every 6 (six) hours as needed for Pain. No alcohol or driving on this med. Stop if lethargic or hallucinating. 20 tablet 0    sennosides (SENOKOT) 8.6 MG Oral Tab Take 1 tablet (8.6 mg total) by mouth daily. 50 tablet 0    aspirin 325 MG Oral Tab EC Take 1 tablet (325 mg total) by mouth daily. 30 tablet 0    ondansetron (ZOFRAN) 4 mg tablet Take 1 tablet (4 mg total) by mouth every 8 (eight) hours as needed for Nausea. 20 tablet 0    oxyCODONE 5 MG Oral Tab Take 1 tablet (5 mg total) by mouth every 4 (four) hours as needed for Pain. 25 tablet 0    naproxen 500 MG Oral Tab Take 1 tablet (500 mg total) by mouth 2 (two) times daily with meals. 42 tablet 0    Naloxone HCl 4 MG/0.1ML Nasal Liquid 4 mg by Nasal route as needed. If patient remains unresponsive, repeat dose in other nostril 2-5 minutes after first dose. 1 kit 0    Meloxicam 15 MG Oral Tab Take 1 tablet (15 mg total) by mouth daily. 30 tablet 0    albuterol 108 (90 Base) MCG/ACT Inhalation Aero Soln Inhale 1 puff and hold breath for 10 seconds then exhale.  Wait 1 full minute and repeat for second puff.  Use every 4-6 hours as needed. 1 each 0    albuterol (PROAIR HFA) 108 (90 Base) MCG/ACT Inhalation Aero Soln Inhale 2 puffs into the lungs every 8 (eight) hours as needed for Wheezing. 1 each 3    montelukast 10 MG Oral Tab Take 1 tablet (10 mg total) by mouth nightly. 90 tablet 3    ondansetron 4 MG Oral Tablet Dispersible Take 1 tablet (4 mg total) by mouth every 8 (eight) hours as needed for Nausea. 30 tablet 2    levocetirizine 5 MG Oral Tab Take 1 tablet (5 mg total) by mouth every evening. 90 tablet 1    Meloxicam 15 MG Oral Tab Take 1 tablet (15 mg total) by  mouth daily. 14 tablet 0    meclizine 25 MG Oral Tab Take 1 tablet (25 mg total) by mouth as needed.     [4] No Known Allergies  [5]   Family History  Problem Relation Age of Onset    Heart Disease Maternal Grandfather     Diabetes Maternal Grandfather     Hypertension Father     Obesity Father     No Known Problems Mother     Diabetes Maternal Grandmother     Heart Disorder Maternal Grandmother     Hypertension Maternal Grandmother     Cancer Maternal Grandmother 65        colon cancer    Diabetes Paternal Grandmother     Diabetes Paternal Grandfather     Stroke Neg

## 2025-07-29 ENCOUNTER — OFFICE VISIT (OUTPATIENT)
Dept: PHYSICAL THERAPY | Age: 35
End: 2025-07-29
Attending: STUDENT IN AN ORGANIZED HEALTH CARE EDUCATION/TRAINING PROGRAM
Payer: COMMERCIAL

## 2025-07-29 PROCEDURE — 97140 MANUAL THERAPY 1/> REGIONS: CPT | Performed by: PHYSICAL THERAPIST

## 2025-07-29 PROCEDURE — 97110 THERAPEUTIC EXERCISES: CPT | Performed by: PHYSICAL THERAPIST

## 2025-07-31 ENCOUNTER — OFFICE VISIT (OUTPATIENT)
Facility: CLINIC | Age: 35
End: 2025-07-31
Payer: COMMERCIAL

## 2025-07-31 ENCOUNTER — NURSE ONLY (OUTPATIENT)
Dept: INTERNAL MEDICINE CLINIC | Facility: HOSPITAL | Age: 35
End: 2025-07-31
Attending: PREVENTIVE MEDICINE

## 2025-07-31 ENCOUNTER — APPOINTMENT (OUTPATIENT)
Dept: PHYSICAL THERAPY | Age: 35
End: 2025-07-31
Attending: STUDENT IN AN ORGANIZED HEALTH CARE EDUCATION/TRAINING PROGRAM
Payer: COMMERCIAL

## 2025-07-31 DIAGNOSIS — M25.852 FEMOROACETABULAR IMPINGEMENT OF LEFT HIP: Primary | ICD-10-CM

## 2025-07-31 DIAGNOSIS — Z00.00 WELLNESS EXAMINATION: Primary | ICD-10-CM

## 2025-07-31 DIAGNOSIS — S73.192D TEAR OF LEFT ACETABULAR LABRUM, SUBSEQUENT ENCOUNTER: ICD-10-CM

## 2025-07-31 PROCEDURE — 99024 POSTOP FOLLOW-UP VISIT: CPT | Performed by: PHYSICIAN ASSISTANT

## 2025-07-31 PROCEDURE — 97110 THERAPEUTIC EXERCISES: CPT | Performed by: PHYSICAL THERAPIST

## 2025-07-31 PROCEDURE — 86480 TB TEST CELL IMMUN MEASURE: CPT

## 2025-07-31 PROCEDURE — 97140 MANUAL THERAPY 1/> REGIONS: CPT | Performed by: PHYSICAL THERAPIST

## 2025-08-04 ENCOUNTER — APPOINTMENT (OUTPATIENT)
Dept: PHYSICAL THERAPY | Age: 35
End: 2025-08-04
Attending: STUDENT IN AN ORGANIZED HEALTH CARE EDUCATION/TRAINING PROGRAM

## 2025-08-04 LAB
M TB IFN-G CD4+ T-CELLS BLD-ACNC: 0.04 IU/ML
M TB TUBERC IFN-G BLD QL: NEGATIVE
M TB TUBERC IGNF/MITOGEN IGNF CONTROL: >10 IU/ML
QFT TB1 AG MINUS NIL: 0.03 IU/ML
QFT TB2 AG MINUS NIL: 0.01 IU/ML

## 2025-08-07 ENCOUNTER — APPOINTMENT (OUTPATIENT)
Dept: PHYSICAL THERAPY | Age: 35
End: 2025-08-07
Attending: STUDENT IN AN ORGANIZED HEALTH CARE EDUCATION/TRAINING PROGRAM

## 2025-08-11 ENCOUNTER — OFFICE VISIT (OUTPATIENT)
Dept: PHYSICAL THERAPY | Age: 35
End: 2025-08-11
Attending: STUDENT IN AN ORGANIZED HEALTH CARE EDUCATION/TRAINING PROGRAM

## 2025-08-11 PROCEDURE — 97140 MANUAL THERAPY 1/> REGIONS: CPT | Performed by: PHYSICAL THERAPIST

## 2025-08-11 PROCEDURE — 97110 THERAPEUTIC EXERCISES: CPT | Performed by: PHYSICAL THERAPIST

## 2025-08-14 ENCOUNTER — OFFICE VISIT (OUTPATIENT)
Dept: PHYSICAL THERAPY | Age: 35
End: 2025-08-14
Attending: STUDENT IN AN ORGANIZED HEALTH CARE EDUCATION/TRAINING PROGRAM

## 2025-08-14 PROCEDURE — 97140 MANUAL THERAPY 1/> REGIONS: CPT | Performed by: PHYSICAL THERAPIST

## 2025-08-14 PROCEDURE — 97110 THERAPEUTIC EXERCISES: CPT | Performed by: PHYSICAL THERAPIST

## 2025-08-18 ENCOUNTER — OFFICE VISIT (OUTPATIENT)
Dept: PHYSICAL THERAPY | Age: 35
End: 2025-08-18
Attending: STUDENT IN AN ORGANIZED HEALTH CARE EDUCATION/TRAINING PROGRAM

## 2025-08-18 PROCEDURE — 97110 THERAPEUTIC EXERCISES: CPT | Performed by: PHYSICAL THERAPIST

## 2025-08-18 PROCEDURE — 97140 MANUAL THERAPY 1/> REGIONS: CPT | Performed by: PHYSICAL THERAPIST

## 2025-08-21 ENCOUNTER — OFFICE VISIT (OUTPATIENT)
Dept: PHYSICAL THERAPY | Age: 35
End: 2025-08-21
Attending: STUDENT IN AN ORGANIZED HEALTH CARE EDUCATION/TRAINING PROGRAM

## 2025-08-21 PROCEDURE — 97110 THERAPEUTIC EXERCISES: CPT | Performed by: PHYSICAL THERAPIST

## 2025-08-25 ENCOUNTER — OFFICE VISIT (OUTPATIENT)
Dept: PHYSICAL THERAPY | Age: 35
End: 2025-08-25
Attending: STUDENT IN AN ORGANIZED HEALTH CARE EDUCATION/TRAINING PROGRAM

## 2025-08-25 PROCEDURE — 97110 THERAPEUTIC EXERCISES: CPT | Performed by: PHYSICAL THERAPIST

## 2025-08-28 ENCOUNTER — APPOINTMENT (OUTPATIENT)
Dept: PHYSICAL THERAPY | Age: 35
End: 2025-08-28
Attending: STUDENT IN AN ORGANIZED HEALTH CARE EDUCATION/TRAINING PROGRAM

## 2025-08-29 ENCOUNTER — OFFICE VISIT (OUTPATIENT)
Dept: PHYSICAL THERAPY | Age: 35
End: 2025-08-29
Attending: STUDENT IN AN ORGANIZED HEALTH CARE EDUCATION/TRAINING PROGRAM

## 2025-08-29 PROCEDURE — 97110 THERAPEUTIC EXERCISES: CPT | Performed by: PHYSICAL THERAPIST

## (undated) DEVICE — PORTAL ENTRY KIT

## (undated) DEVICE — INJECTOR II NEEDLE CARTRIDGE: Brand: INJECTOR

## (undated) DEVICE — GLOVE SUR 7.5 SENSICARE PI PIP CRM PWD F ORTH

## (undated) DEVICE — NANOPASS REACH CRESCENT: Brand: NANOPASS

## (undated) DEVICE — XL, ARTHROSCOPIC SHAVER BLADES, DIAMOND ROUND BUR.  DO NOT RESTERILIZE, DO NOT USE IF PACKAGE IS DAMAGED, KEEP DRY, KEEP AWAY FROM SUNLIGHT: Brand: CROSSBLADE

## (undated) DEVICE — FLOWPORT II CANNULA WITH OBTURATOR STRYKER 165MM: Brand: FLOWPORT

## (undated) DEVICE — NEEDLE HYPO 18GA L1.5IN PNK SS PVT SHLD BVL

## (undated) DEVICE — GLOVE SUR 7.5 SENSICARE PI PIP CRM PWD F

## (undated) DEVICE — SUT ZIPLINE 2 36IN NABSRB BLK WHT COBRAID

## (undated) DEVICE — SAMURAI BLADE FULL RADIUS: Brand: SAMURAI

## (undated) DEVICE — SLINGSHOT 70 UP: Brand: SLINGSHOT

## (undated) DEVICE — CONTAINER,SPECIMEN,OR STERILE,4OZ: Brand: MEDLINE

## (undated) DEVICE — SHEET,DRAPE,53X77,STERILE: Brand: MEDLINE

## (undated) DEVICE — SOLUTION IRRIG 3000ML 0.9% NACL FLX CONT

## (undated) DEVICE — 1010 S-DRAPE TOWEL DRAPE 10/BX: Brand: STERI-DRAPE™

## (undated) DEVICE — 60 ML SYRINGE LUER-LOCK TIP: Brand: MONOJECT

## (undated) DEVICE — SUT ETHLN 3-0 18IN PS-2 NABSRB BLK 19MM 3/8 C

## (undated) DEVICE — XL TOMCAT, HIP CUTTER. ARTHROSCOPIC SHAVER BLADE. FOR USE WITH: REF 0375-701-500, 0375-704-500, 0375-708-500. DO NOT USE IF PACKAGE IS DAMAGED, KEEP DRY, KEEP AWAY FROM SUNLIGHT: Brand: FORMULA

## (undated) DEVICE — 3M™ STERI-STRIP™ REINFORCED ADHESIVE SKIN CLOSURES, R1546, 1/4 IN X 4 IN (6 MM X 100 MM), 10 STRIPS/ENVELOPE: Brand: 3M™ STERI-STRIP™

## (undated) DEVICE — SUT ORTHOCORD 2-0 36IN MO-7 ABSRB VLT L22MM 1

## (undated) DEVICE — 50-S SWEEP + XL, SUCTION PROBE, NON-BENDABLE, XL/HIP LENGTH: Brand: SERFAS ENERGY

## (undated) DEVICE — APPLICATOR PREP 26ML CHG 2% ISO ALC 70%

## (undated) DEVICE — SUT MCRYL 3-0 27IN ABSRB UD L24MM PS-1

## (undated) DEVICE — CINCHLOCK SS DRILL BIT: Brand: CINCHLOCK

## (undated) DEVICE — SYRINGE MED 20ML STD CLR PLAS LL TIP N CTRL

## (undated) DEVICE — Device

## (undated) DEVICE — TUBING PMP L16FT DISP INFLOW

## (undated) DEVICE — PACK CDS HIP PINNING

## (undated) DEVICE — MEDI-VAC NON-CONDUCTIVE SUCTION TUBING: Brand: CARDINAL HEALTH

## (undated) DEVICE — BANDAGE,GAUZE,4.5"X4.1YD,STERILE,LF: Brand: MEDLINE

## (undated) DEVICE — COVER,MAYO STAND,STERILE: Brand: MEDLINE

## (undated) DEVICE — TRANSPORT CANNULA 8MM LENGTH 7, 8, 9: Brand: TRANSPORT

## (undated) NOTE — LETTER
AUTHORIZATION FOR SURGICAL OPERATION OR OTHER PROCEDURE    1. I hereby authorize Dr. Dustin Shah and the [facility] staff assigned to my case to perform the following operation and/or procedure at the [facility]:    Left hip steroid injection under US guidance    2.  My physician has explained the nature and purpose of the operation or other procedure, possible alternative methods of treatment, the risks involved, and the possibility of complication to me.  I acknowledge that no guarantee has been made as to the result that may be obtained.  3.  I recognize that, during the course of this operation, or other procedure, unforseen conditions may necessitate additional or different procedure than those listed above.  I, therefore, further authorize and request that the above named physician, his/her physician assistants or designees perform such procedures as are, in his/her professional opinion, necessary and desirable.  4.  Any tissue or organs removed in the operation or other procedure may be disposed of by and at the discretion of the [facility] staff and [facility].  5.  I understand that in the event of a medical emergency, I will be transported by local paramedics to [facility] or other hospital emergency department.  6.  I certify that I have read and fully understand the above consent to operation and/or other procedure.    7.  I acknowledge that my physician has explained sedation/analgesia administration to me including the risks and benefits.  I consent to the administration of sedation/analgesia as may be necessary or desirable in the judgement of my physician.    Witness signature: ___________________________________________________ Date:  ______/______/_____                    Time:  ________ A.M.  P.M.       Patient Name:  Angela Chavez  6/21/1990  [id number]         Patient signature:  ___________________________________________________        Statement of  Physician  My signature below affirms that prior to the time of the procedure, I have explained to the patient and/or his/her guardian, the risks and benefits involved in the proposed treatment and any reasonable alternative to the proposed treatment.  I have also explained the risks and benefits involved in the refusal of the proposed treatment and have answered the patient's questions.                        Date:  ______/______/_______  Provider                      Signature:  __________________________________________________________       Time:  ___________ A.M    P.M.

## (undated) NOTE — LETTER
Date: 4/15/2020    Patient Name: Ena Robledo          To Whom it may concern: This letter has been written at the patient's request. The above is a patient at the [facility]. She has a history of asthma.         Sincerely,    Pete Mcmahan

## (undated) NOTE — LETTER
[facility]     [] NEW APPLICANT  [address] [place] [address]  [] RENEWAL            Persons with Disabilities Certification for Parking Placard  *This form is valid for three months from your physician's signature date for a Temporary Placard and six months for a Permanent Placard.    NOTE TO ALL DISABILITY LICENSE PLATE OWNERS:  If you have a disability license plate, you MUST complete the form and renew your placard.    DIRECTIONS: Both sides of this document must be signed and completed fully. All fields are required.   Applicants complete Part 1. If the applicant is a MINOR, then Parent/Guardian(s) MUST also complete Part 2. The applicant's medical professional MUSTcomplete Part 3. If the applicant is applying for meter-exempt parking, his/her medical professional MUST also complete Part 4.    PART 1:   Applicant Information (MUST have a valid [place] 's license and/or ID card)  I hereby certify  that I meet the definition of a person with a disability as provided in [address] [place] [address] 5/1-159.1, and I certify  that my physical condition entitles me to the issuance of a Persons with Disabilities Parking Placard. By affixing my signature below, I understand that the parking placard may not be used unless I am the  or passenger of the vehicle.     *If a  , please provide a copy of your  showing proof of service.   Disability Parking Placard # (if any)     Full Name of Person with Disability (If minor, complete Part 2 also)    Angela Chavez  Male/Female  female  Date of Birth   6/21/1990    Valid [place] ’s License or ID Card # of Applicant                                                                                                  [place] [id number] [facility] [id number]    Mailing Address if Different from Above   Telephone Number  [phone] (home)  Email Address     ? Yes/No     Signature of Person with  Disability Today’s Date  5/27/2025     PART 2:   For Parent or Legal Guardian (MUST have a valid 's license and/or ID card)  I hereby certify that the above applicant is a minor and I have primary responsibility for his/her transportation. By affixing my signature below, I understand that the disability placard is issued to the person with the disability and may not be used unless I am transporting the disabled person in the vehicle.     Name of Parent or Legal Guardian   Relationship to Person with Disability   Valid [place] ’s License or ID Card #          [place] Address Apt/Unit# City State Zip   Telephone Number Email Address   Signature of Parent or Legal Guardian Today’s Date  5/27/2025     Warning: Any misuse of the disability parking placard/plates or making a false application may result in the revocation of the placard, a 12-month suspension or revocation of your drivers license, and a fine of up to $1,000.    Temporary Disabled Parking Placard Applications -- May be taken to any [facility] facility or mailed in.  Permanent Disables Parking Placard Applications -- MUST be mailed to the following address:  , Persons with Disabilities Placard Unit, [address] [place] [address], [address], [place] [address].    *If you have a permanent disability placard and would like a Persons with Disabilities License Plate, please visit your local  facility to apply. You will need your permanent placard number and current plate number or [place].     Please complete Page 2 to ensure timely processing.    Printed by authority of the [facility]. July 2021 -- 1 -- VSD 62.28          Part 3: Medical Eligibility Standards and Medical Professionals Certification  As the medical professional(s) executing this document and verifying the nature of the applicant's disability, I understand that making a false representation of a person's disability for the purposes of obtaining  any type of a disabled parking placard may result in suspension or revocation of my license and a fine of up to $1,000. As a licensed physician, advanced practice nurse, optometrist, chiropractor or physician's assistant, I certify the applicant has a condition that constitutes him/her as a person with disabilities.   Length of Disability: (Check one)   [x]   Temporary Disability; the duration of this disability is ____3 months________ (maximum 6 months)  []   Permanent Disability  []   Meter-Exempt Disability (Must complete and sign Part 4 also.)  Check all that apply (MUST check at least one):  []  Is restricted by a lung disease to such a degree that the person’s forced (respiratory) expiratory volume (FEV) for 1 second, when measured by spirometry, is less than 1 liter.  []   Uses a portable oxygen device.  []   Has a Class III or Class IV cardiac condition according to the standards set by the American Heart Association.  []   Cannot walk without the use of or assistance from a wheelchair, a walker, a crutch, a brace, a prosthetic device or another person.  [x]   Is severely limited in the ability to walk due to an arthritic, neurological, oncological or orthopedic condition.  []   Cannot walk 200 feet without stopping to rest because of one of the above five conditions.  Check all that apply: (MUST check at least one diagnosis):  []Amputation of extremity(s)_________________ [] Arthritis of the ___________________  []Spina Bifida     []Osteoarthritis of the __________________   []Multiple Sclerosis    [] Chronic Pain due to ___________________  []Quadriplegia/Paraplegia   [] Legally Blind with limited mobility  [] Cerebral Palsy  [x] Other Diagnosis: __________Femoroacetabular impingement of left hip _____________    If none of the above conditions apply, list the medical condition that impacts the person’s mobility.     Medical Professional’s Printed Name*   Meliton Castaneda MD Specialty  Orthopedics    Office Address   [address]  [place] [address]     Medical Professional’s Signature   State Professional License Number (NOT NPI#)  [id number] Today’s Date                  5/27/2025    Signature of Collaborating/Supervising Physician (if signed above by resident/assistant) [facility] State Professional License Number             PART 4: Medical Eligibility for Meter-Exempt Parking  The meter-exempt parking certification must be completed only when the applicant qualifies. To qualify, the applicant MUST have a VALID  [place] 's license, have an ambulatory disability described in Part 3, and also have one of the following conditions listed below.  Economic need is not a consideration for meter-exempt parking    The applicant is eligible for meter-exempt parking as provided by statue due to the following PERMANENT medical condition or disability:    Check all that apply:  [] Cannot manage, manipulate, or insert coins, or obtain tickets in parking meters/ticket machines due to lack of fine motor control of BOTH hands.  [] Cannot reach above his/her head to a height of 42 inches from the ground due to a lack of finger, hand or upper-extremity strength or mobility.  [] Cannot approach a parking meter due to his/her use of a wheelchair or other device for mobility.  [] Cannot walk more than 20 feet due to an orthopedic, neurological, cardiovascular, or lung condition in which the degree of debilitation is so severe that it almost completely impedes the ability to walk.  [] Missing a hand(s) or arm(s) or has permanently lost the use of a hand or arm.  [] Patient is under 18 years of age and incapable of driving.     Medical Professional’s Signature State Professional License Number (NOT NPI#)   Today’s Date                  5/27/2025    Signature of Collaborating/Supervising Physician (if signed above by resident/assistant) Supervising State Professional License Number     FOR   OFFICE USE ONLY     Parking Placard Number:  Expiration Date:   Issued By: Issued Date:

## (undated) NOTE — ED AVS SNAPSHOT
[facility] [address] [place] [address]    Phone:  [phone]    Fax:  [phone]           Hi Somers   MRN: [id number]    Department:  THE [facility]   Date of Visit: may not be covered by your plan. Please contact your insurance company to determine coverage for follow-up care and referrals. [facility]  [address] [facility], [address] [place] [address]  [phone] [address] prescription right away and begin taking the medication(s) as directed. If the Immediate Care Provider has read X-rays, these will be re-interpreted by a radiologist.  If there is a significant change in your reading, you will be contacted.  Please make Medicaid plans. To get signed up and covered, please call [phone] and ask to get set up for an insurance coverage that is in-network with Luis Zhong.         MyChart     Visit [employer]  You can access your MyChart to more actively manage

## (undated) NOTE — Clinical Note
Dear Dr. Bianca Jansen,  Thank you for referring Enrique Temple to the [facility] in [place].   Sincerely,  Alex Cortez NP

## (undated) NOTE — LETTER
Date: 2025      Patient Name: Angela Chavez      : 1990        Thank you for choosing [facility] as your health care provider. Your physician has deemed the following medical service(s) necessary. However, your insurance plan may not pay for all of your health care and costs and may deny payment for this service. The fact that your insurance plan does not pay for an item or service does not mean you should not receive it. The purpose of this form is to help you make an informed decision about whether or not you want to receive this service(s) that may not be paid for by your insurance plan.    CPT Code Description     Cost     Left hip steroid injection under US guidance    I understand that the above mentioned service(s) or supply may not be covered by my insurance company. I agree to be financially responsible for the cost of this service or supply in the event of my insurance denies payment as a non-covered benefit.        ______________________________________________________________________  Signature of Patient or Patient's Representative  Relationship  Date    ______________________________________________________________________  Signature of Witness to signing of form   Printed Name

## (undated) NOTE — LETTER
[address] [place] [address] [address], SJ[facility]:[id number]/[id number]    CONSENT FOR PROCEDURE/SEDATION    1. I authorize the performance upon [address] [place] [address] [address] 15  the following: IUD Removal    2.  I authorize Dr. Sherry Rahman MD (and whomever is designated as the doctor’s assistant), to perfor Witness: _________________________________________ Date:___________     Physician Signature: _______________________________ Date:___________